# Patient Record
Sex: FEMALE | Race: WHITE | NOT HISPANIC OR LATINO | Employment: OTHER | URBAN - METROPOLITAN AREA
[De-identification: names, ages, dates, MRNs, and addresses within clinical notes are randomized per-mention and may not be internally consistent; named-entity substitution may affect disease eponyms.]

---

## 2017-05-12 DIAGNOSIS — E78.5 HYPERLIPIDEMIA: ICD-10-CM

## 2017-05-12 DIAGNOSIS — I10 ESSENTIAL (PRIMARY) HYPERTENSION: ICD-10-CM

## 2017-05-12 DIAGNOSIS — E11.9 TYPE 2 DIABETES MELLITUS WITHOUT COMPLICATIONS (HCC): ICD-10-CM

## 2017-05-12 DIAGNOSIS — K76.0 FATTY (CHANGE OF) LIVER, NOT ELSEWHERE CLASSIFIED: ICD-10-CM

## 2017-06-05 ENCOUNTER — APPOINTMENT (OUTPATIENT)
Dept: LAB | Facility: CLINIC | Age: 72
End: 2017-06-05
Payer: MEDICARE

## 2017-06-05 DIAGNOSIS — E11.9 TYPE 2 DIABETES MELLITUS WITHOUT COMPLICATIONS (HCC): ICD-10-CM

## 2017-06-05 DIAGNOSIS — E78.5 HYPERLIPIDEMIA: ICD-10-CM

## 2017-06-05 DIAGNOSIS — I10 ESSENTIAL (PRIMARY) HYPERTENSION: ICD-10-CM

## 2017-06-05 DIAGNOSIS — K76.0 FATTY (CHANGE OF) LIVER, NOT ELSEWHERE CLASSIFIED: ICD-10-CM

## 2017-06-05 LAB
ALBUMIN SERPL BCP-MCNC: 4.2 G/DL (ref 3.5–5)
ALP SERPL-CCNC: 64 U/L (ref 46–116)
ALT SERPL W P-5'-P-CCNC: 19 U/L (ref 12–78)
ANION GAP SERPL CALCULATED.3IONS-SCNC: 7 MMOL/L (ref 4–13)
AST SERPL W P-5'-P-CCNC: 17 U/L (ref 5–45)
BASOPHILS # BLD AUTO: 0.03 THOUSANDS/ΜL (ref 0–0.1)
BASOPHILS NFR BLD AUTO: 0 % (ref 0–1)
BILIRUB SERPL-MCNC: 0.5 MG/DL (ref 0.2–1)
BUN SERPL-MCNC: 11 MG/DL (ref 5–25)
CALCIUM SERPL-MCNC: 9.5 MG/DL (ref 8.3–10.1)
CHLORIDE SERPL-SCNC: 105 MMOL/L (ref 100–108)
CHOLEST SERPL-MCNC: 260 MG/DL (ref 50–200)
CO2 SERPL-SCNC: 29 MMOL/L (ref 21–32)
CREAT SERPL-MCNC: 0.59 MG/DL (ref 0.6–1.3)
EOSINOPHIL # BLD AUTO: 0.39 THOUSAND/ΜL (ref 0–0.61)
EOSINOPHIL NFR BLD AUTO: 6 % (ref 0–6)
ERYTHROCYTE [DISTWIDTH] IN BLOOD BY AUTOMATED COUNT: 13.1 % (ref 11.6–15.1)
EST. AVERAGE GLUCOSE BLD GHB EST-MCNC: 128 MG/DL
GFR SERPL CREATININE-BSD FRML MDRD: >60 ML/MIN/1.73SQ M
GLUCOSE P FAST SERPL-MCNC: 107 MG/DL (ref 65–99)
HBA1C MFR BLD: 6.1 % (ref 4.2–6.3)
HCT VFR BLD AUTO: 42.2 % (ref 34.8–46.1)
HDLC SERPL-MCNC: 75 MG/DL (ref 40–60)
HGB BLD-MCNC: 14.4 G/DL (ref 11.5–15.4)
LDLC SERPL CALC-MCNC: 168 MG/DL (ref 0–100)
LYMPHOCYTES # BLD AUTO: 2.63 THOUSANDS/ΜL (ref 0.6–4.47)
LYMPHOCYTES NFR BLD AUTO: 38 % (ref 14–44)
MCH RBC QN AUTO: 30.5 PG (ref 26.8–34.3)
MCHC RBC AUTO-ENTMCNC: 34.1 G/DL (ref 31.4–37.4)
MCV RBC AUTO: 89 FL (ref 82–98)
MONOCYTES # BLD AUTO: 0.56 THOUSAND/ΜL (ref 0.17–1.22)
MONOCYTES NFR BLD AUTO: 8 % (ref 4–12)
NEUTROPHILS # BLD AUTO: 3.25 THOUSANDS/ΜL (ref 1.85–7.62)
NEUTS SEG NFR BLD AUTO: 48 % (ref 43–75)
NRBC BLD AUTO-RTO: 0 /100 WBCS
PLATELET # BLD AUTO: 208 THOUSANDS/UL (ref 149–390)
PMV BLD AUTO: 10.7 FL (ref 8.9–12.7)
POTASSIUM SERPL-SCNC: 4.5 MMOL/L (ref 3.5–5.3)
PROT SERPL-MCNC: 7.8 G/DL (ref 6.4–8.2)
RBC # BLD AUTO: 4.72 MILLION/UL (ref 3.81–5.12)
SODIUM SERPL-SCNC: 141 MMOL/L (ref 136–145)
TRIGL SERPL-MCNC: 87 MG/DL
TSH SERPL DL<=0.05 MIU/L-ACNC: 4.38 UIU/ML (ref 0.36–3.74)
WBC # BLD AUTO: 6.87 THOUSAND/UL (ref 4.31–10.16)

## 2017-06-05 PROCEDURE — 84443 ASSAY THYROID STIM HORMONE: CPT

## 2017-06-05 PROCEDURE — 80061 LIPID PANEL: CPT

## 2017-06-05 PROCEDURE — 85025 COMPLETE CBC W/AUTO DIFF WBC: CPT

## 2017-06-05 PROCEDURE — 36415 COLL VENOUS BLD VENIPUNCTURE: CPT

## 2017-06-05 PROCEDURE — 80053 COMPREHEN METABOLIC PANEL: CPT

## 2017-06-05 PROCEDURE — 83036 HEMOGLOBIN GLYCOSYLATED A1C: CPT

## 2017-06-13 ENCOUNTER — ALLSCRIPTS OFFICE VISIT (OUTPATIENT)
Dept: OTHER | Facility: OTHER | Age: 72
End: 2017-06-13

## 2017-06-13 DIAGNOSIS — I10 ESSENTIAL (PRIMARY) HYPERTENSION: ICD-10-CM

## 2017-06-13 DIAGNOSIS — R79.89 OTHER SPECIFIED ABNORMAL FINDINGS OF BLOOD CHEMISTRY: ICD-10-CM

## 2017-06-13 DIAGNOSIS — E78.5 HYPERLIPIDEMIA: ICD-10-CM

## 2017-06-13 DIAGNOSIS — E11.9 TYPE 2 DIABETES MELLITUS WITHOUT COMPLICATIONS (HCC): ICD-10-CM

## 2017-06-13 DIAGNOSIS — R73.09 OTHER ABNORMAL GLUCOSE: ICD-10-CM

## 2017-08-30 DIAGNOSIS — E11.9 TYPE 2 DIABETES MELLITUS WITHOUT COMPLICATIONS (HCC): ICD-10-CM

## 2017-08-30 DIAGNOSIS — E55.9 VITAMIN D DEFICIENCY: ICD-10-CM

## 2017-08-30 DIAGNOSIS — K52.9 NONINFECTIVE GASTROENTERITIS AND COLITIS: ICD-10-CM

## 2017-08-30 DIAGNOSIS — I10 ESSENTIAL (PRIMARY) HYPERTENSION: ICD-10-CM

## 2017-08-30 DIAGNOSIS — R94.6 ABNORMAL RESULTS OF THYROID FUNCTION STUDIES: ICD-10-CM

## 2017-08-30 DIAGNOSIS — M81.0 AGE-RELATED OSTEOPOROSIS WITHOUT CURRENT PATHOLOGICAL FRACTURE: ICD-10-CM

## 2017-08-30 DIAGNOSIS — K76.0 FATTY (CHANGE OF) LIVER, NOT ELSEWHERE CLASSIFIED: ICD-10-CM

## 2017-08-30 DIAGNOSIS — R73.09 OTHER ABNORMAL GLUCOSE: ICD-10-CM

## 2017-09-06 ENCOUNTER — APPOINTMENT (OUTPATIENT)
Dept: LAB | Facility: CLINIC | Age: 72
End: 2017-09-06
Payer: MEDICARE

## 2017-09-06 DIAGNOSIS — I10 ESSENTIAL (PRIMARY) HYPERTENSION: ICD-10-CM

## 2017-09-06 DIAGNOSIS — E11.9 TYPE 2 DIABETES MELLITUS WITHOUT COMPLICATIONS (HCC): ICD-10-CM

## 2017-09-06 DIAGNOSIS — R73.09 OTHER ABNORMAL GLUCOSE: ICD-10-CM

## 2017-09-06 DIAGNOSIS — K76.0 FATTY (CHANGE OF) LIVER, NOT ELSEWHERE CLASSIFIED: ICD-10-CM

## 2017-09-06 DIAGNOSIS — R94.6 ABNORMAL RESULTS OF THYROID FUNCTION STUDIES: ICD-10-CM

## 2017-09-06 DIAGNOSIS — M81.0 AGE-RELATED OSTEOPOROSIS WITHOUT CURRENT PATHOLOGICAL FRACTURE: ICD-10-CM

## 2017-09-06 LAB
25(OH)D3 SERPL-MCNC: 8 NG/ML (ref 30–100)
ALBUMIN SERPL BCP-MCNC: 4 G/DL (ref 3.5–5)
ALP SERPL-CCNC: 68 U/L (ref 46–116)
ALT SERPL W P-5'-P-CCNC: 17 U/L (ref 12–78)
ANION GAP SERPL CALCULATED.3IONS-SCNC: 7 MMOL/L (ref 4–13)
AST SERPL W P-5'-P-CCNC: 19 U/L (ref 5–45)
BASOPHILS # BLD AUTO: 0.04 THOUSANDS/ΜL (ref 0–0.1)
BASOPHILS NFR BLD AUTO: 1 % (ref 0–1)
BILIRUB SERPL-MCNC: 0.63 MG/DL (ref 0.2–1)
BUN SERPL-MCNC: 12 MG/DL (ref 5–25)
CALCIUM SERPL-MCNC: 9.7 MG/DL (ref 8.3–10.1)
CHLORIDE SERPL-SCNC: 101 MMOL/L (ref 100–108)
CHOLEST SERPL-MCNC: 242 MG/DL (ref 50–200)
CO2 SERPL-SCNC: 30 MMOL/L (ref 21–32)
CREAT SERPL-MCNC: 0.67 MG/DL (ref 0.6–1.3)
CREAT UR-MCNC: 17.4 MG/DL
EOSINOPHIL # BLD AUTO: 0.3 THOUSAND/ΜL (ref 0–0.61)
EOSINOPHIL NFR BLD AUTO: 5 % (ref 0–6)
ERYTHROCYTE [DISTWIDTH] IN BLOOD BY AUTOMATED COUNT: 13.2 % (ref 11.6–15.1)
EST. AVERAGE GLUCOSE BLD GHB EST-MCNC: 128 MG/DL
GFR SERPL CREATININE-BSD FRML MDRD: 89 ML/MIN/1.73SQ M
GLUCOSE P FAST SERPL-MCNC: 116 MG/DL (ref 65–99)
HBA1C MFR BLD: 6.1 % (ref 4.2–6.3)
HCT VFR BLD AUTO: 41.3 % (ref 34.8–46.1)
HDLC SERPL-MCNC: 68 MG/DL (ref 40–60)
HGB BLD-MCNC: 14.1 G/DL (ref 11.5–15.4)
LDLC SERPL CALC-MCNC: 155 MG/DL (ref 0–100)
LYMPHOCYTES # BLD AUTO: 2.75 THOUSANDS/ΜL (ref 0.6–4.47)
LYMPHOCYTES NFR BLD AUTO: 42 % (ref 14–44)
MCH RBC QN AUTO: 30.2 PG (ref 26.8–34.3)
MCHC RBC AUTO-ENTMCNC: 34.1 G/DL (ref 31.4–37.4)
MCV RBC AUTO: 88 FL (ref 82–98)
MICROALBUMIN UR-MCNC: <5 MG/L (ref 0–20)
MICROALBUMIN/CREAT 24H UR: <29 MG/G CREATININE (ref 0–30)
MONOCYTES # BLD AUTO: 0.58 THOUSAND/ΜL (ref 0.17–1.22)
MONOCYTES NFR BLD AUTO: 9 % (ref 4–12)
NEUTROPHILS # BLD AUTO: 2.89 THOUSANDS/ΜL (ref 1.85–7.62)
NEUTS SEG NFR BLD AUTO: 43 % (ref 43–75)
NRBC BLD AUTO-RTO: 0 /100 WBCS
PLATELET # BLD AUTO: 222 THOUSANDS/UL (ref 149–390)
PMV BLD AUTO: 10.7 FL (ref 8.9–12.7)
POTASSIUM SERPL-SCNC: 4.5 MMOL/L (ref 3.5–5.3)
PROT SERPL-MCNC: 7.9 G/DL (ref 6.4–8.2)
RBC # BLD AUTO: 4.67 MILLION/UL (ref 3.81–5.12)
SODIUM SERPL-SCNC: 138 MMOL/L (ref 136–145)
TRIGL SERPL-MCNC: 95 MG/DL
TSH SERPL DL<=0.05 MIU/L-ACNC: 5.32 UIU/ML (ref 0.36–3.74)
WBC # BLD AUTO: 6.57 THOUSAND/UL (ref 4.31–10.16)

## 2017-09-06 PROCEDURE — 82306 VITAMIN D 25 HYDROXY: CPT

## 2017-09-06 PROCEDURE — 82570 ASSAY OF URINE CREATININE: CPT

## 2017-09-06 PROCEDURE — 85025 COMPLETE CBC W/AUTO DIFF WBC: CPT

## 2017-09-06 PROCEDURE — 80061 LIPID PANEL: CPT

## 2017-09-06 PROCEDURE — 80053 COMPREHEN METABOLIC PANEL: CPT

## 2017-09-06 PROCEDURE — 82043 UR ALBUMIN QUANTITATIVE: CPT

## 2017-09-06 PROCEDURE — 83036 HEMOGLOBIN GLYCOSYLATED A1C: CPT

## 2017-09-06 PROCEDURE — 84443 ASSAY THYROID STIM HORMONE: CPT

## 2017-09-06 PROCEDURE — 36415 COLL VENOUS BLD VENIPUNCTURE: CPT

## 2017-09-11 ENCOUNTER — ALLSCRIPTS OFFICE VISIT (OUTPATIENT)
Dept: OTHER | Facility: OTHER | Age: 72
End: 2017-09-11

## 2017-09-21 ENCOUNTER — APPOINTMENT (OUTPATIENT)
Dept: LAB | Facility: CLINIC | Age: 72
End: 2017-09-21
Payer: MEDICARE

## 2017-09-21 ENCOUNTER — GENERIC CONVERSION - ENCOUNTER (OUTPATIENT)
Dept: OTHER | Facility: OTHER | Age: 72
End: 2017-09-21

## 2017-09-21 DIAGNOSIS — E55.9 VITAMIN D DEFICIENCY: ICD-10-CM

## 2017-09-21 DIAGNOSIS — K52.9 NONINFECTIVE GASTROENTERITIS AND COLITIS: ICD-10-CM

## 2017-09-21 LAB
25(OH)D3 SERPL-MCNC: 15.9 NG/ML (ref 30–100)
MAGNESIUM SERPL-MCNC: 2.3 MG/DL (ref 1.6–2.6)

## 2017-09-21 PROCEDURE — 36415 COLL VENOUS BLD VENIPUNCTURE: CPT

## 2017-09-21 PROCEDURE — 82306 VITAMIN D 25 HYDROXY: CPT

## 2017-09-21 PROCEDURE — 83735 ASSAY OF MAGNESIUM: CPT

## 2017-09-27 DIAGNOSIS — R79.89 OTHER SPECIFIED ABNORMAL FINDINGS OF BLOOD CHEMISTRY: ICD-10-CM

## 2017-11-27 ENCOUNTER — GENERIC CONVERSION - ENCOUNTER (OUTPATIENT)
Dept: OTHER | Facility: OTHER | Age: 72
End: 2017-11-27

## 2017-11-27 LAB
LEFT EYE DIABETIC RETINOPATHY: NORMAL
RIGHT EYE DIABETIC RETINOPATHY: NORMAL

## 2017-12-11 DIAGNOSIS — E55.9 VITAMIN D DEFICIENCY: ICD-10-CM

## 2018-01-11 ENCOUNTER — APPOINTMENT (OUTPATIENT)
Dept: LAB | Facility: CLINIC | Age: 73
End: 2018-01-11
Payer: MEDICARE

## 2018-01-11 ENCOUNTER — GENERIC CONVERSION - ENCOUNTER (OUTPATIENT)
Dept: OTHER | Facility: OTHER | Age: 73
End: 2018-01-11

## 2018-01-11 DIAGNOSIS — E55.9 VITAMIN D DEFICIENCY: ICD-10-CM

## 2018-01-11 DIAGNOSIS — E78.5 HYPERLIPIDEMIA: ICD-10-CM

## 2018-01-11 DIAGNOSIS — I10 ESSENTIAL (PRIMARY) HYPERTENSION: ICD-10-CM

## 2018-01-11 DIAGNOSIS — E11.9 TYPE 2 DIABETES MELLITUS WITHOUT COMPLICATIONS (HCC): ICD-10-CM

## 2018-01-11 DIAGNOSIS — R94.6 ABNORMAL RESULTS OF THYROID FUNCTION STUDIES: ICD-10-CM

## 2018-01-11 LAB
25(OH)D3 SERPL-MCNC: 49.6 NG/ML (ref 30–100)
ALBUMIN SERPL BCP-MCNC: 4.3 G/DL (ref 3.5–5)
ALP SERPL-CCNC: 64 U/L (ref 46–116)
ALT SERPL W P-5'-P-CCNC: 24 U/L (ref 12–78)
ANION GAP SERPL CALCULATED.3IONS-SCNC: 7 MMOL/L (ref 4–13)
AST SERPL W P-5'-P-CCNC: 19 U/L (ref 5–45)
BILIRUB SERPL-MCNC: 0.54 MG/DL (ref 0.2–1)
BUN SERPL-MCNC: 15 MG/DL (ref 5–25)
CALCIUM SERPL-MCNC: 9.7 MG/DL (ref 8.3–10.1)
CHLORIDE SERPL-SCNC: 102 MMOL/L (ref 100–108)
CHOLEST SERPL-MCNC: 243 MG/DL (ref 50–200)
CO2 SERPL-SCNC: 30 MMOL/L (ref 21–32)
CREAT SERPL-MCNC: 0.59 MG/DL (ref 0.6–1.3)
EST. AVERAGE GLUCOSE BLD GHB EST-MCNC: 126 MG/DL
GFR SERPL CREATININE-BSD FRML MDRD: 92 ML/MIN/1.73SQ M
GLUCOSE P FAST SERPL-MCNC: 114 MG/DL (ref 65–99)
HBA1C MFR BLD: 6 % (ref 4.2–6.3)
HDLC SERPL-MCNC: 73 MG/DL (ref 40–60)
LDLC SERPL CALC-MCNC: 153 MG/DL (ref 0–100)
POTASSIUM SERPL-SCNC: 4 MMOL/L (ref 3.5–5.3)
PROT SERPL-MCNC: 7.9 G/DL (ref 6.4–8.2)
SODIUM SERPL-SCNC: 139 MMOL/L (ref 136–145)
T4 FREE SERPL-MCNC: 0.8 NG/DL (ref 0.76–1.46)
TRIGL SERPL-MCNC: 84 MG/DL
TSH SERPL DL<=0.05 MIU/L-ACNC: 4.24 UIU/ML (ref 0.36–3.74)

## 2018-01-11 PROCEDURE — 80061 LIPID PANEL: CPT

## 2018-01-11 PROCEDURE — 84439 ASSAY OF FREE THYROXINE: CPT

## 2018-01-11 PROCEDURE — 83036 HEMOGLOBIN GLYCOSYLATED A1C: CPT

## 2018-01-11 PROCEDURE — 36415 COLL VENOUS BLD VENIPUNCTURE: CPT

## 2018-01-11 PROCEDURE — 80053 COMPREHEN METABOLIC PANEL: CPT

## 2018-01-11 PROCEDURE — 82306 VITAMIN D 25 HYDROXY: CPT

## 2018-01-11 PROCEDURE — 84443 ASSAY THYROID STIM HORMONE: CPT

## 2018-01-12 NOTE — RESULT NOTES
Verified Results  (1) VITAMIN D 25-HYDROXY [id number] 09:56AM Xavi CONRAD Order Number: [id number]     Test Name Result Flag Reference   VIT D 25-HYDROX 15 9 ng/mL L 30 0-100 0   This assay is a certified procedure of the CDC Vitamin D Standardization Certification Program (VDSCP)     Deficiency <20ng/ml   Insufficiency 20-30ng/ml   Sufficient  ng/ml     *Patients undergoing fluorescein dye angiography may retain small amounts of fluorescein in the body for 48-72 hours post procedure  Samples containing fluorescein can produce falsely elevated Vitamin D values  If the patient had this procedure, a specimen should be resubmitted post fluorescein clearance       (1) MAGNESIUM [id number] 09:56AM Shira Mosley Order Number: [id number]     Test Name Result Flag Reference   MAGNESIUM 2 3 mg/dL  1 6-2 6

## 2018-01-13 VITALS
WEIGHT: 138.2 LBS | BODY MASS INDEX: 23.03 KG/M2 | TEMPERATURE: 96.6 F | DIASTOLIC BLOOD PRESSURE: 64 MMHG | SYSTOLIC BLOOD PRESSURE: 120 MMHG | HEART RATE: 82 BPM | RESPIRATION RATE: 16 BRPM | HEIGHT: 65 IN

## 2018-01-14 NOTE — RESULT NOTES
Verified Results  (1) URINE CULTURE [id number] 08:39AM Megan Napoles    Order Number: [id number]     Test Name Result Flag Reference   CLINICAL REPORT (Report)     Test:        Urine culture  Specimen Type:   Urine  Specimen Date:   10/18/2016 8:39 AM  Result Date:    10/19/2016 10:06 AM  Result Status:   Final result  Resulting Lab:   [place] [address]            Tel: [phone]      CULTURE                                       ------------------                                   <10,000 cfu/ml Gram Negative Robert

## 2018-01-16 NOTE — RESULT NOTES
Verified Results  (1) COMPREHENSIVE METABOLIC PANEL [id number] 87:75MG Arian Valenzuela   TW Order Number: [id number]     Test Name Result Flag Reference   GLUCOSE,RANDM 104 mg/dL     If the patient is fasting, the ADA then defines impaired fasting glucose as > 100 mg/dL and diabetes as > or equal to 123 mg/dL  SODIUM 135 mmol/L L 136-145   POTASSIUM 3 8 mmol/L  3 5-5 3   CHLORIDE 101 mmol/L  100-108   CARBON DIOXIDE 27 mmol/L  21-32   ANION GAP (CALC) 7 mmol/L  4-13   BLOOD UREA NITROGEN 9 mg/dL  5-25   CREATININE 0 64 mg/dL  0 60-1 30   Standardized to IDMS reference method   CALCIUM 9 1 mg/dL  8 3-10 1   BILI, TOTAL 0 79 mg/dL  0 20-1 00   ALK PHOSPHATAS 51 U/L     ALT (SGPT) 21 U/L  12-78   AST(SGOT) 16 U/L  5-45   ALBUMIN 4 1 g/dL  3 5-5 0   TOTAL PROTEIN 7 3 g/dL  6 4-8 2   eGFR Non-African American      >60 0 ml/min/1 73sq m   [facility] Heart Disease Education Program recommendations are as follows:  GFR calculation is accurate only with a steady state creatinine  Chronic Kidney disease less than 60 ml/min/1 73 sq  meters  Kidney failure less than 15 ml/min/1 73 sq  meters  (1) HEMOGLOBIN A1C 18Oct2016 08:39AM Mary Mendoza Order Number: [id number]     Test Name Result Flag Reference   HEMOGLOBIN A1C 5 6 %  4 2-6 3   EST  AVG  GLUCOSE 114 mg/dl         Plan  DMII (diabetes mellitus, type 2), Hyperlipidemia    · (1) HEMOGLOBIN A1C; Status:Active; Requested OWC:[id number];   Hyperlipidemia    · (1) COMPREHENSIVE METABOLIC PANEL; Status:Active; Requested [id number];    · (1) LIPID PANEL, FASTING; Status:Active; Requested for:18Jan2017;    · (1) MICROALBUMIN CREATININE RATIO, RANDOM URINE; Status:Active;  Requested  NEW:[id number];

## 2018-01-17 ENCOUNTER — ALLSCRIPTS OFFICE VISIT (OUTPATIENT)
Dept: OTHER | Facility: OTHER | Age: 73
End: 2018-01-17

## 2018-01-17 NOTE — PROGRESS NOTES
Assessment    1  Medicare annual wellness visit, subsequent (V70 0) (Z00 00)   2  DMII (diabetes mellitus, type 2) (250 00) (E11 9)   3  Abnormal TSH (790 6) (R94 6)   4  Hyperlipidemia (272 4) (E78 5)   5  Vitamin D deficiency (268 9) (E55 9)    Plan  Abnormal TSH, Vitamin D deficiency    · (1) TSH WITH FT4 REFLEX; Status:Active; Requested [id number];   DMII (diabetes mellitus, type 2)    · FreeStyle Lancets Miscellaneous; TEST ONCE DAILY  DMII (diabetes mellitus, type 2), Vitamin D deficiency    · (1) HEMOGLOBIN A1C; Status:Active; Requested PDF:[id number];   Hyperlipidemia, Hypertension, Vitamin D deficiency    · (1) LIPID PANEL, FASTING; Status:Active; Requested ZVY:[id number]; Uncontrolled diabetes mellitus with ophthalmic complications    · FreeStyle Lite Test In Vitro Strip; TEST ONCE DAILY  Vitamin D deficiency    · Vitamin D3 15472 UNIT Oral Capsule; 1 TAB WEEKLY FOR 12 WEEKS(CAN TAKE  1/2 TAB 2 X A WEEK )   · (1) COMPREHENSIVE METABOLIC PANEL; Status:Active; Requested BYL:[id number];    · (1) VITAMIN D 25-HYDROXY; Status:Active; Requested for:[id number];     Discussion/Summary    FOLLOW BLOOD PRESSURE  ASA - 1-2 X PER WEEK PER PT PREFERENCE  DM2- STABLE - ON NO MEDS    FULL LABS IN 4MONTHS- FOLLOW TSH -   VIT D- ADD 50,000 IU WEEKLY AND VIT D 5000 IU DAILY- REPEAT IN 4 MONTHS   DOES NOT WANT FIT OR COLONO  DOES NOT WANT FLU SHOT DUE TO "SMALLPOX REACTION"   TRIGGER FINGER RIGHT HAND- CONSIDER PT OR ORTHO EVAL  ALLERGIC RHINITIS - USING OTC MEDS;   MIGRAINE- STAQBLE  SCALP IRRITATION- USING T GEL AND PSORIASIS CREAM OTC- BLEEDING LESION ON HER SCALP; call if no better - will presscribe steroid SHAMPOO  Impression: Subsequent Annual Wellness Visit, with preventive exam as well as age and risk appropriate counseling completed       Cardiovascular screening and counseling: counseling was given on ways to improve cholesterol, counseling was given on ways to improve exercise tolerance and Dx - V81 2 Screen for CV Disorder  Diabetes screening and counseling: counseling was given on maintaining a healthy diet, counseling was given on maintaining a healthy weight, counseling was given on ways to improve physical activity and Dx - V77 1 Screen for DM  Colorectal cancer screening and counseling: the patient declines screening, PT DOES NOTW ATN COLONO OR FIT TEST and Dx - V76 51 Screen for CRC  Cervical cancer screening and counseling: screening not indicated  Abdominal aortic aneurysm screening and counseling: screening is current and Dx - V81 2 Screen for CV Disorder  Glaucoma screening and counseling: screening is current and Dx - V80 1 Screen for Glaucoma  HIV screening and counseling: the patient declines screening  Hepatitis C Screening:  The patient declines Hepatitis C screening  Advance Directive Planning: complete and up to date, she was encouraged to follow-up with me to discuss her questions and/or decisions  Patient Discussion: plan discussed with the patient, follow-up visit needed in one year  The patient was counseled regarding diagnostic results, instructions for management, risk factor reductions, prognosis, patient and family education, impressions, risks and benefits of treatment options, importance of compliance with treatment  Chief Complaint  PATIENT HERE FOR FOLLOW UP  SHE IS TAKING HER BS 2X A DAY AND SHE HAS BS -125; NO HYPOGLYCEMIA  SHE HAS A RASH ON HER HAND AND SOME IRRITATION OF JOINT IN HANDS   SHE HAS ALLERGY SYTMOMS - ADD CLARITIN AND ANTI HIS EYE DROPS   Patient is here today for follow up of chronic conditions described in HPI  History of Present Illness  HPI: SHE HAS TRIGGER FINGER RIGHT 4TH DIGIT; PAIN LEFT PIP JOINT INDEX FINGER;   SHE HAS IRRITATED AREAS ON HER SCALP   Welcome to Medicare and Wellness Visits: The patient is being seen for the subsequent annual wellness visit     Medicare Screening and Risk Factors   Hospitalizations: she has been hospitalized times  Medicare Screening Tests Risk Questions   Abdominal aortic aneurysm risk assessment: none indicated  Osteoporosis risk assessment:  and female gender  HIV risk assessment: none indicated  Drug and Alcohol Use: The patient has never smoked cigarettes  The patient reports never drinking alcohol  Diet and Physical Activity: Current diet includes well balanced meals  She exercises daily  Exercise: walking  Mood Disorder and Cognitive Impairment Screening:   Depression screening  negative for symptoms  She denies feeling down, depressed, or hopeless over the past two weeks  She denies feeling little interest or pleasure in doing things over the past two weeks  Cognitive impairment screening: denies difficulty learning/retaining new information, denies difficulty handling complex tasks, denies difficulty with reasoning, denies difficulty with spatial ability and orientation, denies difficulty with language and denies difficulty with behavior  Functional Ability/Level of Safety: Hearing is normal bilaterally  The patient is currently able to do activities of daily living without limitations, able to do instrumental activities of daily living without limitations, able to participate in social activities without limitations and able to drive without limitations  Activities of daily living details: does not need help using the phone, no transportation help needed, does not need help shopping, no meal preparation help needed, does not need help doing housework, does not need help doing laundry and does not need help managing medications  Fall risk factors: The patient fell 0 times in the past 12 months  Home safety risk factors:  no unfamiliar surroundings, no uneven floors and handrails on the stairs  Advance Directives: Advance directives: living will, durable power of  for health care directives and advance directives  I agree with the patient's decisions  Co-Managers and Medical Equipment/Suppliers: See Patient Care Team   Reviewed Updated [facility]:   Last Medicare Wellness Visit Information was reviewed, patient interviewed, no change since last AWV  Preventive Quality Program 65 and Older: Falls Risk: The patient fell 0 times in the past 12 months  The patient is currently asymptomatic Symptoms Include: The patient currently has no urinary incontinence symptoms  Date of last glaucoma screen was 2017   Vitamin D Deficiency Screening Pathway: The patient is being seen for follow-up of and VIT D LEVEL 8 vitamin D deficiency  Disease type: vitamin D deficiency  Hyperlipidemia (Follow-Up): The patient states her hyperlipidemia has been under good control since the last visit  Comorbid Illnesses: diabetes mellitus  She has no significant interval events  Symptoms: The patient is currently asymptomatic  Diabetes Type II (Follow-Up): The patient states she has been doing poorly with her Type II Diabetes control since the last visit  Comorbid Illnesses: hypertension, hyperlipidemia and obesity  Complications: peripheral neuropathy and nephropathy, but no peripheral vascular disease, no gastroparesis, no retinopathy, no coronary artery disease and no sexual dysfunction  She has no known diabetic complications  She has no significant interval events  Symptoms: The patient is currently asymptomatic  denies numbness of the feet, denies a foot ulcer, denies foot pain, denies vomiting and worsened visual impairment  Home monitoring: The patient is not checking blood sugars at home  Glycemic control has been poor  the patient reports no symptomatic hypoglycemic episodes  Joint Pain: Celsa Bae presents with complaints of joint pain     Associated symptoms include joint swelling and rash, but no migratory joint pain, no joint redness, no joint stiffness, no joint warmth, no joint deformity, no decreased range of motion, no fatigue, no myalgia, no fever, no chills, no cough, no shortness of breath and no paresthesias  Seasonal Allergy (Brief): The patient is being seen for worsening symptoms of seasonal allergy  Symptoms:  itching eyes, watery eyes, nasal congestion and postnasal drainage, but no itching nose, no runny nose, no itching throat, no cough, no sneezing and no wheezing  The patient is currently experiencing symptoms  Associated symptoms:  no hives, no head congestion, no dyspnea, no diarrhea, no fatigue, no irritability, no sleeplessness, no impaired school performance, no impaired work performance and no decreased quality of life  No associated symptoms are reported  Hypertension (Follow-Up): The patient presents for follow-up of essential hypertension  The patient states she has been doing well with her blood pressure control since the last visit  She has no comorbid illnesses  She has no significant interval events  Symptoms: The patient is currently asymptomatic  Review of Systems    Constitutional: negative, no fever and no chills  Head and Face: SEBORRHEA NOTED ON FACE AND SCALP, but negative and no facial pain  Eyes: negative, no watery discharge from the eyes and no purulent discharge from the eyes  ENT: negative, no earache, no hearing loss, no nasal congestion and no nasal discharge  Cardiovascular: negative, no chest pain, no palpitations, the heart is not racing and no lightheadedness  Respiratory: negative, no shortness of breath, no wheezing, no cough, no dry cough, no productive cough, no clear sputum and no colored sputum  Gastrointestinal: negative, no abdominal pain, no abdominal bloating, no nausea and no vomiting  Genitourinary: negative, no dysuria and no urinary frequency  Musculoskeletal: negative, no diffuse joint pain, no generalized muscle aches, no joint swelling and no joint stiffness     Integumentary and Breasts: a rash and itching, but negative, no skin lesions, no skin wound, no painful skin area with a rash or sore and no mouth sores  Neurological: negative, no headache and no confusion  Psychiatric: negative, no anxiety and no depression  Endocrine: negative, no hot flashes and no night sweats  Hematologic and Lymphatic: negative, no swollen glands and no swollen glands in the neck  Over the past 2 weeks, how often have you been bothered by the following problems? 1 ) Little interest or pleasure in doing things? Not at all    2 ) Feeling down, depressed or hopeless? Not at all    3 ) Trouble falling asleep or sleeping too much? Not at all    4 ) Feeling tired or having little energy? Not at all    5 ) Poor appetite or overeating? Not at all    6 ) Feeling bad about yourself, or that you are a failure, or have let yourself or your family down? Not at all    7 ) Trouble concentrating on things, such as reading a newspaper or watching television? Not at all    8 ) Moving or speaking so slowly that other people could have noticed, or the opposite, moving or speaking faster than usual? Not at all  How difficult have these problems made it for you to do your work, take care of things at home, or get along with people? Not at all  Score      Eyes: as noted in HPI, no eye pain, no eyesight problems and eyes not red  ENT: no complaints of earache, no loss of hearing, no nose bleeds, no nasal discharge, no sore throat, no hoarseness, no earache, no hearing loss and no nasal discharge  Cardiovascular: No complaints of slow heart rate, no fast heart rate, no chest pain, no palpitations, no leg claudication, no lower extremity edema, as noted in HPI, the heart rate was not slow, no chest pain, no intermittent leg claudication, the heart rate was not fast, no palpitations and no lower extremity edema     Respiratory: No complaints of shortness of breath, no wheezing, no cough, no SOB on exertion, no orthopnea, no PND, no shortness of breath, no cough, no wheezing and no shortness of breath during exertion  Gastrointestinal: No complaints of abdominal pain, no constipation, no nausea or vomiting, no diarrhea, no bloody stools, no abdominal pain, no nausea, no constipation and no diarrhea  Genitourinary: No complaints of dysuria, no incontinence, no pelvic pain, no dysmenorrhea, no vaginal discharge or bleeding and no dysuria  Musculoskeletal: arthralgias, myalgias, joint stiffness and RIGHT HAND PAIN, but no limb pain and no limb swelling  Integumentary: No complaints of skin rash or lesions, no itching, no skin wounds, no breast pain or lump, no itching and no skin wound  Neurological: No complaints of headache, no confusion, no convulsions, no numbness, no dizziness or fainting, no tingling, no limb weakness, no difficulty walking, no headache, no numbness, no tingling, no confusion, no dizziness, no limb weakness, no convulsions, no fainting and no difficulty walking  Psychiatric: Not suicidal, no sleep disturbance, no anxiety or depression, no change in personality, no emotional problems and no sleep disturbances  Endocrine: No complaints of proptosis, no hot flashes, no muscle weakness, no deepening of the voice, no feelings of weakness, no muscle weakness, no hot flashes and no deepening of the voice  Hematologic/Lymphatic: No complaints of swollen glands, no swollen glands in the neck, does not bleed easily, does not bruise easily, no swollen glands and no swollen glands in the neck  ROS reviewed  Active Problems    1  Abnormal glucose measurement (790 29) (R73 09)   2  Abnormal TSH (790 6) (R94 6)   3  Actinic keratosis (702 0) (L57 0)   4  Allergic rhinitis (477 9) (J30 9)   5  Alopecia areata (704 01) (L63 9)   6  Asthma (493 90) (J45 909)   7  Cholelithiasis (574 20) (K80 20)   8  Colitis (558 9) (K52 9)   9  Colon cancer screening (V76 51) (Z12 11)   10  Depression (311) (F32 9)   11  DMII (diabetes mellitus, type 2) (250 00) (E11 9)   12   Encounter for screening for osteoporosis (V82 81) (Z13 820)   13  Fatty infiltration of liver (571 8) (K76 0)   14  Hyperlipidemia (272 4) (E78 5)   15  Hypertension (401 9) (I10)   16  Lump of skin (782 2) (R22 9)   17  Medicare annual wellness visit, subsequent (V70 0) (Z00 00)   18  Migraine headache (346 90) (G43 909)   19  Need for prophylactic vaccination and inoculation against influenza (V04 81) (Z23)   20  Obsessive compulsive disorder (300 3) (F42 9)   21  Osteoporosis (733 00) (M81 0)   22  Osteoporosis screening (V82 81) (Z13 820)   23  Renal colic (173 9) (I34)   24  Seborrheic dermatitis of scalp (690 18) (L21 9)   25  Trigger finger of right thumb (727 03) (M65 311)   26  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) (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 24,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 68)    Past Medical History    The active problems and past medical history were reviewed and updated today  Surgical History    · History of Renal Lithotripsy   · History of Tonsillectomy (28 2)    The surgical history was reviewed and updated today  Family History  Mother    · Family history of amyotrophic lateral sclerosis (V17 2) (Z82 0)   · Family history of gastrointestinal bleeding (V18 59) (Z83 79)  Father    · Family history of gastrointestinal bleeding (V18 59) (Z83 79)    The family history was reviewed and updated today  The family history was reviewed and updated today  Social History    · Former smoker (V75 10) (Z93 474)  The social history was reviewed and updated today  The social history was reviewed and is unchanged  The social history was reviewed and updated today  The social history was reviewed and is unchanged  Current Meds   1  FreeStyle Lancets Miscellaneous; TEST ONCE DAILY; Therapy: 09HRE5053 to (Evaluate:10Jun2017)  Requested for: 82PKW7676; Last   Rx:15Jun2016 Ordered   2  FreeStyle Lite Test In Vitro Strip; TEST ONCE DAILY;    Therapy: 85QHR2621 to (Evaluate:12Oct2017)  Requested for: 22QMK2593; Last   Rx:17Oct2016 Ordered   3  Rogaine 2 % External Solution Recorded   4  Tylenol 325 MG Oral Tablet; Therapy: (Recorded:24Waz2552) to Recorded    The medication list was reviewed and updated today  Allergies    1  Albuterol Sulfate NEBU   2  Amoxicillin TABS   3  clindamycin   4  FLU   5  Lisinopril TABS   6  Sulfites    7  Bee sting    Immunizations   1 2    PCV  22-Jul-2016  (70y)     Td/DT  May 2003  (57y) 06-Jun-2013  (67y)     Vitals  Signs    Temperature: 96 4 F  Heart Rate: 80  Respiration: 16  Systolic: 982  Diastolic: 82  Weight: 327 lb   BMI Calculated: 23 46  BSA Calculated: 1 71    Physical Exam    Constitutional   General appearance: No acute distress, well appearing and well nourished  Eyes   Conjunctiva and lids: No swelling, erythema or discharge  Pupils and irises: Equal, round and reactive to light  Pupils: equal, round, and reactive to light bilaterally  Cornea, Lens, and Sclera: Bilateral eyes: normal    Ears, Nose, Mouth, and Throat   External inspection of ears and nose: Normal     Otoscopic examination: Tympanic membranes translucent with normal light reflex  Canals patent without erythema  Nasal mucosa, septum, and turbinates: Normal without edema or erythema  Oropharynx: Normal with no erythema, edema, exudate or lesions  Inspection of the oropharynx showed fully visible tonsils, uvula and soft palate (Mallampati class 1)  Oral mucosa was moist and SOME POST NASAL DRIP, but was normal  The palate examination showed no abnormalities  The tongue was normal  The tonsils were normal  [POST NASAL DRIP NOTED;    Pulmonary   Respiratory effort: No increased work of breathing or signs of respiratory distress  Auscultation of lungs: Clear to auscultation  Cardiovascular   Palpation of heart: Normal PMI, no thrills  Auscultation of heart: Normal rate and rhythm, normal S1 and S2, without murmurs  The heart rate was normal  The rhythm was regular   Heart sounds: an S4 was heard, but normal S1, normal S2 and no S3  no murmurs were heard  Examination of extremities for edema and/or varicosities: Normal     Carotid pulses: Normal   NO BRUITS  Abdomen   Abdomen: Non-tender, no masses  The abdomen was rounded  Bowel sounds were normal  The abdomen was soft and nontender  no masses palpated  Liver and spleen: No hepatomegaly or splenomegaly  Lymphatic   Palpation of lymph nodes in neck: No lymphadenopathy  Musculoskeletal   Gait and station: Normal     Digits and nails: Normal without clubbing or cyanosis  Inspection/palpation of joints, bones, and muscles: Abnormal     Skin   Skin and subcutaneous tissue: Normal without rashes or lesions  Neurologic   Cranial nerves: Cranial nerves 2-12 intact  Reflexes: 2+ and symmetric  Sensation: No sensory loss  Psychiatric   Orientation to person, place, and time: Normal     Mood and affect: Normal     Additional Exam:  ulcerated areas on scalp- apex of scalp 4 cm by 3 cm red and irritated; left temple 4 by 3 cm area; left tip of ear irritated- improving  Results/Data  [facility] Risk [id number] 11:52AM Matty Rubio     Test Name Result Flag Reference   [facility] - Comparative Ten Year Risk of CHD 16 9 %     [facility] - Ten Year Risk of CHD 11 88 %     Sex: Female  Ethnicity: White/  Age: 70  Total Cholesterol: 242 mg/dL  HDL Cholesterol: 68 mg/dL  Systolic Blood Pressure: 438 mmHg  Blood Pressure Medication: No  Diabetes: Yes  Smoking Status: No       Signatures   Electronically signed by :  Selene Mcdonald; Sep 11 2017 12:04PM EST                       (Author)

## 2018-01-18 NOTE — PROGRESS NOTES
Assessment   1  Hypertension (401 9) (I10)   2  DMII (diabetes mellitus, type 2) (250 00) (E11 9)   3  Seborrheic dermatitis of scalp (690 18) (L21 9)    Plan   Abnormal glucose measurement, Abnormal TSH    · (1) CBC/PLT/DIFF; Status:Active; Requested [id number]; Alopecia areata    · Ketoconazole 2 % External Shampoo; APPLY ONCE A WEEK AS DIRECTED-APPLY FOR 5 MIN    THEN RINSE  DMII (diabetes mellitus, type 2)    · FreeStyle Control Solution In Vitro Liquid; USE AS DIRECTED   · (1) HEMOGLOBIN A1C; Status:Active; Requested [id number];    · (1) LIPID PANEL, FASTING; Status:Active; Requested [id number];   DMII (diabetes mellitus, type 2), Hyperlipidemia, Hypertension    · (1) COMPREHENSIVE METABOLIC PANEL; Status:Active; Requested [id number];   DMII (diabetes mellitus, type 2), Hypertension    · (1) TSH; Status:Active; Requested [id number]; Discussion/Summary   Discussion Summary:    PNEUMOVAX TODAY; TO COMPLETE SERIES IN  CHECKING BS 2X A WEEK AND BP ONCE A WEEK OPHTHO MAMMO COONO OF KETOCONAZOLE FOR SCALP IRRITATION/SEBORRHEA  Chief Complaint   Chief Complaint Free Text Note Form: PATIENT HERE FOR FOLLOW UP;      History of Present Illness   HPI: PATIENT HERE FOR FOLLOW UP   LABS ARE EXCELLENT; HER VIT D IS NORMAL;  HAS SEEN OPHTHO  HAS TINNITUS LEFT EAR IS LEAVING FOR [facility] FOR FEB-MARCH    Hypertension (Follow-Up): The patient presents for follow-up of essential hypertension  The patient states she has been doing well with her blood pressure control since the last visit  She has no comorbid illnesses  She has no significant interval events  Symptoms: The patient is currently asymptomatic  Diabetes Type II (Follow-Up): The patient states she has been doing well with her Type II Diabetes control since the last visit  Comorbid Illnesses: hypertension,-- hyperlipidemia-- and-- obesity   Complications: peripheral neuropathy-- and-- nephropathy, but-- no peripheral vascular disease,-- no gastroparesis,-- no retinopathy,-- no coronary artery disease-- and-- no sexual dysfunction  She has no known diabetic complications  She has no significant interval events  Symptoms: The patient is currently asymptomatic  denies numbness of the feet,-- denies a foot ulcer,-- denies foot pain,-- denies vomiting-- and-- worsened visual impairment  Home monitoring: The patient is not checking blood sugars at home  -- Glycemic control has been poor  -- the patient reports no symptomatic hypoglycemic episodes  Review of Systems   Complete-Female:      Eyes: as noted in HPI,-- no eye pain,-- no eyesight problems-- and-- eyes not red  ENT: no complaints of earache, no loss of hearing, no nose bleeds, no nasal discharge, no sore throat, no hoarseness,-- no earache,-- no hearing loss-- and-- no nasal discharge  Cardiovascular: No complaints of slow heart rate, no fast heart rate, no chest pain, no palpitations, no leg claudication, no lower extremity edema,-- as noted in HPI,-- the heart rate was not slow,-- no chest pain,-- no intermittent leg claudication,-- the heart rate was not fast,-- no palpitations-- and-- no lower extremity edema  Respiratory: LEFT EAR DECREASED HEARING AND TINNITUS, but-- No complaints of shortness of breath, no wheezing, no cough, no SOB on exertion, no orthopnea, no PND,-- no shortness of breath,-- no cough,-- no wheezing-- and-- no shortness of breath during exertion  Gastrointestinal: No complaints of abdominal pain, no constipation, no nausea or vomiting, no diarrhea, no bloody stools,-- no abdominal pain,-- no nausea,-- no constipation-- and-- no diarrhea  Genitourinary: No complaints of dysuria, no incontinence, no pelvic pain, no dysmenorrhea, no vaginal discharge or bleeding-- and-- no dysuria        Musculoskeletal: No complaints of arthralgias, no myalgias, no joint swelling or stiffness, no limb pain or swelling,-- no arthralgias,-- no limb pain,-- no myalgias,-- no joint stiffness-- and-- no limb swelling  Integumentary: No complaints of skin rash or lesions, no itching, no skin wounds, no breast pain or lump,-- no rashes,-- no itching,-- no skin lesions-- and-- no skin wound  Neurological: No complaints of headache, no confusion, no convulsions, no numbness, no dizziness or fainting, no tingling, no limb weakness, no difficulty walking,-- no headache,-- no numbness,-- no tingling,-- no confusion,-- no dizziness,-- no limb weakness,-- no convulsions,-- no fainting-- and-- no difficulty walking  Psychiatric: Not suicidal, no sleep disturbance, no anxiety or depression, no change in personality, no emotional problems-- and-- no sleep disturbances  Endocrine: No complaints of proptosis, no hot flashes, no muscle weakness, no deepening of the voice, no feelings of weakness,-- no muscle weakness,-- no hot flashes-- and-- no deepening of the voice  Hematologic/Lymphatic: No complaints of swollen glands, no swollen glands in the neck, does not bleed easily, does not bruise easily,-- no swollen glands-- and-- no swollen glands in the neck  ROS Reviewed:    ROS reviewed  Active Problems   1  Abnormal glucose measurement (790 29) (R73 09)   2  Abnormal TSH (790 6) (R94 6)   3  Actinic keratosis (702 0) (L57 0)   4  Allergic rhinitis (477 9) (J30 9)   5  Alopecia areata (704 01) (L63 9)   6  Asthma (493 90) (J45 909)   7  Cholelithiasis (574 20) (K80 20)   8  Colitis (558 9) (K52 9)   9  Colon cancer screening (V76 51) (Z12 11)   10  Depression (311) (F32 9)   11  DMII (diabetes mellitus, type 2) (250 00) (E11 9)   12  Encounter for screening for osteoporosis (V82 81) (Z13 820)   13  Fatty infiltration of liver (571 8) (K76 0)   14  Hyperlipidemia (272 4) (E78 5)   15  Hypertension (401 9) (I10)   16  Lump of skin (782 2) (R22 9)   17  Medicare annual wellness visit, subsequent (V70 0) (Z00 00)   18   Migraine headache (346 90) (G43 909)   19  Obsessive compulsive disorder (300 3) (F42 9)   20  Osteoporosis (733 00) (M81 0)   21  Renal colic (181 9) (P40)   22  Seborrheic dermatitis of scalp (690 18) (L21 9)   23  Trigger finger of right thumb (727 03) (M65 311)   24  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,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)   25  Vitamin D deficiency (268 9) (E55 9)    Past Medical History   1  History of influenza vaccination (V49 89) (Z92 29)   2  History of Osteoporosis screening (V82 81) (Z13 820)  Active Problems And Past Medical History Reviewed: The active problems and past medical history were reviewed and updated today  Surgical History   1  History of Renal Lithotripsy   2  History of Tonsillectomy (28 2)  Surgical History Reviewed: The surgical history was reviewed and updated today  Family History   Mother    1  Family history of amyotrophic lateral sclerosis (V17 2) (Z82 0)   2  Family history of gastrointestinal bleeding (V18 59) (Z83 79)  Father    3  Family history of gastrointestinal bleeding (V18 59) (Z83 79)  Family History Reviewed: The family history was reviewed and updated today  Social History    · Former smoker (R52 20) (E73 641)  Social History Reviewed: The social history was reviewed and updated today  The social history was reviewed and is unchanged  Current Meds    1  FreeStyle Lancets Miscellaneous; TEST ONCE DAILY; Therapy: 96QEK9263 to (Evaluate:34Wdv3400)  Requested for: 01Pnr3298; Last Rx:49Dtz0867     Ordered   2  FreeStyle Lite Test In Vitro Strip; TEST ONCE DAILY; Therapy: 74NDM0053 to (Evaluate:71Gyy9745)  Requested for: 27Caq3561; Last Rx:16Twp3673     Ordered   3  Rogaine 2 % External Solution Recorded   4  Tylenol 325 MG Oral Tablet; Therapy: (Recorded:10Jun2016) to Recorded  Medication List Reviewed: The medication list was reviewed and updated today  Allergies   1   Albuterol Sulfate NEBU   2  Amoxicillin TABS   3  clindamycin   4  FLU   5  Lisinopril TABS   6  Sulfites  7  Bee sting    Vitals   Vital Signs    Recorded: 59ZGX8574 09:10AM   Temperature 95 5 F   Heart Rate 74   Respiration 16   Systolic 078   Diastolic 78   Height 5 ft 5 in   Weight 141 lb    BMI Calculated 23 46   BSA Calculated 1 71     Physical Exam        Constitutional      General appearance: No acute distress, well appearing and well nourished  Eyes      Conjunctiva and lids: No swelling, erythema or discharge  Pupils and irises: Equal, round and reactive to light  Pupils: equal, round, and reactive to light bilaterally  Cornea, Lens, and Sclera: Bilateral eyes: normal       Ears, Nose, Mouth, and Throat      External inspection of ears and nose: Normal        Otoscopic examination: Tympanic membranes translucent with normal light reflex  Canals patent without erythema  Nasal mucosa, septum, and turbinates: Normal without edema or erythema  Oropharynx: Normal with no erythema, edema, exudate or lesions  Inspection of the oropharynx showed fully visible tonsils, uvula and soft palate (Mallampati class 1)  Oral mucosa was moist-- and-- SOME POST NASAL DRIP, but-- was normal  The palate examination showed no abnormalities  The tongue was normal  The tonsils were normal -- [POST NASAL DRIP NOTED;       Pulmonary      Respiratory effort: No increased work of breathing or signs of respiratory distress  Auscultation of lungs: Clear to auscultation  Cardiovascular      Palpation of heart: Normal PMI, no thrills  Auscultation of heart: Normal rate and rhythm, normal S1 and S2, without murmurs  The heart rate was normal  The rhythm was regular  Heart sounds: an S4 was heard, but-- normal S1,-- normal S2-- and-- no S3  no murmurs were heard  Examination of extremities for edema and/or varicosities: Normal        Carotid pulses: Normal  -- NO BRUITS  Abdomen      Abdomen: Non-tender, no masses    The abdomen was rounded  Bowel sounds were normal  The abdomen was soft and nontender  no masses palpated  Liver and spleen: No hepatomegaly or splenomegaly  Lymphatic      Palpation of lymph nodes in neck: No lymphadenopathy  Musculoskeletal      Gait and station: Normal        Digits and nails: Normal without clubbing or cyanosis  Inspection/palpation of joints, bones, and muscles: Normal        Skin      Skin and subcutaneous tissue: Abnormal  -- SEBORRHEA ON SCALP  Neurologic      Cranial nerves: Cranial nerves 2-12 intact  Reflexes: 2+ and symmetric  Sensation: No sensory loss  Psychiatric      Orientation to person, place, and time: Normal        Mood and affect: Normal        Additional Exam:  SOME SCALING ON SCALP  Results/Data   *VB-Depression Screening 57Ehk4757 12:00AM       Test Name Result Flag Reference   Depression Scale Result      Depression Screen - Negative For Symptoms      Summary / No summary entered :        No summary entered  Documents attached :        Mikey Yeung  [facility]; Enc: [id number] - Appointment - Aida Cullen - (Internal Medicine) (Additional Information Document)    Signatures    Electronically signed by :  Lenore Francis DO; Jan 17 2018  9:50AM EST                       (Author)

## 2018-01-18 NOTE — RESULT NOTES
Verified Results  (1) COMPREHENSIVE METABOLIC PANEL 19FMZ2547 69:08FF Xavi Bruno    Order Number: [id number]  TW Order Number: [id number] Order Number: [id number] Patient Instructions: This is a fasting blood test  Water,black tea or black  coffee only after 9:00pm the night before test Drink 2 glasses of water the morning of test TW Order     Test Name Result Flag Reference   GLUCOSE,RANDM 101 mg/dL     If the patient is fasting, the ADA then defines impaired fasting glucose as > 100 mg/dL and diabetes as > or equal to 123 mg/dL  SODIUM 136 mmol/L  136-145   POTASSIUM 4 5 mmol/L  3 5-5 3   CHLORIDE 102 mmol/L  100-108   CARBON DIOXIDE 26 mmol/L  21-32   ANION GAP (CALC) 8 mmol/L  4-13   BLOOD UREA NITROGEN 10 mg/dL  5-25   CREATININE 0 71 mg/dL  0 60-1 30   Standardized to IDMS reference method   CALCIUM 9 4 mg/dL  8 3-10 1   BILI, TOTAL 1 14 mg/dL H 0 20-1 00   ALK PHOSPHATAS 56 U/L     ALT (SGPT) 33 U/L  12-78   AST(SGOT) 24 U/L  5-45   ALBUMIN 4 2 g/dL  3 5-5 0   TOTAL PROTEIN 7 7 g/dL  6 4-8 2   eGFR Non-African American      >60 0 ml/min/1 73sq m   Number: [id number] Patient Instructions: This bloodwork is non-fasting  Please drink two glasses of water morning of bloodwork  National Kidney Disease Education Program recommendations are as follows:  GFR calculation is accurate only with a steady state creatinine  Chronic Kidney disease less than 60 ml/min/1 73 sq  meters  Kidney failure less than 15 ml/min/1 73 sq  meters  (1) CBC/PLT/DIFF 09Xul6144 08:03AM Xavi Bruno    Order Number: [id number]  TW Order Number: [id number] Patient Instructions: This bloodwork is non-fasting  Please drink two glasses of water morning of bloodwork       Test Name Result Flag Reference   WBC COUNT 5 60 Thousand/uL  4 31-10 16   RBC COUNT 5 01 Million/uL  3 81-5 12   HEMOGLOBIN 15 5 g/dL H 11 5-15 4   HEMATOCRIT 44 9 % 34 8-46  1   MCV 90 fL  82-98   MCH 30 9 pg  26 8-34 3   MCHC 34 5 g/dL  31 4-37 4   RDW 13 4 %  11 6-15 1   MPV 11 0 fL  8 9-12 7   PLATELET COUNT 804 Thousands/uL  149-390   nRBC AUTOMATED 0 /100 WBCs     NEUTROPHILS RELATIVE PERCENT 47 %  43-75   LYMPHOCYTES RELATIVE PERCENT 37 %  14-44   MONOCYTES RELATIVE PERCENT 10 %  4-12   EOSINOPHILS RELATIVE PERCENT 5 %  0-6   BASOPHILS RELATIVE PERCENT 1 %  0-1   NEUTROPHILS ABSOLUTE COUNT 2 69 Thousands/?L  1 85-7 62   LYMPHOCYTES ABSOLUTE COUNT 2 06 Thousands/?L  0 60-4 47   MONOCYTES ABSOLUTE COUNT 0 56 Thousand/?L  0 17-1 22   EOSINOPHILS ABSOLUTE COUNT 0 26 Thousand/?L  0 00-0 61   BASOPHILS ABSOLUTE COUNT 0 03 Thousands/?L  0 00-0 10   - Patient Instructions: This bloodwork is non-fasting  Please drink two glasses of water morning of bloodwork  (1) LIPID PANEL, FASTING 44Qfh7223 08:03AM Genevie January TW Order Number: [id number]   Order Number: [id number] Order Number: [id number] Patient Instructions: This is a fasting blood test  Water,black tea or black  coffee only after 9:00pm the night before test Drink 2 glasses of water the morning of test TW Order     Test Name Result Flag Reference   CHOLESTEROL 203 mg/dL H    HDL,DIRECT 55 mg/dL  40-60   Specimen collection should occur prior to Metamizole administration due to the potential for falsely depressed results  LDL CHOLESTEROL CALCULATED 127 mg/dL H 0-100   - Patient Instructions: This is a fasting blood test  Water,black tea or black  coffee only after 9:00pm the night before test   Drink 2 glasses of water the morning of test      Number: [id number] Patient Instructions: This bloodwork is non-fasting  Please drink two glasses of water morning of bloodwork    Triglyceride:         Normal              <150 mg/dl       Borderline High    150-199 mg/dl       High               200-499 mg/dl       Very High          >499 mg/dl  Cholesterol: Desirable        <200 mg/dl      Borderline High  200-239 mg/dl      High             >239 mg/dl  HDL Cholesterol:        High    >59 mg/dL      Low     <41 mg/dL  LDL CALCULATED:    This screening LDL is a calculated result  It does not have the accuracy of the Direct Measured LDL in the monitoring of patients with hyperlipidemia and/or statin therapy  Direct Measure LDL (KQC483) must be ordered separately in these patients  TRIGLYCERIDES 105 mg/dL  <=150   Specimen collection should occur prior to N-Acetylcysteine or Metamizole administration due to the potential for falsely depressed results  (1) TSH 99XAZ1764 08:03AM [email or web address]   TW Order Number: [id number]  TW Order Number: [id number] Order Number: [id number] Patient Instructions: This is a fasting blood test  Water,black tea or black  coffee only after 9:00pm the night before test Drink 2 glasses of water the morning of test TW Order     Test Name Result Flag Reference   TSH 3 030 uIU/mL  0 358-3 740   - Patient Instructions: This bloodwork is non-fasting  Please drink two glasses of water morning of bloodwork  Number: [id number]- Patient Instructions: This bloodwork is non-fasting  Please drink two glasses of water morning of bloodwork  Patients undergoing fluorescein dye angiography may retain small amounts of fluorescein in the body for 48-72 hours post procedure  Samples containing fluorescein can produce falsely depressed TSH values  If the patient had this procedure,a specimen should be resubmitted post fluorescein clearance            The recommended reference ranges for TSH during pregnancy are as follows:  First trimester 0 1 to 2 5 uIU/mL  Second trimester  0 2 to 3 0 uIU/mL  Third trimester 0 3 to 3 0 uIU/m     (1) HEMOGLOBIN A1C 53Ppt8484 08:03AM [email or web address]   TW Order Number: [id number]  TW Order Number: [id number]     Test Name Result Flag Reference HEMOGLOBIN A1C 7 9 % H 4 2-6 3   EST  AVG  GLUCOSE 180 mg/dl         Discussion/Summary   Excellent blood sugars!!!  Good work!!   We can decrease the invokamet to 1 in am and 1/2 tab at supper

## 2018-01-22 VITALS
HEART RATE: 80 BPM | TEMPERATURE: 96.4 F | WEIGHT: 141 LBS | BODY MASS INDEX: 23.46 KG/M2 | RESPIRATION RATE: 16 BRPM | DIASTOLIC BLOOD PRESSURE: 82 MMHG | SYSTOLIC BLOOD PRESSURE: 136 MMHG

## 2018-01-23 VITALS
HEART RATE: 74 BPM | RESPIRATION RATE: 16 BRPM | TEMPERATURE: 95.5 F | BODY MASS INDEX: 23.49 KG/M2 | WEIGHT: 141 LBS | DIASTOLIC BLOOD PRESSURE: 78 MMHG | SYSTOLIC BLOOD PRESSURE: 128 MMHG | HEIGHT: 65 IN

## 2018-02-26 NOTE — RESULT NOTES
Discussion/Summary   LABS ARE ALL VERY GOOD     Verified Results  (1) TSH WITH FT4 REFLEX 38PKI4602 09:29AM Sourav Fang    Order Number: [id number]     Test Name Result Flag Reference   TSH 4 240 uIU/mL H 0 358-3 740   Patients undergoing fluorescein dye angiography may retain small amounts of fluorescein in the body for 48-72 hours post procedure  Samples containing fluorescein can produce falsely depressed TSH values  If the patient had this procedure,a specimen should be resubmitted post fluorescein clearance  The recommended reference ranges for TSH during pregnancy are as follows:  First trimester 0 1 to 2 5 uIU/mL  Second trimester  0 2 to 3 0 uIU/mL  Third trimester 0 3 to 3 0 uIU/m   T4,FREE 0 80 ng/dL  0 76-1 46   Specimen collection should occur prior to Sulfasalazine administration due to the potential for falsely elevated results  (1) COMPREHENSIVE METABOLIC PANEL 72GVH2239 82:94CQ Le Roy Roller Order Number: [id number]     Test Name Result Flag Reference   SODIUM 139 mmol/L  136-145   POTASSIUM 4 0 mmol/L  3 5-5 3   CHLORIDE 102 mmol/L  100-108   CARBON DIOXIDE 30 mmol/L  21-32   ANION GAP (CALC) 7 mmol/L  4-13   BLOOD UREA NITROGEN 15 mg/dL  5-25   CREATININE 0 59 mg/dL L 0 60-1 30   Standardized to IDMS reference method   CALCIUM 9 7 mg/dL  8 3-10 1   BILI, TOTAL 0 54 mg/dL  0 20-1 00   ALK PHOSPHATAS 64 U/L     ALT (SGPT) 24 U/L  12-78   Specimen collection should occur prior to Sulfasalazine and/or Sulfapyridine administration due to the potential for falsely depressed results  AST(SGOT) 19 U/L  5-45   Specimen collection should occur prior to Sulfasalazine administration due to the potential for falsely depressed results     ALBUMIN 4 3 g/dL  3 5-5 0   TOTAL PROTEIN 7 9 g/dL  6 4-8 2   eGFR 92 ml/min/1 73sq m     National Kidney Disease Education Program recommendations are as follows:  GFR calculation is accurate only with a steady state creatinine  Chronic Kidney disease less than 60 ml/min/1 73 sq  meters  Kidney failure less than 15 ml/min/1 73 sq  meters  GLUCOSE FASTING 114 mg/dL H 65-99   Specimen collection should occur prior to Sulfasalazine administration due to the potential for falsely depressed results  Specimen collection should occur prior to Sulfapyridine administration due to the potential for falsely elevated results  (1) HEMOGLOBIN A1C 36UDD4199 09:29AM Sirisha Berry Order Number: [id number]     Test Name Result Flag Reference   HEMOGLOBIN A1C 6 0 %  4 2-6 3   EST  AVG  GLUCOSE 126 mg/dl       (1) LIPID PANEL, FASTING 74UMR1226 09:29AM Joan Marina    Order Number: [id number]     Test Name Result Flag Reference   CHOLESTEROL 243 mg/dL H    Cholesterol:    Desirable <200 mg/dl    Borderline 200-239 mg/dl    High>239   HDL,DIRECT 73 mg/dL H 40-60   HDL Cholesterol:    High>59 mg/dL    Low <41 mg/dL   LDL CHOLESTEROL CALCULATED 153 mg/dL H 0-100   This screening LDL is a calculated result  It does not have the accuracy of the Direct Measured LDL in the monitoring of patients with hyperlipidemia and/or statin therapy  Direct Measure LDL (MLI444) must be ordered separately in these patients  Triglyceride:        Normal <150 mg/dl   Borderline High 150-199 mg/dl   High 200-499 mg/dl   Very High >499 mg/dl   TRIGLYCERIDES 84 mg/dL  <=150   Specimen collection should occur prior to N-Acetylcysteine or Metamizole administration due to the potential for falsely depressed results

## 2018-02-26 NOTE — RESULT NOTES
Discussion/Summary   Vitamin D is right where it needs to be!!! Verified Results  (1) VITAMIN D 25-HYDROXY 41VXL4906 09:29AM Charles CONRAD Order Number: [id number]     Test Name Result Flag Reference   VIT D 25-HYDROX 49 6 ng/mL  30 0-100 0   This assay is a certified procedure of the CDC Vitamin D Standardization Certification Program (VDSCP)     Deficiency <20ng/ml   Insufficiency 20-30ng/ml   Sufficient  ng/ml     *Patients undergoing fluorescein dye angiography may retain small amounts of fluorescein in the body for 48-72 hours post procedure  Samples containing fluorescein can produce falsely elevated Vitamin D values  If the patient had this procedure, a specimen should be resubmitted post fluorescein clearance

## 2018-06-17 DIAGNOSIS — I10 ESSENTIAL (PRIMARY) HYPERTENSION: ICD-10-CM

## 2018-06-17 DIAGNOSIS — R94.6 ABNORMAL RESULTS OF THYROID FUNCTION STUDIES: ICD-10-CM

## 2018-06-17 DIAGNOSIS — E11.9 TYPE 2 DIABETES MELLITUS WITHOUT COMPLICATIONS (HCC): ICD-10-CM

## 2018-06-17 DIAGNOSIS — R73.09 OTHER ABNORMAL GLUCOSE: ICD-10-CM

## 2018-06-17 DIAGNOSIS — E78.5 HYPERLIPIDEMIA: ICD-10-CM

## 2018-06-29 ENCOUNTER — APPOINTMENT (OUTPATIENT)
Dept: LAB | Facility: CLINIC | Age: 73
End: 2018-06-29
Payer: MEDICARE

## 2018-06-29 DIAGNOSIS — R73.09 OTHER ABNORMAL GLUCOSE: ICD-10-CM

## 2018-06-29 DIAGNOSIS — E11.9 TYPE 2 DIABETES MELLITUS WITHOUT COMPLICATIONS (HCC): ICD-10-CM

## 2018-06-29 DIAGNOSIS — I10 ESSENTIAL (PRIMARY) HYPERTENSION: ICD-10-CM

## 2018-06-29 DIAGNOSIS — R94.6 ABNORMAL RESULTS OF THYROID FUNCTION STUDIES: ICD-10-CM

## 2018-06-29 DIAGNOSIS — E78.5 HYPERLIPIDEMIA: ICD-10-CM

## 2018-06-29 LAB
ALBUMIN SERPL BCP-MCNC: 4.2 G/DL (ref 3.5–5)
ALP SERPL-CCNC: 63 U/L (ref 46–116)
ALT SERPL W P-5'-P-CCNC: 25 U/L (ref 12–78)
ANION GAP SERPL CALCULATED.3IONS-SCNC: 7 MMOL/L (ref 4–13)
AST SERPL W P-5'-P-CCNC: 21 U/L (ref 5–45)
BASOPHILS # BLD AUTO: 0.05 THOUSANDS/ΜL (ref 0–0.1)
BASOPHILS NFR BLD AUTO: 1 % (ref 0–1)
BILIRUB SERPL-MCNC: 0.47 MG/DL (ref 0.2–1)
BUN SERPL-MCNC: 16 MG/DL (ref 5–25)
CALCIUM SERPL-MCNC: 9.7 MG/DL (ref 8.3–10.1)
CHLORIDE SERPL-SCNC: 104 MMOL/L (ref 100–108)
CHOLEST SERPL-MCNC: 244 MG/DL (ref 50–200)
CO2 SERPL-SCNC: 28 MMOL/L (ref 21–32)
CREAT SERPL-MCNC: 0.61 MG/DL (ref 0.6–1.3)
EOSINOPHIL # BLD AUTO: 0.32 THOUSAND/ΜL (ref 0–0.61)
EOSINOPHIL NFR BLD AUTO: 5 % (ref 0–6)
ERYTHROCYTE [DISTWIDTH] IN BLOOD BY AUTOMATED COUNT: 12.9 % (ref 11.6–15.1)
EST. AVERAGE GLUCOSE BLD GHB EST-MCNC: 128 MG/DL
GFR SERPL CREATININE-BSD FRML MDRD: 91 ML/MIN/1.73SQ M
GLUCOSE P FAST SERPL-MCNC: 104 MG/DL (ref 65–99)
HBA1C MFR BLD: 6.1 % (ref 4.2–6.3)
HCT VFR BLD AUTO: 42.7 % (ref 34.8–46.1)
HDLC SERPL-MCNC: 61 MG/DL (ref 40–60)
HGB BLD-MCNC: 13.9 G/DL (ref 11.5–15.4)
IMM GRANULOCYTES # BLD AUTO: 0.01 THOUSAND/UL (ref 0–0.2)
IMM GRANULOCYTES NFR BLD AUTO: 0 % (ref 0–2)
LDLC SERPL CALC-MCNC: 161 MG/DL (ref 0–100)
LYMPHOCYTES # BLD AUTO: 2.54 THOUSANDS/ΜL (ref 0.6–4.47)
LYMPHOCYTES NFR BLD AUTO: 39 % (ref 14–44)
MCH RBC QN AUTO: 29.7 PG (ref 26.8–34.3)
MCHC RBC AUTO-ENTMCNC: 32.6 G/DL (ref 31.4–37.4)
MCV RBC AUTO: 91 FL (ref 82–98)
MONOCYTES # BLD AUTO: 0.61 THOUSAND/ΜL (ref 0.17–1.22)
MONOCYTES NFR BLD AUTO: 9 % (ref 4–12)
NEUTROPHILS # BLD AUTO: 2.96 THOUSANDS/ΜL (ref 1.85–7.62)
NEUTS SEG NFR BLD AUTO: 46 % (ref 43–75)
NONHDLC SERPL-MCNC: 183 MG/DL
NRBC BLD AUTO-RTO: 0 /100 WBCS
PLATELET # BLD AUTO: 216 THOUSANDS/UL (ref 149–390)
PMV BLD AUTO: 10.3 FL (ref 8.9–12.7)
POTASSIUM SERPL-SCNC: 4.1 MMOL/L (ref 3.5–5.3)
PROT SERPL-MCNC: 8 G/DL (ref 6.4–8.2)
RBC # BLD AUTO: 4.68 MILLION/UL (ref 3.81–5.12)
SODIUM SERPL-SCNC: 139 MMOL/L (ref 136–145)
TRIGL SERPL-MCNC: 111 MG/DL
TSH SERPL DL<=0.05 MIU/L-ACNC: 6.01 UIU/ML (ref 0.36–3.74)
WBC # BLD AUTO: 6.49 THOUSAND/UL (ref 4.31–10.16)

## 2018-06-29 PROCEDURE — 80061 LIPID PANEL: CPT

## 2018-06-29 PROCEDURE — 84443 ASSAY THYROID STIM HORMONE: CPT

## 2018-06-29 PROCEDURE — 83036 HEMOGLOBIN GLYCOSYLATED A1C: CPT

## 2018-06-29 PROCEDURE — 85025 COMPLETE CBC W/AUTO DIFF WBC: CPT

## 2018-06-29 PROCEDURE — 80053 COMPREHEN METABOLIC PANEL: CPT

## 2018-06-29 PROCEDURE — 36415 COLL VENOUS BLD VENIPUNCTURE: CPT

## 2018-07-09 ENCOUNTER — TELEPHONE (OUTPATIENT)
Dept: FAMILY MEDICINE CLINIC | Facility: CLINIC | Age: 73
End: 2018-07-09

## 2018-07-10 ENCOUNTER — OFFICE VISIT (OUTPATIENT)
Dept: FAMILY MEDICINE CLINIC | Facility: CLINIC | Age: 73
End: 2018-07-10
Payer: MEDICARE

## 2018-07-10 VITALS
HEIGHT: 66 IN | TEMPERATURE: 96.6 F | HEART RATE: 76 BPM | RESPIRATION RATE: 16 BRPM | DIASTOLIC BLOOD PRESSURE: 76 MMHG | BODY MASS INDEX: 22.98 KG/M2 | SYSTOLIC BLOOD PRESSURE: 122 MMHG | WEIGHT: 143 LBS

## 2018-07-10 DIAGNOSIS — E06.3 HYPOTHYROIDISM DUE TO HASHIMOTO'S THYROIDITIS: ICD-10-CM

## 2018-07-10 DIAGNOSIS — E11.9 TYPE 2 DIABETES MELLITUS WITHOUT COMPLICATION, WITHOUT LONG-TERM CURRENT USE OF INSULIN (HCC): ICD-10-CM

## 2018-07-10 DIAGNOSIS — L98.9 LESION OF SKIN OF SCALP: ICD-10-CM

## 2018-07-10 DIAGNOSIS — I10 ESSENTIAL HYPERTENSION: ICD-10-CM

## 2018-07-10 DIAGNOSIS — E03.8 HYPOTHYROIDISM DUE TO HASHIMOTO'S THYROIDITIS: ICD-10-CM

## 2018-07-10 DIAGNOSIS — L21.9 SEBORRHEIC DERMATITIS OF SCALP: Primary | ICD-10-CM

## 2018-07-10 DIAGNOSIS — E55.9 VITAMIN D DEFICIENCY: ICD-10-CM

## 2018-07-10 DIAGNOSIS — E78.01 FAMILIAL HYPERCHOLESTEROLEMIA: ICD-10-CM

## 2018-07-10 PROBLEM — R79.89 ABNORMAL TSH: Status: ACTIVE | Noted: 2017-06-13

## 2018-07-10 PROCEDURE — 99214 OFFICE O/P EST MOD 30 MIN: CPT | Performed by: INTERNAL MEDICINE

## 2018-07-10 RX ORDER — LANCETS 28 GAUGE
1 EACH MISCELLANEOUS WEEKLY
COMMUNITY
Start: 2016-06-15 | End: 2019-01-22

## 2018-07-10 RX ORDER — ACETAMINOPHEN 325 MG/1
1 TABLET ORAL AS NEEDED
COMMUNITY
End: 2019-01-22

## 2018-07-10 RX ORDER — LEVOTHYROXINE SODIUM 0.03 MG/1
25 TABLET ORAL DAILY
Qty: 30 TABLET | Refills: 3 | Status: SHIPPED | OUTPATIENT
Start: 2018-07-10 | End: 2018-11-02 | Stop reason: SDUPTHER

## 2018-07-10 RX ORDER — BLOOD-GLUCOSE METER
KIT MISCELLANEOUS
Qty: 1 EACH | Refills: 0 | Status: SHIPPED | OUTPATIENT
Start: 2018-07-10 | End: 2019-01-22

## 2018-07-10 NOTE — ASSESSMENT & PLAN NOTE
Stable lipids  Follow up lipids in 6 months  Consider statin- pt is reluctant to start new med; repeat with improved thyroid level

## 2018-07-10 NOTE — PATIENT INSTRUCTIONS
Stay on womens multivitamin with vitamin d 1000 mcg daily  Labs in 8 weeks (8-12 weeks)  Start levothyroxine 25 mcg daily and get labs and follow up  Trial of cetirizine 10 mg (half a tab) at night for allergies

## 2018-07-10 NOTE — PROGRESS NOTES
ASSESSMENT and PLAN:  Edward De Leon is a 67 y o  female with:   Problem List Items Addressed This Visit     DMII (diabetes mellitus, type 2) ([facility] [address] )    Relevant Medications    glucose monitoring kit (FREESTYLE) monitoring kit    Hyperlipidemia     Stable lipids  Follow up lipids in 6 months  Consider statin- pt is reluctant to start new med; repeat with improved thyroid level  Hypertension    Seborrheic dermatitis of scalp - Primary     continue with ketoconazole  May improve with thyroid medication  Check vitamin d leve  Relevant Medications    minoxidil (ROGAINE) 2 % external solution    Other Relevant Orders    Ambulatory referral to Dermatology    Hypothyroidism due to Hashimoto's thyroiditis     Start levothyroxine 25 mcg daily and check tsh in 8 weeks            Relevant Medications    levothyroxine 25 mcg tablet    Other Relevant Orders    TSH, 3rd generation    Vitamin D deficiency     Pt currently takin mvi daily with vitamin d 1000 iu daily  Check vit d level with next blood draw          Relevant Orders    Vitamin D 25 hydroxy      Other Visit Diagnoses     Lesion of skin of scalp        Relevant Orders    Ambulatory referral to Dermatology          SUBJECTIVE:  Edward De Leon is a 67 y o  female who presents today with a chief complaint of Follow-up (labs done 6/29/18)    Patient here for follow up; she feels poorly- her allergies are bad this year; her seborrhea is worse- she got the ketoconoazole but has not used it   ; now she has crusting of left ear  Benadryl has helped  She has scaly lesions on her legs  She has "tremendous muscle pain"; She has pain in her low back and her buttock; Her right trigger finger is better  up9on awakening she has back pain, head pain  Stiffness gets better in an hour upon awakening  She has tinnitus in left ear- slightly better  claritin has not helped  She is suspicious of "chronic strep throat" since she had it at age 13     She had chicken pox in her 20's and it took "6 weeks for the lesions to form)  Depression is much better since starting vitamin d   Review of Systems   Constitutional: Negative  HENT: Negative  Eyes: Negative  Respiratory: Negative  Cardiovascular: Negative  Gastrointestinal: Negative  Endocrine: Negative  Genitourinary: Negative  Musculoskeletal: Positive for arthralgias and back pain  Pain in tailbone   Skin: Positive for rash  Scalp irritation, scaly of left ear   Allergic/Immunologic: Negative  Neurological: Negative  Hematological: Negative  Psychiatric/Behavioral: Negative  I have reviewed the patient's PMH, Social History, Medication List and Allergies  OBJECTIVE:  /76   Pulse 76   Temp (!) 96 6 °F (35 9 °C)   Resp 16   Ht 5' 6" (1 676 m)   Wt 64 9 kg (143 lb)   BMI 23 08 kg/m²   Physical Exam   Constitutional: She is oriented to person, place, and time  She appears well-developed and well-nourished  HENT:   Head: Normocephalic and atraumatic  Right Ear: External ear normal    Left Ear: External ear normal    Nose: Nose normal    Mouth/Throat: Oropharynx is clear and moist    Eyes: Conjunctivae and EOM are normal  Pupils are equal, round, and reactive to light  Right eye exhibits no discharge  Left eye exhibits no discharge  No scleral icterus  Neck: Normal range of motion  Neck supple  No JVD present  No thyromegaly present  Cardiovascular: Normal rate, regular rhythm, normal heart sounds and intact distal pulses  Pulmonary/Chest: Effort normal and breath sounds normal  No respiratory distress  She has no wheezes  She has no rales  Abdominal: Soft  Bowel sounds are normal  She exhibits no distension  There is no tenderness  There is no rebound  Musculoskeletal: Normal range of motion  She exhibits tenderness (low back pain, pain in legs  )  She exhibits no edema     Neurological: She is alert and oriented to person, place, and time  She has normal reflexes  Coordination normal    Skin: Skin is warm and dry  No rash noted  No erythema  Psychiatric: She has a normal mood and affect  Her behavior is normal  Judgment and thought content normal    Nursing note and vitals reviewed

## 2018-07-11 ENCOUNTER — TELEPHONE (OUTPATIENT)
Dept: FAMILY MEDICINE CLINIC | Facility: CLINIC | Age: 73
End: 2018-07-11

## 2018-07-11 NOTE — TELEPHONE ENCOUNTER
Lm for pt to return call    ----- Message from [facility], DO sent at 7/9/2018 12:30 PM EDT -----  pls call pt - I reviewed bp and bs- all very good -  Keep up the good work-  Labs in December

## 2018-08-08 ENCOUNTER — TELEPHONE (OUTPATIENT)
Dept: FAMILY MEDICINE CLINIC | Facility: CLINIC | Age: 73
End: 2018-08-08

## 2018-10-29 ENCOUNTER — APPOINTMENT (OUTPATIENT)
Dept: LAB | Facility: CLINIC | Age: 73
End: 2018-10-29
Payer: MEDICARE

## 2018-10-29 DIAGNOSIS — E06.3 HYPOTHYROIDISM DUE TO HASHIMOTO'S THYROIDITIS: ICD-10-CM

## 2018-10-29 DIAGNOSIS — E55.9 VITAMIN D DEFICIENCY: ICD-10-CM

## 2018-10-29 DIAGNOSIS — E03.8 HYPOTHYROIDISM DUE TO HASHIMOTO'S THYROIDITIS: ICD-10-CM

## 2018-10-29 LAB
25(OH)D3 SERPL-MCNC: 34.8 NG/ML (ref 30–100)
TSH SERPL DL<=0.05 MIU/L-ACNC: 2.08 UIU/ML (ref 0.36–3.74)

## 2018-10-29 PROCEDURE — 82306 VITAMIN D 25 HYDROXY: CPT

## 2018-10-29 PROCEDURE — 84443 ASSAY THYROID STIM HORMONE: CPT

## 2018-10-29 PROCEDURE — 36415 COLL VENOUS BLD VENIPUNCTURE: CPT

## 2018-11-02 DIAGNOSIS — E03.8 HYPOTHYROIDISM DUE TO HASHIMOTO'S THYROIDITIS: ICD-10-CM

## 2018-11-02 DIAGNOSIS — E06.3 HYPOTHYROIDISM DUE TO HASHIMOTO'S THYROIDITIS: ICD-10-CM

## 2018-11-02 RX ORDER — LEVOTHYROXINE SODIUM 0.03 MG/1
25 TABLET ORAL DAILY
Qty: 90 TABLET | Refills: 0 | Status: SHIPPED | OUTPATIENT
Start: 2018-11-02 | End: 2019-01-22 | Stop reason: SDUPTHER

## 2019-01-14 ENCOUNTER — TELEPHONE (OUTPATIENT)
Dept: FAMILY MEDICINE CLINIC | Facility: CLINIC | Age: 74
End: 2019-01-14

## 2019-01-18 ENCOUNTER — OFFICE VISIT (OUTPATIENT)
Dept: BEHAVIORAL/MENTAL HEALTH CLINIC | Facility: CLINIC | Age: 74
End: 2019-01-18
Payer: COMMERCIAL

## 2019-01-18 DIAGNOSIS — F31.5 BIPOLAR DISORD, CRNT EPSD DEPRESS, SEVERE, W PSYCH FEATURES (HCC): ICD-10-CM

## 2019-01-18 DIAGNOSIS — F42.8 OTHER OBSESSIVE-COMPULSIVE DISORDERS: Primary | ICD-10-CM

## 2019-01-18 PROBLEM — E06.3 HYPOTHYROIDISM DUE TO HASHIMOTO'S THYROIDITIS: Status: RESOLVED | Noted: 2018-07-10 | Resolved: 2019-01-18

## 2019-01-18 PROBLEM — E03.8 HYPOTHYROIDISM DUE TO HASHIMOTO'S THYROIDITIS: Status: RESOLVED | Noted: 2018-07-10 | Resolved: 2019-01-18

## 2019-01-18 PROCEDURE — 90834 PSYTX W PT 45 MINUTES: CPT | Performed by: SOCIAL WORKER

## 2019-01-18 RX ORDER — LORAZEPAM 0.5 MG/1
TABLET ORAL
Qty: 6 TABLET | Refills: 0 | Status: SHIPPED | OUTPATIENT
Start: 2019-01-18 | End: 2019-01-22

## 2019-01-18 NOTE — PSYCH
Assessment/Plan:      Diagnoses and all orders for this visit:    Other obsessive-compulsive disorders    Bipolar disord, crnt epsd depress, severe, w psych features [facility])          Subjective:     Patient ID: Natalie Goins is a 68 y o  female  Seen with Simone Dee, son - father developed terminal infection end of October - living at home on [address] bed in living room, not sleeping, not functionining, not even making phone calls - sleep a few hours - feels house had bugs, not washing properly - feels lost and slow - son says not functioning the way she always has despite delusions/paranoia/depression for entire life  Pt has 3 boys, two in [facility], tacho in [place]  Hx of depression, OCD, Bipolar - at 3yo - reaction to smallpox vaccine - very ill after 2nd dose  Was driving but not as much now, doesn't trust helper in house - thinks she's moving things and doing "bad things" but can't articulate - pt worked for yrs and did well   Jamal Dung,  of 40+yrs , retired [employer] then [employer] - retired about 4yrs ago  Pt recently has precancerous issues on scalp that need removal - son helping mother with medical needs  Obessive behaviors/thoughts about issues - worried about gloves in house, people out to get her, seeing bugs son reports functioning very well for many yrs - pt's  helped  New skin cancer diagnosis - needs procedure to remove  Lithium in past - helpful, tried Prozac but not sure - no meds since 90s  Son requested help for mom to relax and get sleep to improve functioning - discussed with Dr Triny Landaverde who ordered PRN anxiety meds for the weekend and will see pt on Tuesday - educated pt on importance of improving functioning, self care, sleep in order to spend remaining time with      Lab slips given for blood work to be completed prior to Tuesday's appt - appt for next week with me as well    HPI    Review of Systems      Objective:     Physical Exam  Pt was paranoid, rambling at times, anxious, sad, distrusting, denies suicidal/homicidal ideations, visual hallucinations of bugs in home, poor insight/judgemnet, delusional

## 2019-01-21 ENCOUNTER — TELEPHONE (OUTPATIENT)
Dept: FAMILY MEDICINE CLINIC | Facility: CLINIC | Age: 74
End: 2019-01-21

## 2019-01-21 NOTE — TELEPHONE ENCOUNTER
Patient's daughter in law Carolyn Palacios  called stating patient is not having a good day at home  She stated she had to be on the phone with patient for 45 min to calm her down and that patient is very paranoid and stressed  She stated patient has an apt with you tomorrow but she wanted to see if there is another alternative for patient  instead of the Ativan because its not working or can the dosage increase  Please advise    [phone]

## 2019-01-21 NOTE — TELEPHONE ENCOUNTER
Unfortunately, there isn't anything that I can send in in addition to the Ativan without evaluating her myself  If her anxiety/paranoia is acutely out of control, they can take her to the ER and they may be able to give her a sedative in the moment, but she will need evaluation including blood work and urine tests  I'll have to do the same, but they can do it faster in the moment

## 2019-01-21 NOTE — PATIENT INSTRUCTIONS
Take prescribed meds over weekend, get blood work completed, see Dr Natali Reyna on Tuesday, son will support in home for now, Follow up in one week - call earlier if needed

## 2019-01-22 ENCOUNTER — HOSPITAL ENCOUNTER (INPATIENT)
Facility: HOSPITAL | Age: 74
LOS: 2 days | Discharge: HOME | DRG: 885 | End: 2019-01-25
Attending: PSYCHIATRY & NEUROLOGY | Admitting: PSYCHIATRY & NEUROLOGY
Payer: COMMERCIAL

## 2019-01-22 ENCOUNTER — HOSPITAL ENCOUNTER (EMERGENCY)
Facility: HOSPITAL | Age: 74
End: 2019-01-22
Attending: EMERGENCY MEDICINE
Payer: COMMERCIAL

## 2019-01-22 ENCOUNTER — LAB (OUTPATIENT)
Dept: LAB | Facility: CLINIC | Age: 74
End: 2019-01-22
Payer: COMMERCIAL

## 2019-01-22 ENCOUNTER — OFFICE VISIT (OUTPATIENT)
Dept: FAMILY MEDICINE CLINIC | Facility: CLINIC | Age: 74
End: 2019-01-22
Payer: COMMERCIAL

## 2019-01-22 VITALS
DIASTOLIC BLOOD PRESSURE: 64 MMHG | WEIGHT: 122 LBS | HEART RATE: 74 BPM | RESPIRATION RATE: 15 BRPM | TEMPERATURE: 97.3 F | SYSTOLIC BLOOD PRESSURE: 104 MMHG | BODY MASS INDEX: 19.61 KG/M2 | HEIGHT: 66 IN

## 2019-01-22 VITALS
BODY MASS INDEX: 19.61 KG/M2 | WEIGHT: 122 LBS | HEART RATE: 75 BPM | DIASTOLIC BLOOD PRESSURE: 75 MMHG | RESPIRATION RATE: 16 BRPM | TEMPERATURE: 97.3 F | OXYGEN SATURATION: 97 % | HEIGHT: 66 IN | SYSTOLIC BLOOD PRESSURE: 160 MMHG

## 2019-01-22 DIAGNOSIS — E55.9 VITAMIN D DEFICIENCY: ICD-10-CM

## 2019-01-22 DIAGNOSIS — E78.00 PURE HYPERCHOLESTEROLEMIA: ICD-10-CM

## 2019-01-22 DIAGNOSIS — F33.3 SEVERE EPISODE OF RECURRENT MAJOR DEPRESSIVE DISORDER, WITH PSYCHOTIC FEATURES (HCC): Primary | ICD-10-CM

## 2019-01-22 DIAGNOSIS — E11.9 TYPE 2 DIABETES MELLITUS WITHOUT COMPLICATION, WITHOUT LONG-TERM CURRENT USE OF INSULIN (HCC): ICD-10-CM

## 2019-01-22 DIAGNOSIS — E46 PROTEIN-CALORIE MALNUTRITION, UNSPECIFIED SEVERITY (HCC): ICD-10-CM

## 2019-01-22 DIAGNOSIS — Z00.8 MEDICAL CLEARANCE FOR PSYCHIATRIC ADMISSION: ICD-10-CM

## 2019-01-22 DIAGNOSIS — F32.3 MAJOR DEPRESSION WITH PSYCHOTIC FEATURES (HCC): Primary | ICD-10-CM

## 2019-01-22 DIAGNOSIS — E03.9 ACQUIRED HYPOTHYROIDISM: ICD-10-CM

## 2019-01-22 DIAGNOSIS — R79.89 ABNORMAL TSH: ICD-10-CM

## 2019-01-22 DIAGNOSIS — I10 ESSENTIAL HYPERTENSION: ICD-10-CM

## 2019-01-22 DIAGNOSIS — F22 PARANOIA (PSYCHOSIS) (HCC): Primary | ICD-10-CM

## 2019-01-22 DIAGNOSIS — F42.8 OTHER OBSESSIVE-COMPULSIVE DISORDERS: ICD-10-CM

## 2019-01-22 DIAGNOSIS — Z00.8 MEDICAL CLEARANCE FOR PSYCHIATRIC ADMISSION: Primary | ICD-10-CM

## 2019-01-22 DIAGNOSIS — K76.0 FATTY INFILTRATION OF LIVER: ICD-10-CM

## 2019-01-22 DIAGNOSIS — C43.4 MALIGNANT MELANOMA OF SCALP (HCC): ICD-10-CM

## 2019-01-22 PROBLEM — L21.9 SEBORRHEIC DERMATITIS OF SCALP: Status: RESOLVED | Noted: 2017-06-13 | Resolved: 2019-01-22

## 2019-01-22 LAB
ALBUMIN SERPL BCP-MCNC: 3.8 G/DL (ref 3.5–5)
ALP SERPL-CCNC: 61 U/L (ref 46–116)
ALT SERPL W P-5'-P-CCNC: 63 U/L (ref 12–78)
ANION GAP SERPL CALCULATED.3IONS-SCNC: 5 MMOL/L (ref 4–13)
AST SERPL W P-5'-P-CCNC: 31 U/L (ref 5–45)
BACTERIA UR QL AUTO: NORMAL /HPF
BASOPHILS # BLD AUTO: 0.02 THOUSANDS/ΜL (ref 0–0.1)
BASOPHILS NFR BLD AUTO: 0 % (ref 0–1)
BILIRUB SERPL-MCNC: 0.7 MG/DL (ref 0.2–1)
BILIRUB UR QL STRIP: NEGATIVE
BUN SERPL-MCNC: 12 MG/DL (ref 5–25)
CALCIUM SERPL-MCNC: 9.2 MG/DL (ref 8.3–10.1)
CHLORIDE SERPL-SCNC: 102 MMOL/L (ref 100–108)
CHOLEST SERPL-MCNC: 205 MG/DL (ref 50–200)
CLARITY UR: CLEAR
CO2 SERPL-SCNC: 32 MMOL/L (ref 21–32)
COLOR UR: YELLOW
CREAT SERPL-MCNC: 0.66 MG/DL (ref 0.6–1.3)
CREAT UR-MCNC: <13 MG/DL
EOSINOPHIL # BLD AUTO: 0.05 THOUSAND/ΜL (ref 0–0.61)
EOSINOPHIL NFR BLD AUTO: 1 % (ref 0–6)
ERYTHROCYTE [DISTWIDTH] IN BLOOD BY AUTOMATED COUNT: 13.2 % (ref 11.6–15.1)
EST. AVERAGE GLUCOSE BLD GHB EST-MCNC: 148 MG/DL
GFR SERPL CREATININE-BSD FRML MDRD: 88 ML/MIN/1.73SQ M
GLUCOSE P FAST SERPL-MCNC: 116 MG/DL (ref 65–99)
GLUCOSE UR STRIP-MCNC: NEGATIVE MG/DL
HBA1C MFR BLD: 6.8 % (ref 4.2–6.3)
HCT VFR BLD AUTO: 42.3 % (ref 34.8–46.1)
HDLC SERPL-MCNC: 77 MG/DL (ref 40–60)
HGB BLD-MCNC: 13.9 G/DL (ref 11.5–15.4)
HGB UR QL STRIP.AUTO: NEGATIVE
IMM GRANULOCYTES # BLD AUTO: 0.02 THOUSAND/UL (ref 0–0.2)
IMM GRANULOCYTES NFR BLD AUTO: 0 % (ref 0–2)
KETONES UR STRIP-MCNC: NEGATIVE MG/DL
LDLC SERPL CALC-MCNC: 114 MG/DL (ref 0–100)
LEUKOCYTE ESTERASE UR QL STRIP: ABNORMAL
LYMPHOCYTES # BLD AUTO: 1.12 THOUSANDS/ΜL (ref 0.6–4.47)
LYMPHOCYTES NFR BLD AUTO: 18 % (ref 14–44)
MCH RBC QN AUTO: 30.3 PG (ref 26.8–34.3)
MCHC RBC AUTO-ENTMCNC: 32.9 G/DL (ref 31.4–37.4)
MCV RBC AUTO: 92 FL (ref 82–98)
MICROALBUMIN UR-MCNC: <5 MG/L (ref 0–20)
MONOCYTES # BLD AUTO: 0.59 THOUSAND/ΜL (ref 0.17–1.22)
MONOCYTES NFR BLD AUTO: 9 % (ref 4–12)
NEUTROPHILS # BLD AUTO: 4.56 THOUSANDS/ΜL (ref 1.85–7.62)
NEUTS SEG NFR BLD AUTO: 72 % (ref 43–75)
NITRITE UR QL STRIP: NEGATIVE
NON-SQ EPI CELLS URNS QL MICRO: NORMAL /HPF
NRBC BLD AUTO-RTO: 0 /100 WBCS
PH UR STRIP.AUTO: 7 [PH] (ref 4.5–8)
PLATELET # BLD AUTO: 236 THOUSANDS/UL (ref 149–390)
PMV BLD AUTO: 10.1 FL (ref 8.9–12.7)
POTASSIUM SERPL-SCNC: 4.1 MMOL/L (ref 3.5–5.3)
PROT SERPL-MCNC: 6.8 G/DL (ref 6.4–8.2)
PROT UR STRIP-MCNC: NEGATIVE MG/DL
RBC # BLD AUTO: 4.58 MILLION/UL (ref 3.81–5.12)
RBC #/AREA URNS AUTO: NORMAL /HPF
SODIUM SERPL-SCNC: 139 MMOL/L (ref 136–145)
SP GR UR STRIP.AUTO: 1.02 (ref 1–1.03)
TRIGL SERPL-MCNC: 69 MG/DL
TSH SERPL DL<=0.05 MIU/L-ACNC: 1.81 UIU/ML (ref 0.36–3.74)
UROBILINOGEN UR QL STRIP.AUTO: 0.2 E.U./DL
WBC # BLD AUTO: 6.36 THOUSAND/UL (ref 4.31–10.16)
WBC #/AREA URNS AUTO: NORMAL /HPF

## 2019-01-22 PROCEDURE — 82043 UR ALBUMIN QUANTITATIVE: CPT

## 2019-01-22 PROCEDURE — 3074F SYST BP LT 130 MM HG: CPT | Performed by: FAMILY MEDICINE

## 2019-01-22 PROCEDURE — 3078F DIAST BP <80 MM HG: CPT | Performed by: FAMILY MEDICINE

## 2019-01-22 PROCEDURE — 81001 URINALYSIS AUTO W/SCOPE: CPT

## 2019-01-22 PROCEDURE — 80053 COMPREHEN METABOLIC PANEL: CPT | Performed by: FAMILY MEDICINE

## 2019-01-22 PROCEDURE — 99215 OFFICE O/P EST HI 40 MIN: CPT | Performed by: FAMILY MEDICINE

## 2019-01-22 PROCEDURE — 36415 COLL VENOUS BLD VENIPUNCTURE: CPT

## 2019-01-22 PROCEDURE — 85025 COMPLETE CBC W/AUTO DIFF WBC: CPT | Performed by: FAMILY MEDICINE

## 2019-01-22 PROCEDURE — 80061 LIPID PANEL: CPT

## 2019-01-22 PROCEDURE — 82746 ASSAY OF FOLIC ACID SERUM: CPT | Performed by: PSYCHIATRY & NEUROLOGY

## 2019-01-22 PROCEDURE — 83036 HEMOGLOBIN GLYCOSYLATED A1C: CPT

## 2019-01-22 PROCEDURE — 1101F PT FALLS ASSESS-DOCD LE1/YR: CPT | Performed by: FAMILY MEDICINE

## 2019-01-22 PROCEDURE — 93005 ELECTROCARDIOGRAM TRACING: CPT

## 2019-01-22 PROCEDURE — 84443 ASSAY THYROID STIM HORMONE: CPT | Performed by: FAMILY MEDICINE

## 2019-01-22 PROCEDURE — 82570 ASSAY OF URINE CREATININE: CPT

## 2019-01-22 PROCEDURE — 99285 EMERGENCY DEPT VISIT HI MDM: CPT

## 2019-01-22 RX ORDER — OLANZAPINE 10 MG/1
5 INJECTION, POWDER, LYOPHILIZED, FOR SOLUTION INTRAMUSCULAR EVERY 6 HOURS PRN
Status: CANCELLED | OUTPATIENT
Start: 2019-01-22

## 2019-01-22 RX ORDER — TRAZODONE HYDROCHLORIDE 50 MG/1
50 TABLET ORAL
Status: CANCELLED | OUTPATIENT
Start: 2019-01-22

## 2019-01-22 RX ORDER — LEVOTHYROXINE SODIUM 0.03 MG/1
25 TABLET ORAL DAILY
Qty: 90 TABLET | Refills: 1 | Status: SHIPPED | OUTPATIENT
Start: 2019-01-22 | End: 2019-03-04

## 2019-01-22 RX ORDER — ACETAMINOPHEN 325 MG/1
650 TABLET ORAL EVERY 4 HOURS PRN
Status: CANCELLED | OUTPATIENT
Start: 2019-01-22

## 2019-01-22 RX ORDER — ACETAMINOPHEN 325 MG/1
650 TABLET ORAL EVERY 6 HOURS PRN
Status: CANCELLED | OUTPATIENT
Start: 2019-01-22

## 2019-01-22 RX ORDER — QUETIAPINE FUMARATE 25 MG/1
25 TABLET, FILM COATED ORAL
Qty: 30 TABLET | Refills: 1 | Status: CANCELLED | OUTPATIENT
Start: 2019-01-22

## 2019-01-22 RX ORDER — ACETAMINOPHEN 325 MG/1
975 TABLET ORAL EVERY 6 HOURS PRN
Status: CANCELLED | OUTPATIENT
Start: 2019-01-22

## 2019-01-22 RX ORDER — HALOPERIDOL 5 MG/ML
5 INJECTION INTRAMUSCULAR EVERY 4 HOURS PRN
Status: CANCELLED | OUTPATIENT
Start: 2019-01-22

## 2019-01-22 RX ORDER — LORAZEPAM 2 MG/ML
1 INJECTION INTRAMUSCULAR EVERY 4 HOURS PRN
Status: CANCELLED | OUTPATIENT
Start: 2019-01-22

## 2019-01-22 RX ORDER — HYDROXYZINE HYDROCHLORIDE 25 MG/1
25 TABLET, FILM COATED ORAL EVERY 4 HOURS PRN
Status: CANCELLED | OUTPATIENT
Start: 2019-01-22

## 2019-01-22 RX ORDER — ALPRAZOLAM 0.25 MG/1
0.5 TABLET ORAL ONCE
Status: COMPLETED | OUTPATIENT
Start: 2019-01-22 | End: 2019-01-22

## 2019-01-22 RX ORDER — MAGNESIUM HYDROXIDE/ALUMINUM HYDROXICE/SIMETHICONE 120; 1200; 1200 MG/30ML; MG/30ML; MG/30ML
30 SUSPENSION ORAL EVERY 4 HOURS PRN
Status: CANCELLED | OUTPATIENT
Start: 2019-01-22

## 2019-01-22 RX ORDER — BENZTROPINE MESYLATE 1 MG/ML
1 INJECTION INTRAMUSCULAR; INTRAVENOUS
Status: CANCELLED | OUTPATIENT
Start: 2019-01-22

## 2019-01-22 RX ORDER — RISPERIDONE 1 MG/1
1 TABLET, ORALLY DISINTEGRATING ORAL
Status: CANCELLED | OUTPATIENT
Start: 2019-01-22

## 2019-01-22 RX ADMIN — ALPRAZOLAM 0.5 MG: 0.25 TABLET ORAL at 18:45

## 2019-01-22 NOTE — ASSESSMENT & PLAN NOTE
Last Lipid Panel:  Lab Results   Component Value Date    CHOLESTEROL 205 (H) 01/22/2019    HDL 77 (H) 01/22/2019    TRIG 69 01/22/2019    [place] [address] 06/29/2018     The 10-year ASCVD risk score (Luz Maria Dobson et al , 2013) is: 30%    Values used to calculate the score:      Age: 68 years      Sex: Female      Is Non- : No      Diabetic: Yes      Tobacco smoker: No      Systolic Blood Pressure: 375 mmHg      Is BP treated: No      HDL Cholesterol: 77 mg/dL      Total Cholesterol: 205 mg/dL    Given her age and comorbidities, will not start statin at this time

## 2019-01-22 NOTE — ASSESSMENT & PLAN NOTE
Labs:  Hemoglobin A1C   Date Value Ref Range Status   01/22/2019 6 8 (H) 4 2 - 6 3 % Final   06/29/2018 6 1 4 2 - 6 3 % Final   01/11/2018 6 0 4 2 - 6 3 % Final     Lab Results   Component Value Date    HDL 77 (H) 01/22/2019    LDLCALC 114 (H) 01/22/2019    TRIG 69 01/22/2019     Lab Results   Component Value Date    GLUF 116 (H) 01/22/2019    CREATININE 0 66 01/22/2019    MICROALBCRE <29 09/06/2017     Previously diet controlled, but in the diabetic range now  Given acuity of psychiatric issues, will defer starting meds until a later date, or continue to monitor  Normal diabetic foot exam today

## 2019-01-22 NOTE — ASSESSMENT & PLAN NOTE
BP Readings from Last 3 Encounters:   01/22/19 149/70   01/22/19 104/64   07/10/18 122/76     Not on any medications currently, usually well controlled, likely elevated today due to acute psychiatric issues

## 2019-01-22 NOTE — ASSESSMENT & PLAN NOTE
Lab Results   Component Value Date    [id number] 1 807 01/22/2019     Well controlled on Synthroid 25mcg  Refilled today

## 2019-01-22 NOTE — ASSESSMENT & PLAN NOTE
Pt has two spots on her scalp that she was told are melanomas and need removing  She is unsure of if she is seeing Dr Alycia Bailey or Dr Henry Mohan  She is scheduled for biopsy, but my need surgical excision, so is confused about the next step  Will reach out to their offices to clarify

## 2019-01-22 NOTE — ASSESSMENT & PLAN NOTE
Lab Results   Component Value Date    ALT 63 01/22/2019    AST 31 01/22/2019    ALKPHOS 61 01/22/2019     Normal LFTs

## 2019-01-22 NOTE — ED NOTES
Intake / Safety assessment completed with patient and patient's daughter in law who was here with patient  Patient presents to ED at the suggestion of her PCP due to increased depression as well as anxiety  Patient reports increased stressors related to caring for her  who is terminally ill  Patient reports feelings of despair as well as a sense of hopelessness  Patient denies suicidal ideation or homicidal ideation  She does however states she does not care  if she lives or dies  Patient very overwhelmed and has poor concentration  Patient reports poor sleep and loss of appetite  Reports not being able to function through out the day  Very tearful the whole time she spoke with crisis  Willing to sign 201 for inpatient care, but is very scared  After lengthy discussion patient signed [phone] which was faxed to Norah Randolph [address] [place] [address] for review

## 2019-01-22 NOTE — PROGRESS NOTES
FAMILY MEDICINE PROGRESS NOTE  Natalie Goins 68 y o  female   DATE: January 22, 2019     ASSESSMENT and PLAN:  Natalie Goins is a 68 y o  female with:     Severe episode of recurrent major depressive disorder, with psychotic features [facility])  Patient was initially here to establish care with me and management of her chronic issues, but she has been having concerning symptoms lst Friday, 1/18 when she met with our in-office therapist and the therapist recommended a medication to help acutely with anxiety/agitation  I empirically sent in Ativan PRN, but the pt did not tolerate that well  Today, when I am evaluating her she is endorsing suicidal ideation, "I wish I would die just like my  is," she is tearful and endorses auditory and visual hallucinations along with paranoid and delusional thoughts  She has a long history of psychiatric issues and in the past was on multiple psych meds, but the only one she can remember is Lithium  I would consider a low dose antipsychotic medication for her, but given the acuity, I recommended ER evaluation by [facility] for placement or a possible partial program  Eventually, she should be set up with social work as well since the acute worsening of her psychiatric condition is due to her 's illness and her inability top provide care any longer  She will need to follow up with Psych ASAP  - Ambulatory referral to Social Work; Future  - Ambulatory referral to Psychiatry;  Future  - Transfer to other facility      Fatty infiltration of liver  Lab Results   Component Value Date    ALT 63 01/22/2019    AST 31 01/22/2019    ALKPHOS 61 01/22/2019     Normal LFTs    Pure hypercholesterolemia  Last Lipid Panel:  Lab Results   Component Value Date    CHOLESTEROL 205 (H) 01/22/2019    HDL 77 (H) 01/22/2019    TRIG 69 01/22/2019    [place] [address] 06/29/2018     The 10-year ASCVD risk score (James Carreon et al , 2013) is: 30%    Values used to calculate the score:      Age: 68 years Sex: Female      Is Non- : No      Diabetic: Yes      Tobacco smoker: No      Systolic Blood Pressure: 592 mmHg      Is BP treated: No      HDL Cholesterol: 77 mg/dL      Total Cholesterol: 205 mg/dL    Given her age and comorbidities, will not start statin at this time    Acquired hypothyroidism  Lab Results   Component Value Date    [id number] 1 807 01/22/2019     Well controlled on Synthroid 25mcg  Refilled today      Vitamin D deficiency  Maybe contributing to psychiatric symptoms, in the past her level was 8, recheck Vit D level    Essential hypertension  BP Readings from Last 3 Encounters:   01/22/19 149/70   01/22/19 104/64   07/10/18 122/76     Not on any medications currently, usually well controlled, likely elevated today due to acute psychiatric issues    Type 2 diabetes mellitus without complication, without long-term current use of insulin ([facility] [address] )  Labs:  Hemoglobin A1C   Date Value Ref Range Status   01/22/2019 6 8 (H) 4 2 - 6 3 % Final   06/29/2018 6 1 4 2 - 6 3 % Final   01/11/2018 6 0 4 2 - 6 3 % Final     Lab Results   Component Value Date    HDL 77 (H) 01/22/2019    LDLCALC 114 (H) 01/22/2019    TRIG 69 01/22/2019     Lab Results   Component Value Date    GLUF 116 (H) 01/22/2019    CREATININE 0 66 01/22/2019    MICROALBCRE <29 09/06/2017     Previously diet controlled, but in the diabetic range now  Given acuity of psychiatric issues, will defer starting meds until a later date, or continue to monitor  Normal diabetic foot exam today      Malignant melanoma of scalp ([facility] [address] )  Pt has two spots on her scalp that she was told are melanomas and need removing  She is unsure of if she is seeing Dr Dung Mayes or Dr Lilo Hoffman  She is scheduled for biopsy, but my need surgical excision, so is confused about the next step  Will reach out to their offices to clarify       I have spent 60 minutes with Patient and patient's daughter-in-law today in which greater than 50% of this time was spent in counseling/coordination of care regarding Risks and benefits of tx options, Intructions for management, Patient and family education, Importance of treatment compliance and Impressions  RTC after psych evaluation for management of chronic medical conditions    SUBJECTIVE:  Landen Vo is a 68 y o  female who presents today with a chief complaint of Weight Loss (another 20 lb weight loss since last visit); Encopresis; Vaginal Bleeding (Urine dark); Melanoma (on top of head); and Memory Loss  Pt is here with her daughter-in-law, Melissa Miranda, to establish care with me  Pt states she is unable to take care of her dying , unable to drive, she needs help getting around, she cant take care of herself  She feels like she wants to "go when her  goes " Her  is on hospice for Creutzfeld-Bimal disease  She has visual hallucinations- "bugs in the bathroom " She has auditory hallucinations "radio or people's voices"  She thinks there are aides that are trying to poison her food  She does have guns in the home and are in a locked box, but is afraid some of the aides will steal it  She has a long history of psychiatric disorder with depression, OCD  In the past, had been on Lithium and many other meds  Over the past week, she has been having agitation and had a confrontation yesterday evening and took 2 Ativan tabs  She has not been sleeping well at all, at the most 4 hours/night  She states she has been non-compliant with meds, other than her Synthroid  She has a history of Vitamin D deficiency, not on any meds  She has had weight loss over the past few months  Review of Systems   Unable to perform ROS: Acuity of condition     I have reviewed the patient's PMH, Social History, Medication List and Allergies as appropriate        OBJECTIVE:  /64 (BP Location: Left arm, Patient Position: Sitting, Cuff Size: Standard)   Pulse 74   Temp (!) 97 3 °F (36 3 °C)   Resp 15   Ht 5' 6" (1 676 m)   Wt 55 3 kg (122 lb)   Breastfeeding? No   BMI 19 69 kg/m²    Physical Exam   Constitutional: She appears well-developed and well-nourished  No distress  Cardiovascular: Pulses are no weak pulses  Pulses:       Dorsalis pedis pulses are 2+ on the right side, and 2+ on the left side  Posterior tibial pulses are 2+ on the right side, and 2+ on the left side  Feet:   Right Foot:   Skin Integrity: Negative for ulcer, skin breakdown, erythema, warmth, callus or dry skin  Left Foot:   Skin Integrity: Negative for ulcer, skin breakdown, erythema, warmth, callus or dry skin  Skin: She is not diaphoretic  Psychiatric: Her speech is normal  Judgment normal  Her mood appears anxious  Her affect is blunt  She is slowed, withdrawn and actively hallucinating  Thought content is paranoid and delusional  Cognition and memory are impaired  She exhibits a depressed mood  She expresses suicidal ideation  She expresses no homicidal ideation  Patient's shoes and socks removed  Right Foot/Ankle   Right Foot Inspection  Skin Exam: skin normal and skin intact no dry skin, no warmth, no callus, no erythema, no maceration, no abnormal color, no pre-ulcer, no ulcer and no callus                          Toe Exam: ROM and strength within normal limits  Sensory     Proprioception: intact   Monofilament testing: intact  Vascular  Capillary refills: < 3 seconds  The right DP pulse is 2+  The right PT pulse is 2+  Left Foot/Ankle  Left Foot Inspection  Skin Exam: skin normal and skin intactno dry skin, no warmth, no erythema, no maceration, normal color, no pre-ulcer, no ulcer and no callus                         Toe Exam: ROM and strength within normal limits                   Sensory     Proprioception: intact  Monofilament: intact  Vascular  Capillary refills: < 3 seconds  The left DP pulse is 2+  The left PT pulse is 2+  Assign Risk Category:  No deformity present; No loss of protective sensation;  No weak pulses       Risk: 0      Full ROS and exam unable to be performed to due to acuity of situation and need for emergent care  Office Visit on 01/22/2019   Component Date Value Ref Range Status    Sodium 01/22/2019 139  136 - 145 mmol/L Final    Potassium 01/22/2019 4 1  3 5 - 5 3 mmol/L Final    Chloride 01/22/2019 102  100 - 108 mmol/L Final    CO2 01/22/2019 32  21 - 32 mmol/L Final    ANION GAP 01/22/2019 5  4 - 13 mmol/L Final    BUN 01/22/2019 12  5 - 25 mg/dL Final    Creatinine 01/22/2019 0 66  0 60 - 1 30 mg/dL Final    Standardized to IDMS reference method    Glucose, Fasting 01/22/2019 116* 65 - 99 mg/dL Final      Specimen collection should occur prior to Sulfasalazine administration due to the potential for falsely depressed results  Specimen collection should occur prior to Sulfapyridine administration due to the potential for falsely elevated results   Calcium 01/22/2019 9 2  8 3 - 10 1 mg/dL Final    AST 01/22/2019 31  5 - 45 U/L Final      Specimen collection should occur prior to Sulfasalazine administration due to the potential for falsely depressed results   ALT 01/22/2019 63  12 - 78 U/L Final      Specimen collection should occur prior to Sulfasalazine administration due to the potential for falsely depressed results       Alkaline Phosphatase 01/22/2019 61  46 - 116 U/L Final    Total Protein 01/22/2019 6 8  6 4 - 8 2 g/dL Final    Albumin 01/22/2019 3 8  3 5 - 5 0 g/dL Final    Total Bilirubin 01/22/2019 0 70  0 20 - 1 00 mg/dL Final    eGFR 01/22/2019 88  ml/min/1 73sq m Final    WBC 01/22/2019 6 36  4 31 - 10 16 Thousand/uL Final    RBC 01/22/2019 4 58  3 81 - 5 12 Million/uL Final    Hemoglobin 01/22/2019 13 9  11 5 - 15 4 g/dL Final    Hematocrit 01/22/2019 42 3  34 8 - 46 1 % Final    MCV 01/22/2019 92  82 - 98 fL Final    MCH 01/22/2019 30 3  26 8 - 34 3 pg Final    MCHC 01/22/2019 32 9  31 4 - 37 4 g/dL Final    RDW 01/22/2019 13 2  11 6 - 15 1 % Final  MPV 01/22/2019 10 1  8 9 - 12 7 fL Final    Platelets 10/21/6413 236  149 - 390 Thousands/uL Final    nRBC 01/22/2019 0  /100 WBCs Final    Neutrophils Relative 01/22/2019 72  43 - 75 % Final    Immat GRANS % 01/22/2019 0  0 - 2 % Final    Lymphocytes Relative 01/22/2019 18  14 - 44 % Final    Monocytes Relative 01/22/2019 9  4 - 12 % Final    Eosinophils Relative 01/22/2019 1  0 - 6 % Final    Basophils Relative 01/22/2019 0  0 - 1 % Final    Neutrophils Absolute 01/22/2019 4 56  1 85 - 7 62 Thousands/µL Final    Immature Grans Absolute 01/22/2019 0 02  0 00 - 0 20 Thousand/uL Final    Lymphocytes Absolute 01/22/2019 1 12  0 60 - 4 47 Thousands/µL Final    Monocytes Absolute 01/22/2019 0 59  0 17 - 1 22 Thousand/µL Final    Eosinophils Absolute 01/22/2019 0 05  0 00 - 0 61 Thousand/µL Final    Basophils Absolute 01/22/2019 0 02  0 00 - 0 10 Thousands/µL Final    TSH 3RD GENERATON 01/22/2019 1 807  0 358 - 3 740 uIU/mL Final    Using supplements with high doses of biotin 20 to more than 300 times greater than the adequate daily intake for adults of 30 mcg/day as established by the [place] [employer], can cause falsely depress results  Appointment on 01/22/2019   Component Date Value Ref Range Status    Hemoglobin A1C 01/22/2019 6 8* 4 2 - 6 3 % Final    EAG 01/22/2019 148  mg/dl Final    Cholesterol 01/22/2019 205* 50 - 200 mg/dL Final      Cholesterol:       Desirable         <200 mg/dl       Borderline         200-239 mg/dl       High              >239           Triglycerides 01/22/2019 69  <=150 mg/dL Final      Triglyceride:     Normal          <150 mg/dl     Borderline High 150-199 mg/dl     High            200-499 mg/dl        Very High       >499 mg/dl    Specimen collection should occur prior to N-Acetylcysteine or Metamizole administration due to the potential for falsely depressed results      HDL, Direct 01/22/2019 77* 40 - 60 mg/dL Final      HDL Cholesterol:       High    >60 mg/dL       Low     <41 mg/dL  Specimen collection should occur prior to Metamizole administration due to the potential for falsley depressed results   LDL Calculated 01/22/2019 114* 0 - 100 mg/dL Final      LDL Cholesterol:     Optimal           <100 mg/dl     Near Optimal      100-129 mg/dl     Above Optimal       Borderline High 130-159 mg/dl       High            160-189 mg/dl       Very High       >189 mg/dl         This screening LDL is a calculated result  It does not have the accuracy of the Direct Measured LDL in the monitoring of patients with hyperlipidemia and/or statin therapy  Direct Measure LDL (BUF831) must be ordered separately in these patients  Kate Newell MD    Note: Portions of the record may have been created with voice recognition software  Occasional wrong word or "sound a like" substitutions may have occurred due to the inherent limitations of voice recognition software  Read the chart carefully and recognize, using context, where substitutions have occurred

## 2019-01-22 NOTE — ED PROVIDER NOTES
History  Chief Complaint   Patient presents with    Anxiety     pt states is anxious is taking care of  who is on hospice and is not taking care of herself, is not sleeping well is feeling paranoid, depressed        History provided by:  Patient  Anxiety   Presenting symptoms: delusional and paranoid behavior    Patient accompanied by: Daughter-in-law  Degree of incapacity (severity): Moderate  Onset quality:  Gradual  Duration:  3 weeks  Timing:  Constant  Progression:  Worsening  Chronicity:  New  Context comment:  Patient's  is terminal, currently on home hospice, she is his primary caregiver, increasing depression, increasing paranoia, believes agencies are after her, not caring for herself, not showering barely eating or sleeping  Relieved by:  None tried  Worsened by:  Nothing  Ineffective treatments:  None tried  Associated symptoms: anxiety    Associated symptoms: no abdominal pain, no chest pain and no headaches    Associated symptoms comment:  Severe paranoia, anybody who comes t into the hous, she is checking multiple forms of ID, convince that caregivers from hospice or actually causing harmed herself and her       Prior to Admission Medications   Prescriptions Last Dose Informant Patient Reported? Taking?   levothyroxine 25 mcg tablet   No Yes   Sig: Take 1 tablet (25 mcg total) by mouth daily      Facility-Administered Medications: None       No past medical history on file  Past Surgical History:   Procedure Laterality Date    LITHOTRIPSY      Renal    TONSILLECTOMY         Family History   Problem Relation Age of Onset   Darlene Clunes ALS Mother     Other Mother         Gastrointestinal bleeding    Other Father         Gastrointestinal bleeding     I have reviewed and agree with the history as documented      Social History   Substance Use Topics    Smoking status: Former Smoker    Smokeless tobacco: Never Used    Alcohol use Yes      Comment: rare        Review of Systems Constitutional: Negative for activity change, chills, diaphoresis and fever  HENT: Negative for congestion, sinus pressure and sore throat  Eyes: Negative for pain and visual disturbance  Respiratory: Negative for cough, chest tightness, shortness of breath, wheezing and stridor  Cardiovascular: Negative for chest pain and palpitations  Gastrointestinal: Negative for abdominal distention, abdominal pain, constipation, diarrhea, nausea and vomiting  Genitourinary: Negative for dysuria and frequency  Musculoskeletal: Negative for neck pain and neck stiffness  Skin: Negative for rash  Neurological: Negative for dizziness, speech difficulty, light-headedness, numbness and headaches  Psychiatric/Behavioral: Positive for paranoia  The patient is nervous/anxious  Physical Exam  Physical Exam   Constitutional: She is oriented to person, place, and time  She appears well-developed  She appears distressed  Disheveled appearance   HENT:   Head: Normocephalic and atraumatic  Eyes: Pupils are equal, round, and reactive to light  Neck: Normal range of motion  Neck supple  No tracheal deviation present  Cardiovascular: Normal rate, regular rhythm, normal heart sounds and intact distal pulses  No murmur heard  Pulmonary/Chest: Effort normal and breath sounds normal  No stridor  No respiratory distress  Abdominal: Soft  She exhibits no distension  There is no tenderness  There is no rebound and no guarding  Musculoskeletal: Normal range of motion  Neurological: She is alert and oriented to person, place, and time  Skin: Skin is warm and dry  She is not diaphoretic  No erythema  No pallor  Psychiatric: She has a normal mood and affect  Her speech is rapid and/or pressured  Thought content is delusional    Vitals reviewed        Vital Signs  ED Triage Vitals   Temperature Pulse Respirations Blood Pressure SpO2   01/22/19 1603 01/22/19 1603 01/22/19 1603 01/22/19 1603 01/22/19 1603 Tesfaye Rides ) 97 3 °F (36 3 °C) 73 16 149/70 98 %      Temp src Heart Rate Source Patient Position - Orthostatic VS BP Location FiO2 (%)   -- 01/22/19 1851 01/22/19 1851 01/22/19 1851 --    Monitor Sitting Right arm       Pain Score       01/22/19 1603       No Pain           Vitals:    01/22/19 1603 01/22/19 1851   BP: 149/70 (!) 174/83   Pulse: 73 64   Patient Position - Orthostatic VS:  Sitting       Visual Acuity      ED Medications  Medications   ALPRAZolam (XANAX) tablet 0 5 mg (0 5 mg Oral Given 1/22/19 1845)       Diagnostic Studies  Results Reviewed     Procedure Component Value Units Date/Time    Urine Microscopic [[id number]]  (Normal) Collected:  01/22/19 1849    Lab Status:  Final result Specimen:  Urine from Urine, Clean Catch Updated:  01/22/19 1915     RBC, UA 0-5 /hpf      WBC, UA 0-5 /hpf      Epithelial Cells None Seen /hpf      Bacteria, UA Occasional /hpf     ED Urine Macroscopic [[id number]]  (Abnormal) Collected:  01/22/19 1849    Lab Status:  Final result Specimen:  Urine Updated:  01/22/19 1848     Color, UA Yellow     Clarity, UA Clear     pH, UA 7 0     Leukocytes, UA Trace (A)     Nitrite, UA Negative     Protein, UA Negative mg/dl      Glucose, UA Negative mg/dl      Ketones, UA Negative mg/dl      Urobilinogen, UA 0 2 E U /dl      Bilirubin, UA Negative     Blood, UA Negative     Specific Gravity, UA 1 020    Narrative:       CLINITEK RESULT                 No orders to display              Procedures  Procedures       Phone Contacts  ED Phone Contact    ED Course  ED Course as of Jan 22 2138 Tue Jan 22, 2019   1812 Patient signed a 61 51 81                                MDM  Number of Diagnoses or Management Options  Paranoia (psychosis) ([facility] [address] ): new and requires workup     Amount and/or Complexity of Data Reviewed  Clinical lab tests: ordered and reviewed  Decide to obtain previous medical records or to obtain history from someone other than the patient: yes  Obtain history from someone other than the patient: yes  Review and summarize past medical records: yes  Discuss the patient with other providers: yes      CritCare Time    Disposition  Final diagnoses:   Paranoia (psychosis) ([address] )     Time reflects when diagnosis was documented in both MDM as applicable and the Disposition within this note     Time User Action Codes Description Comment    1/22/2019  6:28 PM Debo Pascual Add [F22] Paranoia (psychosis) [facility])       ED Disposition     ED Disposition Condition Comment    Transfer to [place] [address] should be transferred out to [facility] has been medically cleared  MD Documentation      Most Recent Value   Accepting Physician  Dr Bhakti Pena Name, [facility] 6T    (Name & Tel number)  Steven Ramirez by [employer] and Unit #)  Humble Pelletier    Sending MD Dr Springer Comment Name, 8 [facility] 6T    (Name & Tel number)  Steven Ramirez by [employer] and Unit #)  SLETS       Follow-up Information    None         Patient's Medications   Discharge Prescriptions    No medications on file     No discharge procedures on file      ED Provider  Electronically Signed by           Milton Rizvi DO  01/22/19 6774

## 2019-01-23 ENCOUNTER — TELEPHONE (OUTPATIENT)
Dept: FAMILY MEDICINE CLINIC | Facility: CLINIC | Age: 74
End: 2019-01-23

## 2019-01-23 PROBLEM — E46 PROTEIN-CALORIE MALNUTRITION (HCC): Status: ACTIVE | Noted: 2019-01-23

## 2019-01-23 PROBLEM — F32.3 MAJOR DEPRESSION WITH PSYCHOTIC FEATURES (HCC): Status: ACTIVE | Noted: 2019-01-23

## 2019-01-23 PROBLEM — F32.2 SEVERE DEPRESSION (HCC): Status: ACTIVE | Noted: 2019-01-23

## 2019-01-23 LAB
ATRIAL RATE: 63 BPM
FOLATE SERPL-MCNC: 14.6 NG/ML (ref 3.1–17.5)
P AXIS: 97 DEGREES
PR INTERVAL: 112 MS
QRS AXIS: -74 DEGREES
QRSD INTERVAL: 68 MS
QT INTERVAL: 400 MS
QTC INTERVAL: 409 MS
T WAVE AXIS: 70 DEGREES
VENTRICULAR RATE: 63 BPM
VIT B12 SERPL-MCNC: 500 PG/ML (ref 100–900)

## 2019-01-23 PROCEDURE — 99253 IP/OBS CNSLTJ NEW/EST LOW 45: CPT | Performed by: PHYSICIAN ASSISTANT

## 2019-01-23 PROCEDURE — 93010 ELECTROCARDIOGRAM REPORT: CPT | Performed by: INTERNAL MEDICINE

## 2019-01-23 PROCEDURE — 82306 VITAMIN D 25 HYDROXY: CPT | Performed by: PSYCHIATRY & NEUROLOGY

## 2019-01-23 PROCEDURE — 82607 VITAMIN B-12: CPT | Performed by: PSYCHIATRY & NEUROLOGY

## 2019-01-23 PROCEDURE — 80307 DRUG TEST PRSMV CHEM ANLYZR: CPT | Performed by: PSYCHIATRY & NEUROLOGY

## 2019-01-23 PROCEDURE — 99222 1ST HOSP IP/OBS MODERATE 55: CPT | Performed by: PSYCHIATRY & NEUROLOGY

## 2019-01-23 RX ORDER — BENZTROPINE MESYLATE 1 MG/ML
1 INJECTION INTRAMUSCULAR; INTRAVENOUS
Status: DISCONTINUED | OUTPATIENT
Start: 2019-01-23 | End: 2019-01-25 | Stop reason: HOSPADM

## 2019-01-23 RX ORDER — ACETAMINOPHEN 325 MG/1
975 TABLET ORAL EVERY 6 HOURS PRN
Status: DISCONTINUED | OUTPATIENT
Start: 2019-01-23 | End: 2019-01-25 | Stop reason: HOSPADM

## 2019-01-23 RX ORDER — ACETAMINOPHEN 325 MG/1
650 TABLET ORAL EVERY 4 HOURS PRN
Status: DISCONTINUED | OUTPATIENT
Start: 2019-01-23 | End: 2019-01-25 | Stop reason: HOSPADM

## 2019-01-23 RX ORDER — MAGNESIUM HYDROXIDE/ALUMINUM HYDROXICE/SIMETHICONE 120; 1200; 1200 MG/30ML; MG/30ML; MG/30ML
30 SUSPENSION ORAL EVERY 4 HOURS PRN
Status: DISCONTINUED | OUTPATIENT
Start: 2019-01-23 | End: 2019-01-25 | Stop reason: HOSPADM

## 2019-01-23 RX ORDER — RISPERIDONE 0.5 MG/1
1 TABLET, ORALLY DISINTEGRATING ORAL
Status: DISCONTINUED | OUTPATIENT
Start: 2019-01-23 | End: 2019-01-25 | Stop reason: HOSPADM

## 2019-01-23 RX ORDER — HYDROXYZINE HYDROCHLORIDE 25 MG/1
25 TABLET, FILM COATED ORAL EVERY 4 HOURS PRN
Status: DISCONTINUED | OUTPATIENT
Start: 2019-01-23 | End: 2019-01-23

## 2019-01-23 RX ORDER — HALOPERIDOL 5 MG/ML
2.5 INJECTION INTRAMUSCULAR EVERY 4 HOURS PRN
Status: DISCONTINUED | OUTPATIENT
Start: 2019-01-23 | End: 2019-01-25 | Stop reason: HOSPADM

## 2019-01-23 RX ORDER — TRAZODONE HYDROCHLORIDE 50 MG/1
25 TABLET ORAL
Status: DISCONTINUED | OUTPATIENT
Start: 2019-01-23 | End: 2019-01-25 | Stop reason: HOSPADM

## 2019-01-23 RX ORDER — SERTRALINE HYDROCHLORIDE 25 MG/1
25 TABLET, FILM COATED ORAL DAILY
Status: DISCONTINUED | OUTPATIENT
Start: 2019-01-23 | End: 2019-01-25 | Stop reason: HOSPADM

## 2019-01-23 RX ORDER — OLANZAPINE 10 MG/1
5 INJECTION, POWDER, LYOPHILIZED, FOR SOLUTION INTRAMUSCULAR EVERY 6 HOURS PRN
Status: DISCONTINUED | OUTPATIENT
Start: 2019-01-23 | End: 2019-01-25 | Stop reason: HOSPADM

## 2019-01-23 RX ORDER — ACETAMINOPHEN 325 MG/1
650 TABLET ORAL EVERY 6 HOURS PRN
Status: DISCONTINUED | OUTPATIENT
Start: 2019-01-23 | End: 2019-01-25 | Stop reason: HOSPADM

## 2019-01-23 RX ORDER — LORAZEPAM 2 MG/ML
1 INJECTION INTRAMUSCULAR EVERY 4 HOURS PRN
Status: DISCONTINUED | OUTPATIENT
Start: 2019-01-23 | End: 2019-01-23

## 2019-01-23 RX ORDER — HALOPERIDOL 5 MG/ML
5 INJECTION INTRAMUSCULAR EVERY 4 HOURS PRN
Status: DISCONTINUED | OUTPATIENT
Start: 2019-01-23 | End: 2019-01-23

## 2019-01-23 RX ORDER — LEVOTHYROXINE SODIUM 0.03 MG/1
25 TABLET ORAL
Status: DISCONTINUED | OUTPATIENT
Start: 2019-01-23 | End: 2019-01-25 | Stop reason: HOSPADM

## 2019-01-23 RX ORDER — OLANZAPINE 2.5 MG/1
2.5 TABLET ORAL
Status: DISCONTINUED | OUTPATIENT
Start: 2019-01-24 | End: 2019-01-25 | Stop reason: HOSPADM

## 2019-01-23 RX ORDER — TRAZODONE HYDROCHLORIDE 50 MG/1
50 TABLET ORAL
Status: DISCONTINUED | OUTPATIENT
Start: 2019-01-23 | End: 2019-01-23

## 2019-01-23 RX ADMIN — SERTRALINE HYDROCHLORIDE 25 MG: 25 TABLET ORAL at 16:30

## 2019-01-23 RX ADMIN — LEVOTHYROXINE SODIUM 25 MCG: 25 TABLET ORAL at 05:59

## 2019-01-23 NOTE — ASSESSMENT & PLAN NOTE
· Patient initially had presented to her PCP regarding severe depression, admits to not caring for herself  Patient with suicidal ideations, wishing to pass when her  does - currently he is home on hospice care  She is his primary caregiver, her sons are also assisting but she feels extreme guilt being here  · Reportedly having visual and auditory hallucinations, feelings of paranoia  · She is upset as she thought that she would be admitted for maximum of 2-3 days; tearful and upset throughout encounter, concerned that her  will die while she is admitted  Requesting pastoral services, contact information for  placed in chart summary  · Vital signs stable on admission  · CBC, CMP WNL    TSH normal   Lipid panel and hemoglobin A1c elevated but PCP is monitoring for now, no intervention planned  · Will obtain EKG in AM  · Plan per Psychiatry

## 2019-01-23 NOTE — ED NOTES
Pt given food as requested  Pt stated " I feel like the medication you gave me is helping  I am feeling a little better  I was taking care of my  and forgot to take care of myself and it all caught up to me at once  I just loved him so much        Esme Mccoy RN  01/22/19 2009

## 2019-01-23 NOTE — MALNUTRITION/BMI
This medical record reflects one or more clinical indicators suggestive of malnutrition and/or morbid obesity  Malnutrition Findings:   Malnutrition type: Social/enviromental  Degree of Malnutrition: Malnutrition of moderate degree  Malnutrition Characteristics: Muscle loss, Weight loss, Inadequate energy    BMI Findings: Body mass index is 19 78 kg/m²  See Nutrition note dated 01/23/2018 for additional details  Completed nutrition assessment is viewable in the nutrition documentation

## 2019-01-23 NOTE — EMTALA/ACUTE CARE TRANSFER
[address] [address] [address]  Dept: [phone]      EMTALA TRANSFER CONSENT    NAME Jerry Haywood                                         1945                              MRN [id number]    I have been informed of my rights regarding examination, treatment, and transfer   by Dr Nando Blackburn DO    Benefits:      Risks:        Consent for Transfer:  I acknowledge that my medical condition has been evaluated and explained to me by the emergency department physician or other qualified medical person and/or my attending physician, who has recommended that I be transferred to the service of  Accepting Physician: Dr Emil Montero  at [facility] Name, [address] : [address]   The above potential benefits of such transfer, the potential risks associated with such transfer, and the probable risks of not being transferred have been explained to me, and I fully understand them  The doctor has explained that, in my case, the benefits of transfer outweigh the risks  I agree to be transferred  I authorize the performance of emergency medical procedures and treatments upon me in both transit and upon arrival at the receiving facility  Additionally, I authorize the release of any and all medical records to the receiving facility and request they be transported with me, if possible  I understand that the safest mode of transportation during a medical emergency is an ambulance and that the Hospital advocates the use of this mode of transport  Risks of traveling to the receiving facility by car, including absence of medical control, life sustaining equipment, such as oxygen, and medical personnel has been explained to me and I fully understand them  (CHANO CORRECT BOX BELOW)  [  ]  I consent to the stated transfer and to be transported by ambulance/helicopter    [  ]  I consent to the stated transfer, but refuse transportation by ambulance and accept full responsibility for my transportation by car  I understand the risks of non-ambulance transfers and I exonerate the Hospital and its staff from any deterioration in my condition that results from this refusal     X___________________________________________    DATE  19  TIME________  Signature of patient or legally responsible individual signing on patient behalf           RELATIONSHIP TO PATIENT_________________________          Provider Certification    NAME Katerin Rivera                                         1945                              MRN [id number]    A medical screening exam was performed on the above named patient  Based on the examination:    Condition Necessitating Transfer The encounter diagnosis was Paranoia (psychosis) ([facility] [address] )  Patient Condition:      Reason for Transfer:      Transfer Requirements: [address]   · Space available and qualified personnel available for treatment as acknowledged by Arlet Belle   · Agreed to accept transfer and to provide appropriate medical treatment as acknowledged by       Dr Satinder Hilton   · Appropriate medical records of the examination and treatment of the patient are provided at the time of transfer   [address], [address] _______  · Transfer will be performed by qualified personnel from [facility]   and appropriate transfer equipment as required, including the use of necessary and appropriate life support measures      Provider Certification: I have examined the patient and explained the following risks and benefits of being transferred/refusing transfer to the patient/family:         Based on these reasonable risks and benefits to the patient and/or the unborn child(riya), and based upon the information available at the time of the patients examination, I certify that the medical benefits reasonably to be expected from the provision of appropriate medical treatments at another medical facility outweigh the increasing risks, if any, to the individuals medical condition, and in the case of labor to the unborn child, from effecting the transfer      X____________________________________________ DATE 01/22/19        TIME_______      ORIGINAL - SEND TO MEDICAL RECORDS   COPY - SEND WITH PATIENT DURING TRANSFER

## 2019-01-23 NOTE — H&P
[address]  Inpatient Geriatric Psychiatry  Psychiatrists Admission Evaluation      Patient Name: Antione Giles  MRN: [id number]  DOS: 01/23/19     Chief Complaint:  suicidal thoughts    (Source of information: patient, chart review)    HPI: Antione Giles  is a 68 y o   y o  female  with remote h/o treatment for depression/anxiety who presented to the ED as recommended by her PCP after reporting suicidal thoughts in the form of wanting to die with her  who is in hospice care for [address] disease  It seems she has had significant and persistent anxiety for many years but poor functioning was mitigated by 's support and taking an active role in household matters  She has broad catastrophization about what may happen to her if she leaves the house  She does not drive or leave the house much due to this  Avoids socializing or crowded places  She has frequent anxious ruminations  She does have some obsessive hand washing and possibly cleaning behavior  She has significant depressive symptoms including poor sleep, anhedonia, low energy, poor appetite (unknown amount of weight loss but BMI ), poor self care, guilt, worthless  She blames herself for not doing enough to care for her  and that she "messes things up " She presents with paranoia consistent with that reported by family in recent encounter with PCP  Family reported this has also been long standing  She claims she sees bugs that are not there then feels the need to clean more, she doesn't trust hired help, thinking they're doing "bad things" and that things are being moved  On assessment, she states she is concerned staff will give her cups that have been used by other patients  She claims there is something wrong with her "intestines" because of some fragments of feces falling out of her underwear   She has active anxious ruminations and has difficulty staying on topic during today's assessment, being preoccupied with being able to go home, wanting to see her children and that her  is "going to die" while she is in the hospital      Denies access to or possession of firearms  PPHx:    1  Onset/Prior Diagnoses:   - OCD, depression in the past  2  Current and past outpatient psych treatment:                - remote h/o psychotherapy  3  Inpatient hospitalizations:                - none       4  Medication trials in the past (including Family Hx):                - lithium, prozac per chart; ativan (dizziness)   - tried some antidepressants but could not recall names, states she was "sensitive" to medications but unable to remember side effects  5  Suicide attempts:                - denies  6  Substance use:   - denies    PMHx/PSHx: melanoma, hypothyroidism    Medications:   Prior to Admission medications    Medication Sig Start Date End Date Taking? Authorizing Provider   levothyroxine 25 mcg tablet Take 1 tablet (25 mcg total) by mouth daily 1/22/19  Yes Carina Li MD                   Allergies: Allergies   Allergen Reactions    Albuterol     Amoxicillin     Bee Venom     Clindamycin     Flu Virus Vaccine     Lisinopril     Sulfites        Social History:  -Domicile status: stable  -Support system: Lives with  of 40+, is a caregiver for him (on hospice status) with home help through hospice service 3 times a week; 3 adult children all live out of state - one in [place] tries to be involved; has a good relationship with them and no prior marriages   -Education/Employment: college educated, clerical work for [address]: denies   -Legal: denies    Family history: cousin with anxiety    Examination:  Physical exam performed by hospitalist has been reviewed    Vitals:    01/23/19 0752   BP: 165/76   Pulse: 78   Resp: 16   Temp: (!) 97 1 °F (36 2 °C)   SpO2: 100%       Mental Status Exam [Per above +]  Appearance: very thin, disheveled; has thinning hair  Behavior: appears distressed, head in her hands frequently, arms wrapped around herself  Speech: soft; difficulty finishing her sentences  Mood: anxious  Affect: tearful, distressed, dysphoric, intense, labile  Thought process: illogical  Thought content: paranoid delusions elicited; reports passive SI  Perceptual disturbances: possible thought blocking but denies AH  Cognition: oriented to all domains  Unable to recall some remote details but able to provide recent history  Insight: poor  Judgement: poor    Lab/work up:  CBC - wnl  CMP - wnl  TSH - wnl  UA - trace LE  UDS - not done  BAL not done     ASSESSMENT:     Axis I:  - MDD with psychotic features  - GUY  - r/o OCD  Axis II:  - deferred  Axis III:  - melanoma, hypothyroidism  Axis IV:  - Critically ill , long h/o dependence on others, supportive family but at a distance  - Strengths include open to psychotropic treatment, stable home    Plan:   1  Disposition: Patient meets criteria for acute inpatient treatment due to being a danger to self given significant self neglect as well as paranoid delusions  Patient will be discharged when there is an absence of SI and psychosis [id number] with signs of improved self care  2  Legal status: voluntary  3  Psychopharmacologic interventions:  - Start zoloft 25mg po daily for depression, anxiety   - olanzapine 2 5mg po qhs tomorrow for psychosis (will stagger initiation of new medications in case of intolerance)   - trazodone 25mg po qhs prn for insomnia  4  Medical comorbidities: Consult hospitalist for baseline admission assessment and follow up as needed  5  Work-up: check b12, folate, vit D given apparent self neglect and weight loss  6  Other therapies: Individual/group/milieu therapy as appropriate   7  Social issues: Case management to connect with family and arrange follow up  8   Precautions: Routine q15min checks, vitals qshift    Fernando Irwin MD  01/23/19

## 2019-01-23 NOTE — CONSULTS
Consult- Fabiola Arellano 1945, 68 y o  female MRN: [id number]    Unit/Bed#: [facility]ig[facility] Sanchez 360-05 Encounter: [id number]    Primary Care Provider: Alec Damico MD   Date and time admitted to hospital: 1/22/2019 11:54 PM      Inpatient consult for Medical Clearance for [address] patient  Consult performed by: Tin Monreal ordered by: Roberth Overcast        Major depression with psychotic features [facility])   Assessment & Plan    · Patient initially had presented to her PCP regarding severe depression, admits to not caring for herself  Patient with suicidal ideations, wishing to pass when her  does - currently he is home on hospice care  She is his primary caregiver, her sons are also assisting but she feels extreme guilt being here  · Reportedly having visual and auditory hallucinations, feelings of paranoia  · She is upset as she thought that she would be admitted for maximum of 2-3 days; tearful and upset throughout encounter, concerned that her  will die while she is admitted  Requesting pastoral services, contact information for  placed in chart summary  · Vital signs stable on admission  · CBC, CMP WNL  TSH normal   Lipid panel and hemoglobin A1c elevated but PCP is monitoring for now, no intervention planned  · Will obtain EKG in AM  · Plan per Psychiatry     Protein-calorie malnutrition [facility])   Assessment & Plan    Malnutrition Findings:   ·   Patient reports poor PO intake due to lack of appetite; she is extremely depressed and stressed over her 's prognosis on hospice and as his primary caregiver  · Patient is cachectic with evidence of muscle wasting  Unsure of exact weight loss  · Will consult Nutrition  BMI Findings:     ·  BMI noted to be 19 69 kg/m2       Malignant melanoma of scalp ([facility] [address] )   Assessment & Plan    · Patient has 2 large lesions on top of her head; another one is on her left ear    Patient has poor insight in to this diagnosis, unfortunately do not have records of her visit regarding these lesions  · As per PCP note, patient had been told that these lesions are melanoma; she is reportedly scheduled for biopsy, potentially surgical incision, however does seem confused about plan  · PCP will reach out to patient's dermatologist regarding next steps       Acquired hypothyroidism   Assessment & Plan    · TSH normal  · Continue Synthroid 25 mcg     Obsessive compulsive disorder   Assessment & Plan    · Patient is very preoccupied that certain items are in their exact placement  · Very concerned as to where she can put her washcloths etc  and how the bathroom is organized  · Supportive care, plan per Psychiatry     Pure hypercholesterolemia   Assessment & Plan    · Currently being monitored by PCP, does not wish to start statin at this time as per last office note yesterday     Type 2 diabetes mellitus without complication, without long-term current use of insulin ([facility] )   Assessment & Plan    Lab Results   Component Value Date    HGBA1C 6 8 (H) 01/22/2019       No results for input(s): POCGLU in the last 72 hours  Blood Sugar Average: Last 72 hrs:  · Patient was seen by her PCP yesterday; originally had been on lifestyle modifications only  · Per PCP note, will monitor for now given acute psychiatric events  · Diabetic diet         VTE Prophylaxis: Enoxaparin (Lovenox)  / reason for no mechanical VTE prophylaxis Psychiatric unit, ambulate     Recommendations for Discharge:  · Follow-up with PCP and Dermatology on discharge    Counseling / Coordination of Care Time: 45 minutes  Greater than 50% of total time spent on patient counseling and coordination of care  Collaboration of Care: Were Recommendations Directly Discussed with Primary Treatment Team? - No     History of Present Illness:    Flynn Hawkins is a 68 y o  female who is originally admitted to the psychiatry service due to severe depression, psychotic symptoms   We are consulted for medical clearance  Past medical history significant for type 2 diabetes on lifestyle modifications, hypothyroidism, cancers of the skin  Patient had initially presented to her PCP with concerning psychiatric symptoms; suicidal ideation of wishing to die when her  does as he is on hospice  Also reporting some visual and auditory hallucinations, paranoia  Throughout encounter patient is extremely upset, tearful  Reports that she thought she would only be admitted for approximately 2-3 days; is concerned that her  will die while she is admitted  Verbalizes extreme guilt for agreeing to be admitted  She does state that she is unable to take care of herself, and wishes that she was able to take care of her  better as well - does have help from her sons  Is preoccupied that certain items have certain placement in her room and bathroom  Does wish to at least speak to her , or at the very least [facility] pastoral services  Currently denies any physical complaints including chest pain/palpitations, shortness of breath, nausea/vomiting, or abdominal pain  Review of Systems:    Review of Systems   Constitutional: Positive for appetite change  Negative for chills, fatigue, fever and unexpected weight change  HENT: Negative for congestion, sore throat and trouble swallowing  Eyes: Negative for photophobia, pain and visual disturbance  Respiratory: Negative for cough, shortness of breath and wheezing  Cardiovascular: Negative for chest pain, palpitations and leg swelling  Gastrointestinal: Negative for abdominal pain, constipation, diarrhea, nausea and vomiting  Endocrine: Negative for polyuria  Genitourinary: Negative for difficulty urinating, dysuria, flank pain, hematuria and urgency  Musculoskeletal: Negative for back pain, myalgias, neck pain and neck stiffness  Skin: Negative for pallor and rash     Neurological: Negative for dizziness, tremors, syncope, weakness, light-headedness, numbness and headaches  Hematological: Does not bruise/bleed easily  Psychiatric/Behavioral: Positive for dysphoric mood, hallucinations and suicidal ideas  Negative for agitation and confusion  Past Medical and Surgical History:     No past medical history on file  Past Surgical History:   Procedure Laterality Date    LITHOTRIPSY      Renal    TONSILLECTOMY         Meds/Allergies:    all medications and allergies reviewed    Allergies: Allergies   Allergen Reactions    Albuterol     Amoxicillin     Bee Venom     Clindamycin     Flu Virus Vaccine     Lisinopril     Sulfites        Social History:     Marital Status: /Civil Union    Substance Use History:   History   Alcohol Use    Yes     Comment: rare     History   Smoking Status    Former Smoker   Smokeless Tobacco    Never Used     History   Drug Use No       Family History:    Family History   Problem Relation Age of Onset   [place]elle [place] ALS Mother     Other Mother         Gastrointestinal bleeding    Other Father         Gastrointestinal bleeding       Physical Exam:     Vitals:        Physical Exam   Constitutional: She is oriented to person, place, and time  Vital signs are normal  She appears cachectic  Patient appears disheveled and frail  HENT:   Head: Normocephalic  Eyes: Pupils are equal, round, and reactive to light  EOM are normal  No scleral icterus  Neck: Normal range of motion  Cardiovascular: Normal rate, regular rhythm and normal heart sounds  No murmur heard  Pulmonary/Chest: Effort normal and breath sounds normal  No respiratory distress  Abdominal: Soft  Bowel sounds are normal  There is no tenderness  Musculoskeletal: She exhibits no edema  Neurological: She is alert and oriented to person, place, and time  Skin: Skin is warm and dry  Two large lesions on the top of patient's scalp  They are black and encrusted in nature; no visible drainage and appeared nontender  Again discussed for outpatient follow-up regarding biopsy  Psychiatric: Her speech is tangential  Cognition and memory are impaired  She exhibits a depressed mood  Patient tearful throughout encounter  Offered support  Nursing note and vitals reviewed  Additional Data:     Lab Results: I have personally reviewed pertinent reports  Results from last 7 days  Lab Units 01/22/19  1036   WBC Thousand/uL 6 36   HEMOGLOBIN g/dL 13 9   HEMATOCRIT % 42 3   PLATELETS Thousands/uL 236   NEUTROS PCT % 72   LYMPHS PCT % 18   MONOS PCT % 9   EOS PCT % 1       Results from last 7 days  Lab Units 01/22/19  1036   SODIUM mmol/L 139   POTASSIUM mmol/L 4 1   CHLORIDE mmol/L 102   CO2 mmol/L 32   BUN mg/dL 12   CREATININE mg/dL 0 66   ANION GAP mmol/L 5   CALCIUM mg/dL 9 2   ALBUMIN g/dL 3 8   TOTAL BILIRUBIN mg/dL 0 70   ALK PHOS U/L 61   ALT U/L 63   AST U/L 31             Lab Results   Component Value Date/Time    HGBA1C 6 8 (H) 01/22/2019 10:36 AM    HGBA1C 6 1 06/29/2018 08:14 AM    HGBA1C 6 0 01/11/2018 09:29 AM               Imaging: I have personally reviewed pertinent reports  No orders to display       EKG, Pathology, and Other Studies Reviewed on Admission:   · EKG:  Obtain EKG in a m     ** Please Note: This note has been constructed using a voice recognition system   **

## 2019-01-23 NOTE — ED NOTES
Phone call to [facility] at [phone] to complete pre cert  Offices are now closed    Need to be contacted during normal business hours Monday through Friday from 8 am to 6 pm

## 2019-01-23 NOTE — MEDICAL STUDENT
Psychiatric Evaluation - [address] [address]  Mery Lawrence 68 y o  female MRN: [id number]  Unit/Bed#: Fatimah Current 097-33 Encounter: [id number]    Assessment:   Differential includes: Major Depressive Episode with Generalized Anxiety Disorder, r/o OCD, Bipolar disorder, MDD with psychotic features    On interview the patient is very focused on all of the negative things going on and believes that the worst case scenario is going to happen  She talks a lot about how she fears her  will die at home and she wont see him and that she just wants to go home  She does not see why she needs to be here and has no insight into her illness  Her depression is impairing her ability to function and take care of herself  Plan:  SSRI- Zoloft for her depression  Olanzapine- to treat psychotic symptoms of her depression, help with sleep, help with weight gain  CBT to help patient deal with stressors and improve her self-image    Chief Complaint: "Severe depression, not taking care of myself, and wanting to go with my "    History of Present Illness     Patient is a 68 y o  female presents with thoughts of death and inability to care for herself due to her depression  Patient was admitted to psychiatric unit on a voluntarily 201 commitment basis  Primary complaints include: anxiety, anxiety attacks, avoidance of crowds, difficulty sleeping, fearfulness, feeling depressed, poor concentration and tearfulness  Onset of symptoms was gradual starting 2 months ago with gradually worsening course since that time  Psychosocial Stressors: family  Kelly Booth has been feeling very depressed and has not been taking care of herself enough in the last two months since her  was diagnosed with Creutzfeldt-Bimal disease  She feels overwhelmed with his medical care at home even though she has visiting nursing 3 times per week and her son who lives nearby occasionally coming by to help her   She has not been sleeping, eats very otis, has notices weight loss, and has self-reported poor hygiene  She feels nervous to drive or leave the house because she worries that something bad will happen to her when she leaves or something will happen at home when she is not there  She does not like going to the store or crowded places by herself because she feels nervous in these situations and has become suspicious of neighbors and other people and worries what they are doing and what their intentions are  She doesn't feel safe because she thinks someone could try to break in at any time and gets nervous when she sees that things are out of the order she thought she left them in or if she thinks a pill is missing  She feels like her house is very dirty and does not have time to clean it enough, so she sees insects on the floor but states that other people don't always see them so they may or may not actually be there  She also thinks that there is "something wrong with her intestines" and worries that when she walks around she is leaving little pieces of feces that fall out of her underwear although this incontinence has never been noted by others around her  At night she often wakes up panicking and gasping for breath because she worries about all of the things she is not doing  She feels guilty and hopeless that she is not caring enough for her  and feels like she messes everything up  She states that she is worried her sons are angry with her because she was not a good enough caretaker for her   She has been very reliant on her  during the 50+ years that they were   He did most of the driving, handled finances, and went places with her that she felt nervous to help her  Now that he is ill and close to dying she worries constantly about all of these things and doesn't know how to get them done        Psychiatric Review Of Systems:  sleep: yes  appetite changes: yes  weight changes: yes  energy/anergy: yes  interest/pleasure/anhedonia: yes  somatic symptoms: no  anxiety/panic: yes  shalini: no  guilty/hopeless: yes  self injurious behavior/risky behavior: no    Historical Information     Past Psychiatric History:   Patient has never been treated on an inpatient setting  She saw a therapist many years ago for depression  Currently in treatment with none  Past Suicide attempts: none  Past Violent behavior: none  Past Psychiatric medication trial: Many antidepressants in the 1990s, Lithium    Substance Abuse History:  Smoked in college  No alcohol use  No other drug use    Family Psychiatric History:   Patient was adopted and is unsure of most family history, believes cousins had anxiety    Social History:  Education: college graduate  Learning Disabilities: none  Marital history:   Living arrangement, social support: lives at home with  who is on hospice  Occupational History: retired  Functioning Relationships: good support system  Other Pertinent History: None      Traumatic History:   Abuse: denies  Other Traumatic Events: none    No past medical history on file  Medical Review Of Systems:  ROS was negative except for bowel incontinence, depression, insomnia, and anxiety      Meds/Allergies   current meds:   Current Facility-Administered Medications   Medication Dose Route Frequency    acetaminophen (TYLENOL) tablet 650 mg  650 mg Oral Q6H PRN    acetaminophen (TYLENOL) tablet 650 mg  650 mg Oral Q4H PRN    acetaminophen (TYLENOL) tablet 975 mg  975 mg Oral Q6H PRN    aluminum-magnesium hydroxide-simethicone (MYLANTA) 200-200-20 mg/5 mL oral suspension 30 mL  30 mL Oral Q4H PRN    benztropine (COGENTIN) injection 1 mg  1 mg Intramuscular Q1H PRN    enoxaparin (LOVENOX) subcutaneous injection 40 mg  40 mg Subcutaneous Q24H [facility]    haloperidol lactate (HALDOL) injection 5 mg  5 mg Intramuscular Q4H PRN    hydrOXYzine HCL (ATARAX) tablet 25 mg  25 mg Oral Q4H PRN    levothyroxine tablet 25 mcg  25 mcg Oral Early Morning    magnesium hydroxide (MILK OF MAGNESIA) 400 mg/5 mL oral suspension 30 mL  30 mL Oral Daily PRN    OLANZapine (ZyPREXA) IM injection 5 mg  5 mg Intramuscular Q6H PRN    risperiDONE (RisperDAL M-TABS) dispersible tablet 1 mg  1 mg Oral Q3H PRN    traZODone (DESYREL) tablet 50 mg  50 mg Oral HS PRN     Allergies   Allergen Reactions    Albuterol     Amoxicillin     Bee Venom     Clindamycin     Flu Virus Vaccine     Lisinopril     Sulfites        Objective   Vital signs in last 24 hours:  Temp:  [96 8 °F (36 °C)-97 3 °F (36 3 °C)] 97 1 °F (36 2 °C)  HR:  [64-78] 78  Resp:  [15-20] 16  BP: (104-180)/(64-84) 165/76    No intake or output data in the 24 hours ending 01/23/19 1221    Mental Status Evaluation:  Appearance:  age appropriate and disheveled   Behavior:  normal   Speech:  soft   Mood:  anxious and sad   Affect:  constricted   Language: naming objects and repeating phrases   Thought Process:  normal   Thought Content:  normal   Perceptual Disturbances: Possible visual hallucinations and paranoia   Risk Potential: None   Sensorium:  person, place, time/date and situation   Cognition:  grossly intact   Consciousness:  alert and awake    Attention: Unable to focus on conversation, constantly going back to her worry about going home   Intellect: within normal limits   Fund of Knowledge: awareness of current events: fair, past history: fair and vocabulary: fair   Insight:  limited, patient does not understand why she needs to stay here   Judgment: limited   Muscle Strength and Tone: not assessed    Gait/Station: normal gait/station and slow   Motor Activity: no abnormal movements     Lab Results:   Lab Results   Component Value Date    WBC 6 36 01/22/2019    HGB 13 9 01/22/2019    HCT 42 3 01/22/2019    MCV 92 01/22/2019     01/22/2019     Lab Results   Component Value Date    K 4 1 01/22/2019     01/22/2019    CO2 32 01/22/2019    BUN 12 01/22/2019 CREATININE 0 66 01/22/2019    GLUF 116 (H) 01/22/2019    CALCIUM 9 2 01/22/2019    AST 31 01/22/2019    ALT 63 01/22/2019    ALKPHOS 61 01/22/2019    EGFR 88 01/22/2019     Lab Results   Component Value Date    FLF0GSILTVTJ 1 807 01/22/2019     Carson Carlson- Medical Student Note

## 2019-01-23 NOTE — PROGRESS NOTES
Patient had difficulty sleeping, lifted head off pillow during most of the rounds but did not want medication

## 2019-01-23 NOTE — ASSESSMENT & PLAN NOTE
· Patient is very preoccupied that certain items are in their exact placement  · Very concerned as to where she can put her washcloths etc  and how the bathroom is organized  · Supportive care, plan per Psychiatry

## 2019-01-23 NOTE — ED NOTES
Patient's primary insurance is Medicare A & B  No pre cert needed  Secondary insurance is [employer]  Contacted [employer]  [phone]  Prompt stated patient's member ID not on file; however I spoke Jb Trotter and she stated plan is current as of January 1, 2019 with no end date and this is patient's only insurance and pre cert is needed for inpatient Psychiatric Care  She transferred me and I was placed on hold for sometime then disconnected  I attempted to call back and was advised bed needs to be found before can be completed

## 2019-01-23 NOTE — ASSESSMENT & PLAN NOTE
· Patient has 2 large lesions on top of her head; another one is on her left ear  Patient has poor insight in to this diagnosis, unfortunately do not have records of her visit regarding these lesions    · As per PCP note, patient had been told that these lesions are melanoma; she is reportedly scheduled for biopsy, potentially surgical incision, however does seem confused about plan  · PCP will reach out to patient's dermatologist regarding next steps

## 2019-01-23 NOTE — ED NOTES
Pt in room on phone with family  Pt helped with using cell phone        Faisal Marin RN  01/22/19 4056

## 2019-01-23 NOTE — PLAN OF CARE
Problem: Ineffective Coping  Goal: Participates in unit activities  Interventions:  - Provide therapeutic environment   - Provide required programming   - Redirect inappropriate behaviors    Outcome: Progressing  Pt  Initially reluctant to stay in the hospital She demonstrated restlessness and anxiety when in groups and discussed anger at her life that is changing so much at home

## 2019-01-23 NOTE — TELEPHONE ENCOUNTER
Spoke with Dr Yuri Chavez office  They are seeing her again to do biopsies    They did not give her a definitive diagnosis but she does have a follow up appointment with them 02/19/2019 @ 1pm

## 2019-01-23 NOTE — TELEPHONE ENCOUNTER
I saw the patient yesterday and there is some confusion in regards to lesions on her scalp  In the chart she has a diagnosis of "melanoma", but I looked through old notes and I don't see it mentioned anywhere, nor any biopsies  She says she was to have an appt with Derm, Dr Dung Mayes, office for biopsy, but was told she would have to see Dr Lilo Hoffman because she would need it surgically removed under anesthesia? The patient is confused and currently under a psych admit, but can you please call their offices to clarify who she should be seeing and if either of them have a record of melanoma or if it was a presumed diagnosis so once she is discharged, she can f/u appropriately, thanks!

## 2019-01-23 NOTE — ASSESSMENT & PLAN NOTE
Lab Results   Component Value Date    HGBA1C 6 8 (H) 01/22/2019       No results for input(s): POCGLU in the last 72 hours      Blood Sugar Average: Last 72 hrs:  · Patient was seen by her PCP yesterday; originally had been on lifestyle modifications only  · Per PCP note, will monitor for now given acute psychiatric events  · Diabetic diet

## 2019-01-23 NOTE — ASSESSMENT & PLAN NOTE
· Currently being monitored by PCP, does not wish to start statin at this time as per last office note yesterday

## 2019-01-23 NOTE — CASE MANAGEMENT
Met with pt to review Tx plan and initiate discharge planning  Pt was very anxious and somewhat tearful as we reviewed the Tx plan  She frequently stated she has to get home to her   She states he has Lennox Arbour disease which was Dx in November 2018  Hospice comes to the home to help care for him and she is fearful he will die before she gets home  She went on to state that she has 3 sons and her son Yovanny Woods is POA and is currently staying with her  to care for him while she is here at the hospital      Pt signed an ARCHIE for her 3 sons, Sadia Velásquez, and Alexis Blum  I have placed a call to Yovanny Woods and left a message requesting a call back  Pt signed her IMM Rights and Tx plan very hesitantly  She reports there are firearms in the home but it is uncertain if she would have access, stating they are locked up, but she did not respond when I questioned if she would have access  I have notified [facility] of this admit and they report pt has an appointment on 1/25 with a therapist, Andrew Tran,  in their office   Pt gets her prescriptions filled at [facility]

## 2019-01-23 NOTE — ED NOTES
Pt helped to the bathroom with ED tech  Pt repeating saying "I dont want to use that bathroom  How do I clean that bathroom  Together we verbally went over steps and equipment to clean and how to use the bathroom  The HANY tech went in the bathroom cleaned the toilet for her and then she used the bathroom         Manuel Fuentes RN  01/22/19 [address] [facility] Rony Rodriguez  01/22/19 0577

## 2019-01-23 NOTE — PROGRESS NOTES
Pt anxious and guarded but pleasant with no agitation noted  Minimal peer interactions noted  Poor appetite at breakfast   VSS  Pt refused lovenox, D  [facility] notified  Gait steady  Monitored for safety and support

## 2019-01-23 NOTE — ED NOTES
Patient is accepted at [address]  Patient is accepted by Hansa Branham per [address] is arranged with [employer] is scheduled for 11:30 pm    Patient may go to the floor at upon arrival          Nurse report is to be called to [phone] prior to patient transfer

## 2019-01-23 NOTE — ASSESSMENT & PLAN NOTE
Malnutrition Findings:   ·   Patient reports poor PO intake due to lack of appetite; she is extremely depressed and stressed over her 's prognosis on hospice and as his primary caregiver  · Patient is cachectic with evidence of muscle wasting  Unsure of exact weight loss  · Will consult Nutrition         BMI Findings:     ·  BMI noted to be 19 69 kg/m2

## 2019-01-24 LAB
AMPHETAMINES UR QL SCN: NEGATIVE NG/ML
BARBITURATES UR QL SCN: NEGATIVE NG/ML
BENZODIAZ UR QL SCN: NEGATIVE NG/ML
BZE UR QL: NEGATIVE NG/ML
CANNABINOIDS UR QL SCN: NEGATIVE NG/ML
METHADONE UR QL SCN: NEGATIVE NG/ML
OPIATES UR QL: NEGATIVE NG/ML
PCP UR QL: NEGATIVE NG/ML
PROPOXYPH UR QL: NEGATIVE NG/ML

## 2019-01-24 PROCEDURE — 99232 SBSQ HOSP IP/OBS MODERATE 35: CPT | Performed by: PSYCHIATRY & NEUROLOGY

## 2019-01-24 RX ADMIN — SERTRALINE HYDROCHLORIDE 25 MG: 25 TABLET ORAL at 08:35

## 2019-01-24 RX ADMIN — OLANZAPINE 2.5 MG: 2.5 TABLET, FILM COATED ORAL at 21:18

## 2019-01-24 NOTE — PROGRESS NOTES
[address]  Psychiatrists  Progress Note    Patient Name: Jerry Haywood  MRN: [id number]  DOS: 01/24/19     Chief Complaint:  suicidal thoughts, delusions     Interval History: Per staff, patient has been making bizarre statements, that she thinks she is going to get a feeding tube  Patient has delusions of self deprecation  She reports that she said something wrong and was overheard by staff, now she feels everyone hates me  Significant sense of worthlessness noted in her thought content  She reports having slept poorly, believes she received something at bedtime for sleep though MAR report does not indicate she received anything at bedtime yesterday  Patient is agreeable to trial and the pain tonight both for suspiciousness and difficulty sleeping  Overall her affect appears brighter today but delusions or more overt  Medication compliant with new psychiatric medications but refused Synthroid for reasons she did not discuss  Adverse effects of medications:  Reports mild headache behind her left eye but is tolerable  Mental Status Exam [Per above +]  Appearance:  Grooming is improved, hygiene is fair, no abnormal involuntary movements noted  Behavior:  Better engaged, appears to be in less distress, attentive and not guarded  Speech:  Much improved, no latency, able to finish sentences, normal volume  Mood:  Anxious  Affect:  Brighter, reactive, stable  Thought process:  Illogical but linear, relevant  Thought content:  Delusions of self deprecation and guilt, denies suicidal thoughts today  Perceptual disturbances:  Denies auditory and visual hallucinations  Cognition: oriented to all domains   No gross cognitive deficits   Insight:  Poor  Judgement:  Poor      Current Facility-Administered Medications:     acetaminophen (TYLENOL) tablet 650 mg, 650 mg, Oral, Q6H PRN, Mercedes Ivan PA-C    acetaminophen (TYLENOL) tablet 650 mg, 650 mg, Oral, Q4H PRN, Mariama Juarez PA-C    acetaminophen (TYLENOL) tablet 975 mg, 975 mg, Oral, Q6H PRN, Maddie Leal PA-C    aluminum-magnesium hydroxide-simethicone (MYLANTA) 200-200-20 mg/5 mL oral suspension 30 mL, 30 mL, Oral, Q4H PRN, Mariama Juarez PA-C    benztropine (COGENTIN) injection 1 mg, 1 mg, Intramuscular, Q1H PRN, Mariama Juarez PA-C    haloperidol lactate (HALDOL) injection 2 5 mg, 2 5 mg, Intramuscular, Q4H PRN, Laura Khan MD    levothyroxine tablet 25 mcg, 25 mcg, Oral, Early Morning, Mariama Juarez PA-C, 25 mcg at 01/23/19 0559    magnesium hydroxide (MILK OF MAGNESIA) 400 mg/5 mL oral suspension 30 mL, 30 mL, Oral, Daily PRN, Maddie Leal PA-C    OLANZapine (ZyPREXA) IM injection 5 mg, 5 mg, Intramuscular, Q6H PRN, Maddie Leal PA-C    OLANZapine (ZyPREXA) tablet 2 5 mg, 2 5 mg, Oral, HS, Laura Khan MD    risperiDONE (RisperDAL M-TABS) dispersible tablet 1 mg, 1 mg, Oral, Q3H PRN, Mariama Juarez PA-C    sertraline (ZOLOFT) tablet 25 mg, 25 mg, Oral, Daily, Laura Khan MD, 25 mg at 01/24/19 0206    traZODone (DESYREL) tablet 25 mg, 25 mg, Oral, HS PRN, Laura Khan MD    Vitals:    01/24/19 1519   BP: 155/93   Pulse: 75   Resp: 18   Temp: 97 5 °F (36 4 °C)   SpO2: 98%       Lab/work up:  B12 500; folate 14 6, Vit D 34 8    Assessment:     Axis I:  · MDD with psychotic features  - GUY  - r/o OCD  Axis II:  · deferred  Axis III:  - melanoma, hypothyroidism  Axis IV:  · Critically ill , long h/o dependence on others, supportive family but at a distance  · Strengths include open to psychotropic treatment, stable home    Progress:  Mood and affect improved, delusions worse    Plan:   1  Disposition: Patient meets criteria for ongoing acute inpatient treatment due to being a danger to self given significant self neglect as well as paranoid delusions   Patient will be discharged when there is an absence of SI and psychosis [id number] with signs of improved self care  2  Legal status: voluntary  3  Psychopharmacologic interventions:  · Continue zoloft 25mg po daily for depression, anxiety              - start olanzapine 2 5mg po qhs this evening for psychosis              - trazodone 25mg po qhs prn for insomnia  4  Medical comorbidities: Consult hospitalist for baseline admission assessment and follow up as needed  5  Work-up: none  6  Other therapies: Individual/group/milieu therapy as appropriate - encouraged unit activity participation she has been isolating to her room  7  Social issues: Case management to connect with family and arrange follow up  1   Precautions: Routine q15min checks, vitals qshift    Renetta Polo MD  01/24/19

## 2019-01-24 NOTE — PROGRESS NOTES
Patient unable to sleep  Paranoid about someone doing a "cut down" and did not want a tube because she is eating and drinking  Crying and saying she has to get home to   In and out of bathroom several times and spent most of night sitting on side of bed  Slept 3 hours

## 2019-01-24 NOTE — PROGRESS NOTES
Pt was cooperative with care  Pt was medication compliant with Zoloft, as she took this at home, but refused Synthroid  Pt was paranoid over her medications, and was observed pacing her room, looking over her shoulder  Pt was very guarded with information when approached this morning  Pt reported anxiety and depression but denied all other symptoms  Will continue to monitor

## 2019-01-24 NOTE — MEDICAL STUDENT
Progress Note - [address] [address] 68 y o  female MRN: [id number]  Unit/Bed#: [facility] 596-17 Encounter: [id number]    Assessment:  Major Depressive Disorder with Psychotic Features  Patient is doing better but is still paranoid  She has more insight into her situation  She appears to be tolerating the Zoloft well  Plan  Continue Zoloft  Start Zyprexa at night for sleep, appetite, paranoia      Behavior over the last 24 hours:  improved  Sleep: says she had trouble sleeping last night and feels tired  Appetite: improved, but still not eating much  Medication side effects: No  ROS: Complaining only of insomnia and concern that the staff and other patients don't like her    Mental Status Evaluation:  Appearance:  disheveled and older than stated age   Behavior:  normal   Speech:  soft   Mood:  anxious and depressed   Affect:  constricted   Thought Process:  tangential   Thought Content:  delusions  paranoid that people don't like her and she has offended everyone   Perceptual Disturbances: None   Risk Potential: None   Sensorium:  person, place and time/date   Cognition:  grossly intact   Consciousness:  alert and awake    Attention: attention span and concentration were age appropriate   Insight:  fair   Judgment: fair   Gait/Station: normal gait/station   Motor Activity: no abnormal movements     Progress Toward Goals: Patient says she feels better today and wants to become less anxious and able to enjoy time with her family more    Recommended Treatment: Continue with group therapy, milieu therapy and occupational therapy        Labs:   Lab Results   Component Value Date    WBC 6 36 01/22/2019    HGB 13 9 01/22/2019    HCT 42 3 01/22/2019    MCV 92 01/22/2019     01/22/2019     Lab Results   Component Value Date    CALCIUM 9 2 01/22/2019    K 4 1 01/22/2019    CO2 32 01/22/2019     01/22/2019    BUN 12 01/22/2019    CREATININE 0 66 01/22/2019       [address] [facility] Note

## 2019-01-24 NOTE — PROGRESS NOTES
Pt cooperative with care and compliant with medications  Visible in mileu  Has periods of increased anxiety which were relieved minimally with 1:1 interaction/diversional activities  Redirection difficult during these times  Admits to depression and anxiety  Denies SI  Did not attend evening group  Monitoring

## 2019-01-24 NOTE — PLAN OF CARE
Anxiety     Anxiety is at manageable level Progressing        Nutrition/Hydration-ADULT     Nutrient/Hydration intake appropriate for improving, restoring or maintaining nutritional needs Progressing

## 2019-01-24 NOTE — PLAN OF CARE
Problem: Ineffective Coping  Goal: Participates in unit activities  Interventions:  - Provide therapeutic environment   - Provide required programming   - Redirect inappropriate behaviors    Outcome: Progressing  Slightly more participation in groups but does not remain in them  Pt reluctant to speak up in group discussions

## 2019-01-25 ENCOUNTER — TRANSITIONAL CARE MANAGEMENT (OUTPATIENT)
Dept: FAMILY MEDICINE CLINIC | Facility: CLINIC | Age: 74
End: 2019-01-25

## 2019-01-25 ENCOUNTER — TELEPHONE (OUTPATIENT)
Dept: FAMILY MEDICINE CLINIC | Facility: CLINIC | Age: 74
End: 2019-01-25

## 2019-01-25 VITALS
OXYGEN SATURATION: 99 % | WEIGHT: 122.58 LBS | HEIGHT: 66 IN | SYSTOLIC BLOOD PRESSURE: 142 MMHG | RESPIRATION RATE: 16 BRPM | DIASTOLIC BLOOD PRESSURE: 63 MMHG | HEART RATE: 63 BPM | TEMPERATURE: 97.9 F | BODY MASS INDEX: 19.7 KG/M2

## 2019-01-25 PROBLEM — E44.0 MODERATE MALNUTRITION (HCC): Status: ACTIVE | Noted: 2019-01-23

## 2019-01-25 PROCEDURE — 99239 HOSP IP/OBS DSCHRG MGMT >30: CPT | Performed by: PSYCHIATRY & NEUROLOGY

## 2019-01-25 RX ORDER — SERTRALINE HYDROCHLORIDE 25 MG/1
25 TABLET, FILM COATED ORAL DAILY
Qty: 30 TABLET | Refills: 0 | Status: SHIPPED | OUTPATIENT
Start: 2019-01-26 | End: 2019-02-18 | Stop reason: SDUPTHER

## 2019-01-25 RX ORDER — OLANZAPINE 5 MG/1
5 TABLET ORAL
Qty: 30 TABLET | Refills: 0 | Status: SHIPPED | OUTPATIENT
Start: 2019-01-25 | End: 2019-02-13 | Stop reason: SDUPTHER

## 2019-01-25 RX ADMIN — SERTRALINE HYDROCHLORIDE 25 MG: 25 TABLET ORAL at 08:26

## 2019-01-25 RX ADMIN — LEVOTHYROXINE SODIUM 25 MCG: 25 TABLET ORAL at 05:45

## 2019-01-25 NOTE — DISCHARGE INSTRUCTIONS
Sertraline (By mouth)   Sertraline (SER-tra-kade)  Treats depression, obsessive-compulsive disorder (OCD), posttraumatic stress disorder (PTSD), premenstrual dysphoric disorder (PMDD), social anxiety disorder, and panic disorder  This medicine is an SSRI  Brand Name(s): Zoloft   There may be other brand names for this medicine  When This Medicine Should Not Be Used: This medicine is not right for everyone  Do not use it if you had an allergic reaction to sertraline  How to Use This Medicine:   Liquid, Tablet  · Take your medicine as directed  Your dose may need to be changed several times to find what works best for you  You may need to take it for a few weeks or months before you feel better  · Oral liquid: Use the dropper provided to remove the medicine and mix it with 1/2 cup (4 ounces) of water, ginger ale, lemon-lime soda, lemonade, or orange juice  Drink the mixture right away  It is normal for it to look a bit hazy  · This medicine should come with a Medication Guide  Ask your pharmacist for a copy if you do not have one  · Missed dose: Take a dose as soon as you remember  If it is almost time for your next dose, wait until then and take a regular dose  Do not take extra medicine to make up for a missed dose  · Store the medicine in a closed container at room temperature, away from heat, moisture, and direct light  Drugs and Foods to Avoid:   Ask your doctor or pharmacist before using any other medicine, including over-the-counter medicines, vitamins, and herbal products  · Do not use this medicine together with pimozide  Do not use this medicine and an MAO inhibitor (MAOI) within 14 days of each other  Do not use the oral liquid form of sertraline if you are also using disulfiram   · Some medicines can affect how sertraline works   Tell your doctor if you are using the following:   ¨ Buspirone, cimetidine, cisapride, diazepam, digitoxin, fentanyl, flecainide, lithium, phenytoin, propafenone, Meme's wort, tramadol, tryptophan supplements, or valproate  ¨ A blood thinner (such as warfarin), a diuretic (water pill), an NSAID pain or arthritis medicine (such as aspirin, diclofenac, ibuprofen), a tricyclic antidepressant, a triptan medicine for migraine headaches  · Do not drink alcohol while you are using this medicine  Warnings While Using This Medicine:   · Tell your doctor if you are pregnant or breastfeeding, or if you have liver disease, bleeding problems, glaucoma, heart disease, or a seizure disorder  · For some children, teenagers, and young adults, this medicine may increase mental or emotional problems  This may lead to thoughts of suicide and violence  Talk with your doctor right away if you have any thoughts or behavior changes that concern you  Tell your doctor if you or anyone in your family has a history of bipolar disorder or suicide attempts  · This medicine may cause the following problems:   ¨ Serotonin syndrome (when taken with certain medicines)  ¨ Low sodium levels (more common in elderly patients and those who take diuretics or become dehydrated)  · Tell your doctor if you are sensitive to latex, because the oral liquid comes with a latex rubber dropper  · This medicine may make you dizzy or drowsy  Do not drive or do anything that could be dangerous until you know how this medicine affects you  · Do not stop using this medicine suddenly  Your doctor will need to slowly decrease your dose before you stop it completely  · Your doctor will check your progress and the effects of this medicine at regular visits  Keep all appointments  · Keep all medicine out of the reach of children  Never share your medicine with anyone    Possible Side Effects While Using This Medicine:   Call your doctor right away if you notice any of these side effects:  · Allergic reaction: Itching or hives, swelling in your face or hands, swelling or tingling in your mouth or throat, chest tightness, trouble breathing  · Anxiety, restlessness, fast heartbeat, fever, sweating, muscle spasms, twitching, nausea, vomiting, diarrhea, seeing or hearing things that are not there  · Blistering, peeling, or red skin rash  · Confusion, weakness, and muscle twitching  · Eye pain, vision changes, seeing halos around lights  · Feeling more excited or energetic than usual  · Thoughts of hurting yourself or others, unusual behavior  · Unusual bleeding or bruising  If you notice these less serious side effects, talk with your doctor:   · Dry mouth  · Loss of appetite, weight loss  · Mild diarrhea, constipation, nausea, vomiting  · Sexual problems  · Sleepiness, or trouble sleeping  If you notice other side effects that you think are caused by this medicine, tell your doctor  Call your doctor for medical advice about side effects  You may report side effects to FDA at 2-553-FDA-7331  © 2017 [address] [employer] Information is for End User's use only and may not be sold, redistributed or otherwise used for commercial purposes  The above information is an  only  It is not intended as medical advice for individual conditions or treatments  Talk to your doctor, nurse or pharmacist before following any medical regimen to see if it is safe and effective for you  Olanzapine (By mouth)   Olanzapine (wh-TAR-mi-peen)  Treats psychotic mental disorders, such as schizophrenia or bipolar disorder (manic-depressive illness)  Brand Name(s): ZyPREXA, ZyPREXA Zydis   There may be other brand names for this medicine  When This Medicine Should Not Be Used: This medicine is not right for everyone  Do not use it if you had an allergic reaction to olanzapine  How to Use This Medicine:   Tablet, Dissolving Tablet  · Take your medicine as directed  Your dose may need to be changed several times to find what works best for you  · Make sure your hands are dry before you handle the disintegrating tablet   Peel back the foil from the blister pack, then remove the tablet  Do not push the tablet through the foil  Place the tablet in your mouth  After it has melted, swallow or take a drink of water  · This medicine should come with a Medication Guide  Ask your pharmacist for a copy if you do not have one  · Missed dose: Take a dose as soon as you remember  If it is almost time for your next dose, wait until then and take a regular dose  Do not take extra medicine to make up for a missed dose  · Store the medicine in a closed container at room temperature, away from heat, moisture, and direct light  Keep the disintegrating tablet in the original package until you are ready to use it  Drugs and Foods to Avoid:   Ask your doctor or pharmacist before using any other medicine, including over-the-counter medicines, vitamins, and herbal products  · You must be careful if you are also using other medicine that might cause similar side effects as olanzapine  Make sure your doctor knows about all other medicines you are using  · Some medicines can affect how olanzapine works  Tell your doctor if you are using any of the following:  ¨ Carbamazepine, diazepam, fluoxetine, fluvoxamine, levodopa, omeprazole, or rifampin  ¨ Blood pressure medicine  c  · Tell your doctor if you use anything else that makes you sleepy  Some examples are allergy medicine, narcotic pain medicine, and alcohol  · Do not drink alcohol while you are using this medicine  · Tell your doctor if you smoke tobacco  You might need a different amount of this medicine if you smoke  Warnings While Using This Medicine:   · Tell your doctor if you are pregnant, or if you have kidney disease, liver disease, diabetes, glaucoma, prostate problems, or a history of breast cancer, neuroleptic malignant syndrome (NMS), seizures, or severe constipation   Tell your doctor if you have any kind of heart or circulation problems, including low blood pressure, heart failure, heart rhythm problems, or a history of a heart attack or stroke  Tell your doctor if you have a condition called phenylketonuria  · Do not breastfeed while you are using this medicine  · For some children, teenagers, and young adults, this medicine may increase mental or emotional problems  This may lead to thoughts of suicide and violence  Talk with your doctor right away if you have any thoughts or behavior changes that concern you  Tell your doctor if you or anyone in your family has a history of bipolar disorder or suicide attempts  · This medicine may cause the following problems:  ¨ Neuroleptic malignant syndrome (a nerve and muscle problem)  ¨ Drug reaction with eosinophilia and systemic symptoms (DRESS)  ¨ High blood sugar, cholesterol, or triglyceride levels  ¨ Tardive dyskinesia (a muscle problem that may become permanent)  · This medicine may make you dizzy or drowsy, or may cause trouble with thinking or controlling body movements, which may lead to falls, fractures or other injuries  Do not drive or do anything that could be dangerous until you know how this medicine affects you  You may also feel lightheaded when getting up suddenly from a lying or sitting position, so stand up slowly  · This medicine may make you bleed, bruise, or get infections more easily  Take precautions to prevent illness and injury  Wash your hands often  · This medicine may make it more difficult for your body to cool down  Be careful to not become overheated during exercise or hot weather, because you could have heat stroke  · Your doctor will do lab tests at regular visits to check on the effects of this medicine  Keep all appointments  · Keep all medicine out of the reach of children  Never share your medicine with anyone    Possible Side Effects While Using This Medicine:   Call your doctor right away if you notice any of these side effects:  · Allergic reaction: Itching or hives, swelling in your face or hands, swelling or tingling in your mouth or throat, chest tightness, trouble breathing  · Eye pain, trouble seeing  · Feeling very thirsty or hungry, change in how much or how often you urinate  · Fever, chills, cough, sore throat, body aches  · Jerky muscle movement you cannot control (often in your face, tongue, or jaw)  · Lightheadedness, dizziness, fainting  · Seizures or tremors  · Sweating, confusion, uneven heartbeat, muscle stiffness  · Swollen breasts, or liquid discharge from your nipples (men or women)  · Swollen, painful, or tender lymph glands in neck, armpit, or groin  · Trouble breathing or swallowing  · Unusual behavior, thoughts of hurting yourself or others  · Unusual bleeding, bruising, or weakness  If you notice these less serious side effects, talk with your doctor:   · Constipation, upset stomach  · Dry mouth  · Headache, tiredness  · Sleepiness or unusual drowsiness  · Weight gain  If you notice other side effects that you think are caused by this medicine, tell your doctor  Call your doctor for medical advice about side effects  You may report side effects to FDA at 4-325-FDA-0295  © 2017 [address]  Information is for End User's use only and may not be sold, redistributed or otherwise used for commercial purposes  The above information is an  only  It is not intended as medical advice for individual conditions or treatments  Talk to your doctor, nurse or pharmacist before following any medical regimen to see if it is safe and effective for you  Levothyroxine (By mouth)   Levothyroxine (rra-zab-pyjz-ROXANNE-een)  Treats hypothyroidism  Also treats an enlarged thyroid gland and thyroid cancer  Brand Name(s): Levoxyl, Synthroid, Tirosint, Unithroid   There may be other brand names for this medicine  When This Medicine Should Not Be Used: This medicine is not right for everyone   Do not use it if you had an allergic reaction to glycerol, or you recently had a heart attack  How to Use This Medicine:   Capsule, Liquid, Tablet  · Take your medicine as directed  Your dose may need to be changed several times to find what works best for you  You may have to take this medicine for 6 to 8 weeks before your symptoms start to get better  · Read and follow the patient instructions that come with this medicine  Talk to your doctor or pharmacist if you have any questions  · Take this medicine in the morning on an empty stomach  Wait at least 30 to 60 minutes before you eat any food  · Capsule: Swallow whole  Do not cut or crush it  · Oral liquid:   ¨ This medicine may be mixed with water or be given directly into the mouth  ¨ If mixed with water: Squeeze the contents of 1 single unit-dose ampule into a glass or cup containing water  Stir and drink it immediately  Add some water to the glass or cup and drink the water  This will help get all of the medicine out of the glass or cup  Do not mix this medicine with any other liquid except water  Do not store the mixture for later use  ¨ If taken without water: Squeeze the medicine directly into the mouth or into a spoon and swallow it immediately  · Tablet: If this medicine is being given to a baby or a small child, you can crush the tablet and mix it in 1 to 2 teaspoons (5 to 10 milliliters) of water  This mixture can be given by spoon or dropper  Do not mix the tablet with any other liquid except water  Do not store the mixture  If you do not give the medicine right after it is mixed, throw it away  · Missed dose: Take a dose as soon as you remember  If it is almost time for your next dose, wait until then and take a regular dose  Do not take extra medicine to make up for a missed dose  · Store the medicine in a closed container at room temperature, away from heat, moisture, and direct light  Use the oral liquid within 15 days after opening the pouch  Keep the ampules in the pouch until you are ready to use them    Drugs and Foods to Avoid:   Ask your doctor or pharmacist before using any other medicine, including over-the-counter medicines, vitamins, and herbal products  · Some foods and medicines can affect how levothyroxine works  Tell your doctor if you are using any of the following:  ¨ Amiodarone, dexamethasone, digoxin, imatinib, ketamine, phenobarbital, rifampin  ¨ Beta-blocker medicine  ¨ Blood thinner (including heparin, warfarin)  ¨ Insulin or diabetes medicine  ¨ Medicine to treat depression  · If you use antacids, medicine to treat high cholesterol (including cholestyramine, colesevelam, colestipol), orlistat, sevelamer, sucralfate, stomach medicine (including lansoprazole, omeprazole, pantoprazole), or any medicine that contains calcium or iron, take it at least 4 hours before or 4 hours after you take levothyroxine  · Cottonseed meal, dietary fiber, soybean flour (infant formula), or walnuts may decrease the absorption of this medicine  Talk with your doctor if you have questions  · Do not eat grapefruit or drink grapefruit juice while you are using this medicine  Warnings While Using This Medicine:   · Tell your doctor if you are pregnant or breastfeeding, or if you have anemia, blood clotting problems, diabetes, heart disease, osteoporosis, pituitary gland problems, or adrenal gland problems  Tell your doctor if you have recently received radiation therapy with iodine  Do not use this medicine to treat obesity or to lose weight  · Tell any doctor or dentist who treats you that you take this medicine  · Do not stop using this medicine suddenly  Your doctor will need to slowly decrease your dose before you stop it completely  · Your doctor will do lab tests at regular visits to check on the effects of this medicine  Keep all appointments  · Keep all medicine out of the reach of children  Never share your medicine with anyone    Possible Side Effects While Using This Medicine:   Call your doctor right away if you notice any of these side effects:  · Allergic reaction: Itching or hives, swelling in your face or hands, swelling or tingling in your mouth or throat, chest tightness, trouble breathing  · Chest pain that may spread, trouble breathing, unusual sweating, fainting  · Fast, pounding, or uneven heartbeat  · Seizures or tremors  · Severe headache, blurred or double vision, nausea, vomiting (in children)  · Walking with a limp, knee or hip pain (in children)  If you notice these less serious side effects, talk with your doctor:   · Appetite or weight changes  · Changes in your menstrual periods  · Hair loss  · Nervousness, sensitivity to heat, sweating  If you notice other side effects that you think are caused by this medicine, tell your doctor  Call your doctor for medical advice about side effects  You may report side effects to FDA at 7-903-FDA-1983  © 2017 [address] [employer] Information is for End User's use only and may not be sold, redistributed or otherwise used for commercial purposes  The above information is an  only  It is not intended as medical advice for individual conditions or treatments  Talk to your doctor, nurse or pharmacist before following any medical regimen to see if it is safe and effective for you

## 2019-01-25 NOTE — PROGRESS NOTES
[facility] Inpatient Geriatric Psychiatry  Psychiatrists  Progress Note    Patient Name: Constantino Villareal  MRN: [id number]  DOS: 01/25/19     Chief Complaint:  suicidal thoughts    Interval History: Per staff, patient reported having suicidal thoughts overnight  She has been med compliant but suspicious of staff, guarded, stays in her room  Slept well  Patient remains ambivalent about being alive and says she would like God to take her with her  who is currently in a hospice facility and now declining rapidly per family  Remains anxious and worries excessively  Medication compliant  Adverse effects of medications: reports mild headache this morning      Mental Status Exam [Per above +]  Appearance: unkempt hair; fair hygiene; dry oral mucosa  Behavior: calm, cooperative  Speech: somewhat pressured but not difficult to interrupt; no latency  Mood: depressed  Affect: improved, stable  Thought process: linear  Thought content: no overt paranoid at this moment but was reported by staff overnight; SI without a plan  Perceptual disturbances: no appearance of internal preoccupation  Cognition: oriented to all domains     Insight: partially intact  Judgement: limited      Current Facility-Administered Medications:     acetaminophen (TYLENOL) tablet 650 mg, 650 mg, Oral, Q6H PRN, Jalyn Chapa PA-C    acetaminophen (TYLENOL) tablet 650 mg, 650 mg, Oral, Q4H PRN, Jalyn Chapa PA-C    acetaminophen (TYLENOL) tablet 975 mg, 975 mg, Oral, Q6H PRN, Baron Justyn Leal PA-C    aluminum-magnesium hydroxide-simethicone (MYLANTA) 200-200-20 mg/5 mL oral suspension 30 mL, 30 mL, Oral, Q4H PRN, Jalyn Chapa PA-C    benztropine (COGENTIN) injection 1 mg, 1 mg, Intramuscular, Q1H PRN, Jalyn Chapa PA-C    haloperidol lactate (HALDOL) injection 2 5 mg, 2 5 mg, Intramuscular, Q4H PRN, Kuldip Miller MD    levothyroxine tablet 25 mcg, 25 mcg, Oral, Early Morning, oJse Leal PA-C, 25 mcg at 01/25/19 0545    magnesium hydroxide (MILK OF MAGNESIA) 400 mg/5 mL oral suspension 30 mL, 30 mL, Oral, Daily PRN, Jose Leal PA-C    OLANZapine (ZyPREXA) IM injection 5 mg, 5 mg, Intramuscular, Q6H PRN, Julian Angeles PA-C    OLANZapine (ZyPREXA) tablet 2 5 mg, 2 5 mg, Oral, HS, Jyotsna Jim MD, 2 5 mg at 01/24/19 2118    risperiDONE (RisperDAL M-TABS) dispersible tablet 1 mg, 1 mg, Oral, Q3H PRN, Julian Angeles PA-C    sertraline (ZOLOFT) tablet 25 mg, 25 mg, Oral, Daily, Jyotsna Jim MD, 25 mg at 01/25/19 1063    traZODone (DESYREL) tablet 25 mg, 25 mg, Oral, HS PRN, Jyotsna Jim MD    Vitals:    01/25/19 0729   BP: 142/63   Pulse: 63   Resp: 16   Temp: 97 9 °F (36 6 °C)   SpO2: 99%       Lab/work up: none    Assessment:     Axis I:  · MDD with psychotic features  - GUY  - r/o OCD  Axis II:  · deferred  Axis III:  - melanoma, hypothyroidism  Axis IV:  · Critically ill , long h/o dependence on others, supportive family but at a distance  · Strengths include open to psychotropic treatment, stable home    Progress: slight improvement in affect, delusions less overt    Plan:   1  Disposition: Patient meets criteria for ongoing acute inpatient treatment due to being unable to care for herself and having SI - she is at increased risk due to impending death of her , her age and social isolation  Patient will be discharged when there is an absence of SI and psychosis [id number]  1  Legal status: voluntary  2  Psychopharmacologic interventions:  · Continue zoloft 25mg po daily for depression, anxiety              - increase olanzapine to 5mg po qhs this evening for psychosis              - trazodone 25mg po qhs prn for insomnia  4  Medical comorbidities: Consult hospitalist for baseline admission assessment and follow up as needed  5  Work-up: none  6   Other therapies: Individual/group/milieu therapy as appropriate - encouraged unit activity participation she has been isolating to her room  7  Social issues: Case management to connect with family and arrange follow up  2   Precautions: Routine q15min checks, vitals maryan Irwin MD  01/25/19

## 2019-01-25 NOTE — PROGRESS NOTES
Patient states she is depressed and "scared"  Patient denies SI but states, "I wouldn't care much about living if I didn't have him"  Patient is tearful at one point in conversation, focused on grief and stress  Patient is calm and cooperative  She is well oriented and cooperative

## 2019-01-25 NOTE — TELEPHONE ENCOUNTER
Patient care coordinator from [facility] called to schedule TCM for patient  D/C today  Admitted 1/22/19 for depression & D/C 1/25/19  Scheduled with Dr Roberta Santana 1/30/19 10:30      Contact patient's son Cayla Thomson with any questions [phone] or  [phone]

## 2019-01-25 NOTE — NURSING NOTE
Pt ambulate from the unit, accompanied by 3 sons  AVS and other instructions reviewed with pt and sons  Pt and family verbalized understanding  Pt's belongings returned, prescriptions sent to pharmacy

## 2019-01-25 NOTE — PROGRESS NOTES
Family meeting held with patient's 3 sons due to complexity of her social situation and discharge  Staff present were  Maricruz Woods, attending psychiatrist, and geriatric medicine fellow Dr Nelida Acevedo  Plan for patient initially was to stay an additional 2-3 days further due to residual paranoia and ambivalence about living, to further titrate medications, and to ensure gains made in affect, behavior, mood, and paranoia were sustained  Unfortunately, family informed us that her  had declined rapidly and patient is risking losing her chance to say goodbye to him should she continue to stay for inpatient treatment  Patient strongly advocated for her discharge so she can see her  in his last days  We explored various alternatives  Family asked if she can go on leave and return after seeing him for a few hours but hospital administration could not confirm this would be acceptable  We also explored whether seeing her  in a newly deteriorated state may cause her mental condition to worsen but it was decided that depriving her of the opportunity to see him would be cruel  In the unlikely case that she does deteriorate, her children are available to get her to the emergency room  We considered the likelihood of risk to self or others  In regards to her risk to self harm, family believes "she can be very dramatic" when we discussed her reporting wanting god to take her  In regards to her risk to others, family states she does not like to leave the house, that she is probably going to stay home and clean, read, etc and that she has lived with this paranoid state for many years and has no h/o aggression or poor judgement that put others in harms way   They also reported that she has demonstrated independence and capability that far exceeded the level of functioning she herself described being able to perform on several occasions in the past      Patient, family and treatment team decided discharge would be in the best interest of the patient at this time  Family was informed to call the unit with any questions  DL/Bx

## 2019-01-25 NOTE — PROGRESS NOTES
Clinical Pharmacy Note: Antipsychotic Monitoring Requirements     Louis Lindsay is a 68 y o  female who presents with suicidal thoughts and is currently taking  olanzapine 2 5 mg once daily  Performing metabolic monitoring on patients receiving any antipsychotics is a Centers for [employer] (CMS) requirement  Antipsychotic treatment increases the risk of developing type 2 diabetes mellitus, hypertension, and hyperlipidemia  According to the [facility] [address] (NICE) guidelines, baseline weight, waist circumference, pulse, blood pressure, fasting blood glucose, hemoglobin A1c, lipid profile, and prolactin level (if initiating risperidone or paliperidone) should be collected  These guidelines coincide with Centers and Medicare & Medicaid Services (CMS) requirements including baseline Body Mass Index, blood pressure, fasting glucose, hemoglobin A1c, and fasting lipid panel  CMS states laboratory values collected 12 months from discharge date are acceptable for evaluation  CMS Checklist:     BMI: yes  BP: yes  Fasting glucose: yes  A1c within last 12 months: yes  Lipid panel within last 12 months: yes  Nicotine Replacement Therapy: no, patient does not smoke    The following values have been collected:  Value Status Result    BMI Body mass index is 19 78 kg/m²     Blood pressure /63 (BP Location: Right arm)   Pulse 63   Temp 97 9 °F (36 6 °C) (Tympanic)   Resp 16   Ht 5' 6" (1 676 m)   Wt 55 6 kg (122 lb 9 2 oz)   SpO2 99%   BMI 19 78 kg/m²    Fasting glucose   0  Lab Value Date/Time   GLUC 104 10/18/2016 0839      Hemoglobin A1c   0  Lab Value Date/Time   HGBA1C 6 8 (H) 01/22/2019 1036      Lipid panel   0  Lab Value Date/Time   CHOLESTEROL 205 (H) 01/22/2019 1036       0  Lab Value Date/Time   HDL 77 (H) 01/22/2019 1036       0  Lab Value Date/Time   LDLCALC 114 (H) 01/22/2019 1036        0  Lab Value Date/Time   TRIG 69 01/22/2019 1036        ASCVD risk score: 27 8%  History of ACS, MI, stable angina, stroke, TIA, PAD, coronary/aterial revascularization:  no  LDL cholesterol =>190 mg/dL: no  Age: 68 y o  Diabetes: yes  Sex: female  Race: other  HDL Cholesterol: 77  Systolic Blood Pressure: 474  Treatment for Hypertension: no  Smoker: no  (score reflects the risk of cardiovascular events such as stroke or myocardial infarction in the next 10 years)    Recommendations    Based on the results of hemoglobin A1c, lipid panel, and blood pressure monitoring, Chip Banuelos is an potential candidate for antihypertensive medications, anti-diabetic medications and statin therapy based on  blood pressure above goal, HbA1c =>6 5% and ASCVD risk score =>7 5%  Based on the above information, pharmacy recommends the following: Consider close medical follow-up, patient may be appropriate for statin, anti-hypertensive, and anti-diabetic medications  May be addressed in outpatient setting  Continue yearly metabolic monitoring        Pharmacy will continue to follow patient with team   Electronically signed by: Fanny Hobbs, Pharmacist

## 2019-01-25 NOTE — PLAN OF CARE
Problem: Ineffective Coping  Goal: Participates in unit activities  Interventions:  - Provide therapeutic environment   - Provide required programming   - Redirect inappropriate behaviors    Outcome: Progressing  Pt  More receptive to attending one of two groups this morning yet needed staff prompting to get out of bed  Pt continues to display some anxiety even during task participation in structured groups

## 2019-01-25 NOTE — CASE MANAGEMENT
Left message x2 for pt's son, Tere Whaley asking for a call back  I have been informed that he will be coming to visit at 1:00 today per Dr Ham Nicely  I will make every effort to meet with this family at that time

## 2019-01-25 NOTE — PROGRESS NOTES
Patient spent most of evening sitting in day room  Visited with sons and visit went well  Patient visible less anxious, talking calmly with sons, makes good eye contact, conversation appropriate and smiling at staff  Interacted with select peers  No crying or tearfulness noted

## 2019-01-25 NOTE — PLAN OF CARE
Anxiety     Anxiety is at manageable level Progressing        DEPRESSION     Will be euthymic at discharge Progressing        Nutrition/Hydration-ADULT     Nutrient/Hydration intake appropriate for improving, restoring or maintaining nutritional needs Progressing        SELF HARM/SUICIDALITY     Will have no self-injury during hospital stay Progressing        Patient is depressed, anxious and reports passive SI  But has demonstrated and reported improvements in symptoms

## 2019-01-25 NOTE — PLAN OF CARE
Anxiety     Anxiety is at manageable level Adequate for Discharge        DEPRESSION     Will be euthymic at discharge Adequate for Discharge        Nutrition/Hydration-ADULT     Nutrient/Hydration intake appropriate for improving, restoring or maintaining nutritional needs Adequate for Discharge        SELF HARM/SUICIDALITY     Will have no self-injury during hospital stay Adequate for Discharge

## 2019-01-25 NOTE — DISCHARGE SUMMARY
[facility]  Inpatient Geriatric Psychiatry  Psychiatrists Discharge Summary      Patient Name: April Garrett  MRN: [id number]  DOS: 01/25/19     Date of Admission: 1/22/2019  Date of Discharge: 1/25/2019    Principal Discharge Diagnosis: Major Depressive d/o with psychotic features    Other diagnoses:     Patient Active Problem List   Diagnosis    Type 2 diabetes mellitus without complication, without long-term current use of insulin [facility]    Pure hypercholesterolemia    Fatty infiltration of liver    Essential hypertension    Obsessive compulsive disorder    Vitamin D deficiency    Osteoporosis    Acquired hypothyroidism    Malignant melanoma of scalp [facility]    Major depression with psychotic features ([facility] )    Moderate malnutrition ([facility] )       Home Medications Reconciled on Admission:  Prior to Admission medications    Medication Sig Start Date End Date Taking? Authorizing Provider   levothyroxine 25 mcg tablet Take 1 tablet (25 mcg total) by mouth daily 1/22/19  Yes Mk Doty MD       Discharge Medications:   Olanzapine 5mg po qhs #30 no refills  Sertraline 25mg po qAM #30 no refills  Continue levothyroxine as is    REASON FOR ADMISSION    Per admission h&p:    April Garrett  is a 68 y o   y o  female  with remote h/o treatment for depression/anxiety who presented to the ED as recommended by her PCP after reporting suicidal thoughts in the form of wanting to die with her  who is in hospice care for [address] disease  It seems she has had significant and persistent anxiety for many years but poor functioning was mitigated by 's support and taking an active role in household matters  She has broad catastrophization about what may happen to her if she leaves the house  She does not drive or leave the house much due to this  Avoids socializing or crowded places  She has frequent anxious ruminations   She does have some obsessive hand washing and possibly cleaning behavior  She has significant depressive symptoms including poor sleep, anhedonia, low energy, poor appetite (unknown amount of weight loss but BMI ), poor self care, guilt, worthless  She blames herself for not doing enough to care for her  and that she "messes things up " She presents with paranoia consistent with that reported by family in recent encounter with PCP  Family reported this has also been long standing  She claims she sees bugs that are not there then feels the need to clean more, she doesn't trust hired help, thinking they're doing "bad things" and that things are being moved  On assessment, she states she is concerned staff will give her cups that have been used by other patients  She claims there is something wrong with her "intestines" because of some fragments of feces falling out of her underwear  She has active anxious ruminations and has difficulty staying on topic during today's assessment, being preoccupied with being able to go home, wanting to see her children and that her  is "going to die" while she is in the hospital       Denies access to or possession of firearms       PPHx:    1  Onset/Prior Diagnoses:              - OCD, depression in the past  2  Current and past outpatient psych treatment:                - remote h/o psychotherapy  3  Inpatient hospitalizations:                - none       4  Medication trials in the past (including Family Hx):                - lithium, prozac per chart; ativan (dizziness)              - tried some antidepressants but could not recall names, states she was "sensitive" to medications but unable to remember side effects  5  Suicide attempts:                - denies  6  Substance use:              - denies      HOSPITAL COURSE    1  Treatment and response: Patient was started on sertraline 25mg po daily for depression, anxiety  Olanzapine was added due to patient presented as paranoid  Tolerated medications well for the most part  She stated she had some pain behind her eye but that this has occurred as a part of migraines in the past  Patient's affect and mood improved rapidly - possibly due to being able to sleep and to be away from her caretaker role at home  Family reported that patient had little to no sleep for the past 2-3 months  Psychosis was less overt - also improved quickly  Due to her grief about her  and her long time dependence on him, her passive suicidal thoughts about wanting god to take her with her  were still present on discharge but she denied having any plan to harm herself  Is patient being discharged on more than 1 antipsychotic? no    2  Behavior/diagnostic clarification: compliant with all care though guarded and stayed in her room    3  Medical comorbidities: no acute issues noted  She was eating adequately while admitted    4  Case management: Referral was provided to home visiting nurse service on an emergent basis  She is scheduled to follow up with her therapist at her PCP office - she reportedly has a good rapport with the therapist  Therapist stated she would arrange psychiatric follow up as patient was weary of going to see a new provider in psychiatry  Family meeting was held with team and patient's 3 sons to discuss treatment, diagnosis, prognosis as well as risks and benefits of discharge plan  Patient is being discharged as she has had some improvement and due to her  deteriorating rapidly and she was wanting to see him before he passes  He was admitted to a hospice facility soon after her admission to the unit and deteriorated quickly  On discussion with family and treatment team, it was decided that it would be in the patient's best interest to discharge her and allow her to say her goodbyes  Family will be with her around the clock for the next 5 days at least and after that will be able to check in on her on a daily basis  They were informed to have her return as soon as possible to the nearest emergency room should she start to deteriorate  MSE on Discharge:  Appearance: hair unkempt but has good hygiene  Behavior: calm, cooperative  Speech: wnl  Mood: appropriately sad about her   Affect: somewhat tearful, appropriate, stable  Thought process: linear  Thought content: she states she would not be sad if she God were to take her away but that she has no desire to harm herself  Perceptual disturbances: no internal preoccupation noted; denies AH/VH  Cognition:  oriented to all domains  Insight: partially intact  Judgement: adequate      Discharge plan/instructions: Patient is being discharged to home under care of her sons     Follow up with: therapist, visiting nurse service and PCP    See after visit summary for additional details of outpatient follow up arrangements  MOST RECENT LABS AND OTHER WORKUP PERFORMED DURING THIS ADMISSION:    Bloodwork    Lab Results   Component Value Date    WBC 6 36 01/22/2019    HGB 13 9 01/22/2019    HCT 42 3 01/22/2019    MCV 92 01/22/2019     01/22/2019       Lab Results   Component Value Date    K 4 1 01/22/2019     01/22/2019    CO2 32 01/22/2019    BUN 12 01/22/2019    CREATININE 0 66 01/22/2019    GLUF 116 (H) 01/22/2019    CALCIUM 9 2 01/22/2019    AST 31 01/22/2019    ALT 63 01/22/2019    ALKPHOS 61 01/22/2019    EGFR 88 01/22/2019       Pain Management Panel     Pain Management Panel Latest Ref Rng & Units 1/23/2019 1/22/2019    CREATININE UR mg/dL - <13 0    AMPHETAMINE SCRN UR Cczxhr=4333 ng/mL Negative -    BARBITURATE SCRN UR Phjsmz=822 ng/mL Negative -    BENZODIAZEPINE SCREEN, URINE Wyrwdw=151 ng/mL Negative -    METHADONE SCREEN, URINE Ajggtz=795 ng/mL Negative -          Lab Results   Component Value Date    [id number] 1 807 01/22/2019       50 minutes spent on discharge       Marien Dancer, MD  01/25/19

## 2019-01-26 LAB
25(OH)D2 SERPL-MCNC: <1 NG/ML
25(OH)D3 SERPL-MCNC: 29 NG/ML
25(OH)D3+25(OH)D2 SERPL-MCNC: 29 NG/ML

## 2019-01-30 ENCOUNTER — OFFICE VISIT (OUTPATIENT)
Dept: FAMILY MEDICINE CLINIC | Facility: CLINIC | Age: 74
End: 2019-01-30
Payer: COMMERCIAL

## 2019-01-30 VITALS
BODY MASS INDEX: 18.96 KG/M2 | DIASTOLIC BLOOD PRESSURE: 56 MMHG | WEIGHT: 118 LBS | HEART RATE: 64 BPM | SYSTOLIC BLOOD PRESSURE: 90 MMHG | HEIGHT: 66 IN | RESPIRATION RATE: 17 BRPM | TEMPERATURE: 97.8 F

## 2019-01-30 DIAGNOSIS — E03.9 ACQUIRED HYPOTHYROIDISM: ICD-10-CM

## 2019-01-30 DIAGNOSIS — E55.9 VITAMIN D DEFICIENCY: ICD-10-CM

## 2019-01-30 DIAGNOSIS — E11.9 TYPE 2 DIABETES MELLITUS WITHOUT COMPLICATION, WITHOUT LONG-TERM CURRENT USE OF INSULIN (HCC): ICD-10-CM

## 2019-01-30 DIAGNOSIS — L98.9 SCALP LESION: ICD-10-CM

## 2019-01-30 DIAGNOSIS — F42.2 MIXED OBSESSIONAL THOUGHTS AND ACTS: ICD-10-CM

## 2019-01-30 DIAGNOSIS — E44.0 MODERATE MALNUTRITION (HCC): ICD-10-CM

## 2019-01-30 DIAGNOSIS — F32.3 MAJOR DEPRESSION WITH PSYCHOTIC FEATURES (HCC): Primary | ICD-10-CM

## 2019-01-30 PROCEDURE — 1111F DSCHRG MED/CURRENT MED MERGE: CPT | Performed by: FAMILY MEDICINE

## 2019-01-30 PROCEDURE — 99496 TRANSJ CARE MGMT HIGH F2F 7D: CPT | Performed by: FAMILY MEDICINE

## 2019-01-30 NOTE — PROGRESS NOTES
Transition of Care Management Visit  Ning Taveras 68 y o  female   MRN: [id number]    Assessment/Plan: Ning Taveras is a 68 y o  female with: Major depression with psychotic features ([address] )  Significant MDD and anxiety issues  Possible history of Bipolar disorder, states she was on Lithium and Dilantin >10 years ago  She has some SI linked to her  being in hospice care  She was discharged on Zyprexa 5mg qHS and Sertraline 25mg  Given her history, will have her continue with psychotherapy and have her establish with Psych ASAP    Vitamin D deficiency  Rechecked and level is 29, advised to start Vit D 1000 IU OTC    Scalp lesion  Confirmed with patient and son that she does NOT already have a diagnosis of malignant melanoma as she had told me at our previous visit  Our office confirmed with Dr Flaca Sullivan that she has not yet had a biopsy and is scheduled for one on 2/19    Acquired hypothyroidism  Lab Results   Component Value Date    [id number] 1 807 01/22/2019     Well controlled on Synthroid 25mcg      Type 2 diabetes mellitus without complication, without long-term current use of insulin (HCC)  Lab Results   Component Value Date    HGBA1C 6 8 (H) 01/22/2019     Given age and other new medications, will not start her on any medications  One of her obsessions as part of her psychiatric illness is checking her BG multiple times/day, since this is causing her more stress and is no longer indicated since she is diet controlled, advised that she does not have to continue to check her glucose levels      Subjective:  Ning Taveras is a 68 y o  female here for Transition Care Management Visit  Pt is here with her son, Constantino Stanley  She states she is feeling better than before, but is concerned about the side effects of her new psych meds  She was sent by me to the ER to be evaluated by Crisis and was hospitalized for 3 days, was discharged early because of concerns of her  being placed in hospice  She was stablized and diagnosed with anxiety, OCD, MDD with psychosis and advised to f/u with therapist and psych  In the past she had been on lithium, dilantin and others she can't recall  She has a history of migraine headaches and has katharina-oral numbness that is new  Her anxiety level is still pretty high  She is concerned mostly about the lesions on her head  Her , René Ramsey, is actively dying of CJD and had to be placed in hospice  Hospital Summary:  Date of Admission: 1/22/2019  Date of Discharge: 1/25/2019    HOSPITAL COURSE     1  Treatment and response: Patient was started on sertraline 25mg po daily for depression, anxiety  Olanzapine was added due to patient presented as paranoid  Tolerated medications well for the most part  She stated she had some pain behind her eye but that this has occurred as a part of migraines in the past  Patient's affect and mood improved rapidly - possibly due to being able to sleep and to be away from her caretaker role at home  Family reported that patient had little to no sleep for the past 2-3 months  Psychosis was less overt - also improved quickly  Due to her grief about her  and her long time dependence on him, her passive suicidal thoughts about wanting god to take her with her  were still present on discharge but she denied having any plan to harm herself      Is patient being discharged on more than 1 antipsychotic? no     2  Behavior/diagnostic clarification: compliant with all care though guarded and stayed in her room     3  Medical comorbidities: no acute issues noted  She was eating adequately while admitted     4  Case management: Referral was provided to home visiting nurse service on an emergent basis   She is scheduled to follow up with her therapist at her PCP office - she reportedly has a good rapport with the therapist  Therapist stated she would arrange psychiatric follow up as patient was weary of going to see a new provider in psychiatry  Family meeting was held with team and patient's 3 sons to discuss treatment, diagnosis, prognosis as well as risks and benefits of discharge plan        Patient is being discharged as she has had some improvement and due to her  deteriorating rapidly and she was wanting to see him before he passes  He was admitted to a hospice facility soon after her admission to the unit and deteriorated quickly  On discussion with family and treatment team, it was decided that it would be in the patient's best interest to discharge her and allow her to say her goodbyes  Family will be with her around the clock for the next 5 days at least and after that will be able to check in on her on a daily basis  They were informed to have her return as soon as possible to the nearest emergency room should she start to deteriorate  Need for Follow-up: Needs to establish with Psych    Changed/New Medications: New- Zyprexa 5mg qHS and Sertraline 25mg    I personally reviewed the following images: None available for review      Review of Systems   Constitutional: Positive for fatigue  Eyes: Negative for visual disturbance  Cardiovascular: Negative for chest pain  Neurological: Positive for headaches  Psychiatric/Behavioral: Positive for agitation, behavioral problems, decreased concentration, sleep disturbance and suicidal ideas  The patient is nervous/anxious and is hyperactive        Patient Active Problem List    Diagnosis Date Noted    Major depression with psychotic features ([facility] ) 01/23/2019    Moderate malnutrition ([address] ) 01/23/2019    Acquired hypothyroidism 01/22/2019    Scalp lesion 01/22/2019    Vitamin D deficiency 07/10/2018    Type 2 diabetes mellitus without complication, without long-term current use of insulin ([facility] ) 11/21/2013    Pure hypercholesterolemia 11/18/2013    Fatty infiltration of liver 11/18/2013    Mixed obsessional thoughts and acts 11/18/2013    Osteoporosis 11/18/2013 Current Outpatient Prescriptions:     levothyroxine 25 mcg tablet, Take 1 tablet (25 mcg total) by mouth daily, Disp: 90 tablet, Rfl: 1    OLANZapine (ZyPREXA) 5 mg tablet, Take 1 tablet (5 mg total) by mouth daily at bedtime, Disp: 30 tablet, Rfl: 0    sertraline (ZOLOFT) 25 mg tablet, Take 1 tablet (25 mg total) by mouth daily, Disp: 30 tablet, Rfl: 0    Objective:  BP 90/56 (BP Location: Left arm, Patient Position: Sitting, Cuff Size: Standard)   Pulse 64   Temp 97 8 °F (36 6 °C)   Resp 17   Ht 5' 6" (1 676 m)   Wt 53 5 kg (118 lb)   Breastfeeding? No   BMI 19 05 kg/m²   Physical Exam   Constitutional: She appears well-developed and well-nourished  No distress  Skin: She is not diaphoretic  Psychiatric: Her speech is normal and behavior is normal  Judgment normal  Her mood appears anxious  Her affect is blunt  She is not agitated and not aggressive  Thought content is paranoid  Cognition and memory are normal  She expresses no homicidal and no suicidal ideation  She expresses no suicidal plans and no homicidal plans  Transitional Care Management Review:  Constantino Villareal is a 68 y o  female here for TCM follow up  During the TCM phone call patient stated:  TCM Call (since 12/30/2018)     Date and time call was made  1/25/2019  5:14 PM    Hospital care reviewed  Records reviewed    Patient was hospitialized at  [facility]    Date of Admission  01/22/19    Date of discharge  01/25/19    Diagnosis  Major Depressive Disorder with Psychotic Features    Disposition  Home    Were the patients medications reviewed and updated  Yes    Current Symptoms  None      TCM Call (since 12/30/2018)     Post hospital issues  None    Should patient be enrolled in anticoag monitoring? No    Scheduled for follow up?   Yes    Did you obtain your prescribed medications  Yes    Do you need help managing your prescriptions or medications  Yes    Why type of assitance do you need  Medication adherence    Is transportation to your appointment needed  Yes    Specify why  Patient not currently driving    I have advised the patient to call PCP with any new or worsening symptoms  [address] members; Spouse or Significiant other    Support System  Spouse; Family    The type of support provided  Emotional; Physical    Do you have social support  Yes, as much as I need    Are you recieving any outpatient services  No    Are you recieving home care services  No    Are you using any community resources  No    Current waiver services  No    Have you fallen in the last 12 months  No    Interperter language line needed  No    Counseling  Family          Julisa Swift MD    Note: Portions of the record may have been created with voice recognition software  Occasional wrong word or "sound a like" substitutions may have occurred due to the inherent limitations of voice recognition software  Read the chart carefully and recognize, using context, where substitutions have occurred

## 2019-01-30 NOTE — ASSESSMENT & PLAN NOTE
Significant MDD and anxiety issues  Possible history of Bipolar disorder, states she was on Lithium and Dilantin >10 years ago  She has some SI linked to her  being in hospice care  She was discharged on Zyprexa 5mg qHS and Sertraline 25mg    Given her history, will have her continue with psychotherapy and have her establish with Psych ASAP

## 2019-01-30 NOTE — ASSESSMENT & PLAN NOTE
Lab Results   Component Value Date    [id number] 1 807 01/22/2019     Well controlled on Synthroid 25mcg

## 2019-01-30 NOTE — ASSESSMENT & PLAN NOTE
Lab Results   Component Value Date    HGBA1C 6 8 (H) 01/22/2019     Given age and other new medications, will not start her on any medications    One of her obsessions as part of her psychiatric illness is checking her BG multiple times/day, since this is causing her more stress and is no longer indicated since she is diet controlled, advised that she does not have to continue to check her glucose levels

## 2019-01-30 NOTE — ASSESSMENT & PLAN NOTE
Confirmed with patient and son that she does NOT already have a diagnosis of malignant melanoma as she had told me at our previous visit   Our office confirmed with Dr Lilia Vela that she has not yet had a biopsy and is scheduled for one on 2/19

## 2019-02-12 ENCOUNTER — TELEPHONE (OUTPATIENT)
Dept: OTHER | Facility: OTHER | Age: 74
End: 2019-02-12

## 2019-02-12 DIAGNOSIS — F32.3 MAJOR DEPRESSION WITH PSYCHOTIC FEATURES (HCC): ICD-10-CM

## 2019-02-12 NOTE — TELEPHONE ENCOUNTER
Patient called wanting to let Apple Antoine know she needs help setting up an appointment with a psychiatrist, or an alternative referral doctor

## 2019-02-13 RX ORDER — OLANZAPINE 2.5 MG/1
5 TABLET ORAL
Qty: 60 TABLET | Refills: 2 | Status: SHIPPED | OUTPATIENT
Start: 2019-02-13 | End: 2019-02-27 | Stop reason: SDUPTHER

## 2019-02-13 NOTE — TELEPHONE ENCOUNTER
Clinical Staff: Please see message below, once you discuss this with the patient, please send message to clerical team as below  Thanks  The Olanzapine is important because it will help with her sleep and her psychosis type symptoms  If she is concerned, she can take 1/2 pill instead  Because of the level of her diabetes and it being relatively well controlled, she does not have to worry about it affecting her right now  The Sertraline is a very low dose and it will not impair her ability to drive, so she should continue that  That medication will usually take 4-6 weeks before she even notices a difference, so she should continue it  Clerical Staff: Especially now with the the passing of her , patient needs to see Aida LOMAX, to help facilitate psychiatry appts with Liliane Luna or at least to get her set up with grief groups to help her cope  If Aida Perez, doesn't have any appointments for this week, please send her an inbox message to facilitate   She may also need a partial program

## 2019-02-13 NOTE — TELEPHONE ENCOUNTER
Patient called back and received message  She is very concerned about taking OLANZAPINE 5 MG  She said she gets very confused when she takes this  She read about it and this is a side effect with patient's with diabetes  She is also concerned about the SERTRALINE 25 mg  She said it is very strong and she worries about driving with this medication  Please advise  She also needs to FUP with Óscar Lagunas or Efren Herron  She does not have anything scheduled with Psych  Patient's  passed recently and she is very concerned about handling daily acitivites while being on these medications

## 2019-02-13 NOTE — TELEPHONE ENCOUNTER
Pt called back and I was able to get her scheduled with Deborah Lucas at [facility] for 02/26/19  Also, she will be running out of the olanzepine 5mg tablets and needs a refill  She is asking if she can have pills that have a score grace on them in case she needs to take half of one  She has a question about her Vitamin D, wants to know the best time of day for her to take it in case it interferes with her levothyroxine  Pt did confirm her appt with Dr Shadia Her but they told her she cannot take any medications like blood thinners before the biopsy  She read that one of the side effects of her sertraline and olanzapine could be bleeding  She is asking if she needs to stop taking these before her procedure

## 2019-02-13 NOTE — TELEPHONE ENCOUNTER
I sent in a refill of the Olanzapine  There is no way for me to know if they are scored or not unfortunately, that depends on her pharmacy  So instead I sent in 2 5mg tablets and she should take 2 of those at bedtime, but if she is feeling like she is having side effects, then she can just take 1  She should take her Vit D 2 hours apart from her Synthroid  Or if she does one in the morning, she can do the other at bedtime  Neither the Sertraline or Olanzapine are blood thinners, she can and should continue both before, during and after the biopsy    Thanks

## 2019-02-13 NOTE — TELEPHONE ENCOUNTER
Please call patient to ask about her situation  She was hospitalized inpatient and they discharged her early because concern that her  was on hospice and deteriorating quickly  I would assume that they set her up with some type of follow-up with a psychiatrist  Please ask if she doesn't have a follow-up or she would like to follow up with someone else  We may be able to reach out to Linda Conte to see if she can ask Luis Miguel Burrows to facilitate  Also ask which two meds she is asking about and why they can't be filled? Is it an insurance issue?

## 2019-02-18 DIAGNOSIS — F32.3 MAJOR DEPRESSION WITH PSYCHOTIC FEATURES (HCC): ICD-10-CM

## 2019-02-18 RX ORDER — SERTRALINE HYDROCHLORIDE 25 MG/1
25 TABLET, FILM COATED ORAL DAILY
Qty: 30 TABLET | Refills: 2 | Status: SHIPPED | OUTPATIENT
Start: 2019-02-18 | End: 2019-02-26 | Stop reason: SDUPTHER

## 2019-02-26 ENCOUNTER — OFFICE VISIT (OUTPATIENT)
Dept: BEHAVIORAL/MENTAL HEALTH CLINIC | Facility: CLINIC | Age: 74
End: 2019-02-26
Payer: COMMERCIAL

## 2019-02-26 ENCOUNTER — TELEPHONE (OUTPATIENT)
Dept: FAMILY MEDICINE CLINIC | Facility: CLINIC | Age: 74
End: 2019-02-26

## 2019-02-26 DIAGNOSIS — F32.3 MAJOR DEPRESSION WITH PSYCHOTIC FEATURES (HCC): Primary | ICD-10-CM

## 2019-02-26 DIAGNOSIS — F32.3 MAJOR DEPRESSION WITH PSYCHOTIC FEATURES (HCC): ICD-10-CM

## 2019-02-26 PROCEDURE — 90834 PSYTX W PT 45 MINUTES: CPT | Performed by: SOCIAL WORKER

## 2019-02-26 RX ORDER — SERTRALINE HYDROCHLORIDE 25 MG/1
25 TABLET, FILM COATED ORAL DAILY
Qty: 30 TABLET | Refills: 1 | Status: SHIPPED | OUTPATIENT
Start: 2019-02-26 | End: 2019-04-02 | Stop reason: SDUPTHER

## 2019-02-26 NOTE — TELEPHONE ENCOUNTER
Please Jennifer's message below and have her schedule an appt to discuss her bowel issues routinely or alternatively she can try Imodium daily PRN or at least on days when she is planning on leaving the house  Also please confirm what dose of the Zyprexa she is doing, there was some discussion if she was going to do 2 5mg or 5mg    I did refill her Zoloft in the meantime  Thanks 1-2 cups/cans per day

## 2019-02-26 NOTE — PSYCH
Assessment/Plan:      Diagnoses and all orders for this visit:    Major depression with psychotic features ([address] )          Subjective:     Patient ID: Emmanuel Balderrama is a 68 y o  female  Pt was admitted to hospital after previous visit for psychosis but then discharged because of  being end of life  Pt's  did pass away  Taking meds - sleeping better now - pt appears much better  Biopsies on her head/ear  Concern about Zyprexa - TD, strokes, balance issues, memory/confusion, sleepy, hungry, restless - reviewed doses/sife effects which pt is not having  Pt agrees to stay on meds at low doses since not side effects and they are effective   was retired [employer]  - no services yet - cremated - wants to keep ashes in urn - not interested in organized Sabianism - negative experience - planning small gathering at home in Spring  Not driving a lot - first time today  Not a good cook but eating now  Challenges with figuring out finances  Received higher SS payment  Finances a challenge but her sons are helping her now  Likely has cancer - biopsies performed - appt on the 11th to discuss tx/plans - son will go with her  Discussed PHP, Grief Share and [address] [place] [address] - pt working on medical issues first - incontinence an issue     Would like follow up at PCP until medical needs known then open to more counseling/community services      HPI    Review of Systems      Objective:     Physical Exam  oriented, engaged, calm, full range affect, engaged and increased stability since last visit, appropriate speech and eye contact, denies suicidal/homicidal ideations/current psychosis

## 2019-02-26 NOTE — TELEPHONE ENCOUNTER
----- Message from Jorge Salazar sent at 2/26/2019  2:46 PM EST -----  Regarding: Meds/Medical  Dr Estefanía Damico,  I saw Brenna Guess today and she's like a different person - much more stable - on psych meds for a month now  She is however fearful of going to [facility] and grief program because she is having incontinence issues including bowels leaking (yuk! I hear too much as a , lol)  Two things: could staff call her and get her in to see you so you can figure out if she needs a referral or just meds for this  Secondly - we're getting her a psych appt but it may be before she runs out of her psych meds - can you write a month's worth of her meds for her when you see her? She should get in to see psych by April though  She has  Follow up on the 11th related to biopsies and I will see her again on the 12th  Thanks!   Piotr Rushing

## 2019-02-26 NOTE — PATIENT INSTRUCTIONS
Follow up in two weeks  Call PCP for appt related to medical issues and refill on meds - sent in basket message  Call psych for outpatient psychiatrist appt - sent in basket message  Will explore PHP and Grief Share groups when medical issues stable

## 2019-02-27 ENCOUNTER — TELEPHONE (OUTPATIENT)
Dept: FAMILY MEDICINE CLINIC | Facility: CLINIC | Age: 74
End: 2019-02-27

## 2019-02-27 RX ORDER — OLANZAPINE 2.5 MG/1
2.5 TABLET ORAL
Qty: 30 TABLET | Refills: 2 | Status: SHIPPED | OUTPATIENT
Start: 2019-02-27 | End: 2019-04-02

## 2019-02-27 NOTE — TELEPHONE ENCOUNTER
Patient is scheduled for appointment on 3/4/19  She wanted to know she reduced her OLANZAPINE dose to 2 5 mg daily and is feeling fine on that   FYI

## 2019-03-04 ENCOUNTER — OFFICE VISIT (OUTPATIENT)
Dept: FAMILY MEDICINE CLINIC | Facility: CLINIC | Age: 74
End: 2019-03-04
Payer: COMMERCIAL

## 2019-03-04 VITALS
HEIGHT: 65 IN | DIASTOLIC BLOOD PRESSURE: 60 MMHG | SYSTOLIC BLOOD PRESSURE: 102 MMHG | BODY MASS INDEX: 20.06 KG/M2 | TEMPERATURE: 96.1 F | RESPIRATION RATE: 16 BRPM | HEART RATE: 68 BPM | OXYGEN SATURATION: 98 % | WEIGHT: 120.4 LBS

## 2019-03-04 DIAGNOSIS — R15.1 FECAL SMEARING: Primary | ICD-10-CM

## 2019-03-04 DIAGNOSIS — C44.41 BASAL CELL CARCINOMA (BCC) OF SCALP: ICD-10-CM

## 2019-03-04 DIAGNOSIS — F32.3 MAJOR DEPRESSION WITH PSYCHOTIC FEATURES (HCC): ICD-10-CM

## 2019-03-04 DIAGNOSIS — E03.9 ACQUIRED HYPOTHYROIDISM: ICD-10-CM

## 2019-03-04 PROCEDURE — 99214 OFFICE O/P EST MOD 30 MIN: CPT | Performed by: FAMILY MEDICINE

## 2019-03-04 PROCEDURE — 1036F TOBACCO NON-USER: CPT | Performed by: FAMILY MEDICINE

## 2019-03-04 PROCEDURE — 1160F RVW MEDS BY RX/DR IN RCRD: CPT | Performed by: FAMILY MEDICINE

## 2019-03-04 PROCEDURE — 3008F BODY MASS INDEX DOCD: CPT | Performed by: FAMILY MEDICINE

## 2019-03-04 RX ORDER — MELATONIN
1000 DAILY
COMMUNITY
End: 2019-05-23

## 2019-03-04 NOTE — ASSESSMENT & PLAN NOTE
MDD, Anxiety and OCD have improved greatly since starting Zoloft 25mg and Zyprexa 2 5mg, pt does have persistent preoccupations with ALEXIS and hygiene  Reassured patient on the benefit>risk with these meds given her improvement    Will be establishing with Psych next month  Continue with CBT

## 2019-03-04 NOTE — ASSESSMENT & PLAN NOTE
Very small amounts of fecal incontinence, likely related to history of rectovaginal fistula  Pt had procedure in [place] in the 1970s, reassured patient, reviewed signs to call back, advised to re-establish with colorectal surgeon, pt will make an appt after addressing other acute issues with specialists

## 2019-03-04 NOTE — ASSESSMENT & PLAN NOTE
Recent biopsy positive by Dr Ricardo Linton, scheduled with Dr Denilson Coreas for procedure on 3/19/19

## 2019-03-04 NOTE — PROGRESS NOTES
FAMILY MEDICINE PROGRESS NOTE  Fabiola Arellano 68 y o  female   DATE: March 4, 2019     ASSESSMENT and PLAN:  Fabiola Arellano is a 68 y o  female with:     Acquired hypothyroidism  Trial off of Synthroid at patient request  Recheck TSH in 6 weeks    Basal cell carcinoma (BCC) of scalp  Recent biopsy positive by Dr Geoff Duval, scheduled with Dr Carolyn Garrett for procedure on 3/19/19    Major depression with psychotic features [facility])  MDD, Anxiety and OCD have improved greatly since starting Zoloft 25mg and Zyprexa 2 5mg, pt does have persistent preoccupations with ALEXIS and hygiene  Reassured patient on the benefit>risk with these meds given her improvement  Will be establishing with Psych next month  Continue with CBT    Fecal smearing  Very small amounts of fecal incontinence, likely related to history of rectovaginal fistula  Pt had procedure in [place] in the 1970s, reassured patient, reviewed signs to call back, advised to re-establish with colorectal surgeon, pt will make an appt after addressing other acute issues with specialists    SUBJECTIVE:  Fabiola Arellano is a 68 y o  female who presents today with a chief complaint of Diarrhea  Pt is here for chronic diarrhea that she has had since December 2018, but usually she doesn't have diarrhea, she keeps a mini pads on  She notices "black flakes" on her minipad that is coming from her rectum  She has been having some bleeding that she is unsure of if it is coming from her vagina or rectum  She has a history of rectovaginal fistulectomy after her last pregnancy  She has severe psych issues and has been compliant with her psych meds  She is concerned about the side effects of her Zyprexa  She still has preoccupations with compulsions of hygiene and is a "germophobe "  She is requesting a trial off of her thyroid medications, she has been on it for about 1 year  She did recently have positive biopsies by Dr Geoff Duval for basal cell cancer and is in the process  Review of Systems   Gastrointestinal: Positive for blood in stool and diarrhea  Negative for abdominal pain  Psychiatric/Behavioral: Positive for sleep disturbance  Negative for decreased concentration and suicidal ideas  The patient is nervous/anxious  I have reviewed the patient's PMH, Social History, Medication List and Allergies as appropriate  OBJECTIVE:  /60   Pulse 68   Temp (!) 96 1 °F (35 6 °C)   Resp 16   Ht 5' 4 5" (1 638 m)   Wt 54 6 kg (120 lb 6 4 oz)   SpO2 98%   BMI 20 35 kg/m²    Physical Exam   Constitutional: She appears well-developed and well-nourished  No distress  HENT:   Head: Normocephalic and atraumatic  Cardiovascular: Normal rate, regular rhythm and normal heart sounds  No murmur heard  Pulmonary/Chest: Effort normal and breath sounds normal  No respiratory distress  She has no wheezes  Abdominal: Soft  Normal appearance and bowel sounds are normal  There is no tenderness  Neurological: She is alert  Skin: She is not diaphoretic  Psychiatric: Her speech is normal and behavior is normal  Judgment and thought content normal  Her mood appears anxious  Cognition and memory are normal  She expresses no suicidal ideation  She expresses no suicidal plans  Vitals reviewed  Robert Li MD    Note: Portions of the record may have been created with voice recognition software  Occasional wrong word or "sound a like" substitutions may have occurred due to the inherent limitations of voice recognition software  Read the chart carefully and recognize, using context, where substitutions have occurred

## 2019-03-12 ENCOUNTER — OFFICE VISIT (OUTPATIENT)
Dept: BEHAVIORAL/MENTAL HEALTH CLINIC | Facility: CLINIC | Age: 74
End: 2019-03-12
Payer: COMMERCIAL

## 2019-03-12 DIAGNOSIS — F32.3 MAJOR DEPRESSION WITH PSYCHOTIC FEATURES (HCC): Primary | ICD-10-CM

## 2019-03-12 PROCEDURE — 90834 PSYTX W PT 45 MINUTES: CPT | Performed by: SOCIAL WORKER

## 2019-03-12 NOTE — PSYCH
Assessment/Plan:      Diagnoses and all orders for this visit:    Major depression with psychotic features ([address] )          Subjective:     Patient ID: Jeniffer Bess is a 68 y o  female  Grieving   "Massive paperwork from Martha Christie' death"     Insurance changes under her name  When testing, math scores always low - feels math and numbers a problem  Appetite slightly better, sleeping better, biopsies show basal cell Cancer - appt 3/19/19 - Dr Edwina Roche, Oncology - son Roni Mess going with her  Went off thyroid meds - "want to get rid of a med"   Will repeat blood work in 6 weeks  Pt reports pacing around house at times, difficulty relaxing, not able to focus on knitting  Brought samples of elaborate knitting projects pt has done in the past   Anger at  for not forcing her to be more knowledgable about bills, etc  Anxiety about germs continue  "If not dying of cancer , I want to get out of large house and live in small house in [place] "    Denies psychosis at this time although obsessive about germs - denies suicidal/homicidal ideations and although overwhelmed, future oriented discussions  Fear about new CA dx  Discussed CBT, being present in moment more to prevent anxiety and prioritizing - wrote not to son asking about getting  to help mom as well as office of aging to help  Pt will discuss with son       HPI    Review of Systems      Objective:     Physical Exam  oriented, engaged, calm but sad and tearful at times, full range affect, continued improvement in engagement since stabilized on meds, denies suicidal/homicidal ideations/psychosis, poor insight/judgement, easily overhwlemed

## 2019-03-12 NOTE — PATIENT INSTRUCTIONS
Follow up on 4/9 - has multiple medical needs and psych appt on 4/2 in next few weeks  Provided [facility] Aging [facility] info to give to son to call to assess support they may provide  Pt declined me calling them at this time due to medical needs/cancer dx  Contact earlier if needed

## 2019-03-15 ENCOUNTER — DOCUMENTATION (OUTPATIENT)
Dept: HEMATOLOGY ONCOLOGY | Facility: CLINIC | Age: 74
End: 2019-03-15

## 2019-03-15 NOTE — PROGRESS NOTES
Pt called me back & we went over her benefits no other ins  sd no concerns at this time but if she has any she will call back to me

## 2019-03-15 NOTE — PROGRESS NOTES
Called pts ins &spoke to anabella call ref# [id number]  she has an active humana replacement plan w/an effective date 03/01/19  Runs on a calander year  Pt has chemo & radiation benefits  Chemo benefits pt has a $180 deduct which she has met -0-  Co ins 96/4 out of pocket $3500 she has met -0-  No co-pays  Chemo drugs covered 96% pt pay 4% for them  Radiation benefits are same as chemo   Dx imaging is covered 96/4 so pt has to pay 4% after the deduct has been met  Labs  Are the same as the imaging & pt isn't capitated to any lab  In pt surgeries & hospitalizations are covered 100%  rx plan thru [facility] they are also the specialty pharmacy  Can buy & bill  not capitated to any special pharmacy  [facility] is primary but they can't see who the secondary ins is pt needs to call & update tht I nfo  Called pt to go over this information got her voicemail  left message for her to call me back

## 2019-03-19 ENCOUNTER — CONSULT (OUTPATIENT)
Dept: SURGICAL ONCOLOGY | Facility: CLINIC | Age: 74
End: 2019-03-19
Payer: COMMERCIAL

## 2019-03-19 VITALS
DIASTOLIC BLOOD PRESSURE: 80 MMHG | WEIGHT: 119 LBS | TEMPERATURE: 98.4 F | HEART RATE: 63 BPM | RESPIRATION RATE: 16 BRPM | SYSTOLIC BLOOD PRESSURE: 120 MMHG | BODY MASS INDEX: 19.83 KG/M2 | HEIGHT: 65 IN

## 2019-03-19 DIAGNOSIS — C44.41 BASAL CELL CARCINOMA (BCC) OF SCALP: Primary | ICD-10-CM

## 2019-03-19 PROCEDURE — 99204 OFFICE O/P NEW MOD 45 MIN: CPT | Performed by: SURGERY
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
## 2019-03-20 ENCOUNTER — DOCUMENTATION (OUTPATIENT)
Dept: RADIATION ONCOLOGY | Facility: HOSPITAL | Age: 74
End: 2019-03-20

## 2019-03-20 NOTE — PROGRESS NOTES
Called the pt, reviewed my role with her and arranged to meet with her at [facility] on 4/11/19 at 1100 am

## 2019-04-02 ENCOUNTER — OFFICE VISIT (OUTPATIENT)
Dept: PSYCHIATRY | Facility: CLINIC | Age: 74
End: 2019-04-02
Payer: COMMERCIAL

## 2019-04-02 DIAGNOSIS — F32.3 MAJOR DEPRESSION WITH PSYCHOTIC FEATURES (HCC): Primary | ICD-10-CM

## 2019-04-02 PROCEDURE — 90792 PSYCH DIAG EVAL W/MED SRVCS: CPT | Performed by: PSYCHIATRY & NEUROLOGY

## 2019-04-02 RX ORDER — OLANZAPINE 2.5 MG/1
2.5 TABLET ORAL
Qty: 90 TABLET | Refills: 0 | Status: SHIPPED | OUTPATIENT
Start: 2019-04-02 | End: 2019-05-10 | Stop reason: SDUPTHER

## 2019-04-02 RX ORDER — SERTRALINE HYDROCHLORIDE 25 MG/1
25 TABLET, FILM COATED ORAL DAILY
Qty: 30 TABLET | Refills: 1 | Status: SHIPPED | OUTPATIENT
Start: 2019-04-02 | End: 2019-05-10 | Stop reason: SDUPTHER

## 2019-04-04 ENCOUNTER — DOCUMENTATION (OUTPATIENT)
Dept: HEMATOLOGY ONCOLOGY | Facility: CLINIC | Age: 74
End: 2019-04-04

## 2019-04-08 ENCOUNTER — CONSULT (OUTPATIENT)
Dept: PLASTIC SURGERY | Facility: CLINIC | Age: 74
End: 2019-04-08
Payer: COMMERCIAL

## 2019-04-08 VITALS — WEIGHT: 119.4 LBS | BODY MASS INDEX: 19.89 KG/M2 | HEIGHT: 65 IN

## 2019-04-08 DIAGNOSIS — C44.41 BASAL CELL CARCINOMA (BCC) OF SCALP: Primary | ICD-10-CM

## 2019-04-08 DIAGNOSIS — C44.219 BASAL CELL CARCINOMA, EAR, LEFT: ICD-10-CM

## 2019-04-08 PROCEDURE — 99204 OFFICE O/P NEW MOD 45 MIN: CPT | Performed by: PHYSICIAN ASSISTANT

## 2019-04-09 ENCOUNTER — OFFICE VISIT (OUTPATIENT)
Dept: BEHAVIORAL/MENTAL HEALTH CLINIC | Facility: CLINIC | Age: 74
End: 2019-04-09
Payer: COMMERCIAL

## 2019-04-09 DIAGNOSIS — F32.3 MAJOR DEPRESSION WITH PSYCHOTIC FEATURES (HCC): Primary | ICD-10-CM

## 2019-04-09 PROCEDURE — 90834 PSYTX W PT 45 MINUTES: CPT | Performed by: SOCIAL WORKER

## 2019-04-11 ENCOUNTER — DOCUMENTATION (OUTPATIENT)
Dept: RADIATION ONCOLOGY | Facility: HOSPITAL | Age: 74
End: 2019-04-11

## 2019-04-11 ENCOUNTER — TELEPHONE (OUTPATIENT)
Dept: SURGICAL ONCOLOGY | Facility: CLINIC | Age: 74
End: 2019-04-11

## 2019-04-25 ENCOUNTER — APPOINTMENT (OUTPATIENT)
Dept: LAB | Facility: CLINIC | Age: 74
End: 2019-04-25
Payer: COMMERCIAL

## 2019-04-25 DIAGNOSIS — E03.9 ACQUIRED HYPOTHYROIDISM: ICD-10-CM

## 2019-04-25 LAB — TSH SERPL DL<=0.05 MIU/L-ACNC: 2.24 UIU/ML (ref 0.36–3.74)

## 2019-04-25 PROCEDURE — 84443 ASSAY THYROID STIM HORMONE: CPT

## 2019-04-25 PROCEDURE — 36415 COLL VENOUS BLD VENIPUNCTURE: CPT

## 2019-05-06 PROBLEM — E06.3 HASHIMOTO'S DISEASE: Status: RESOLVED | Noted: 2019-05-06 | Resolved: 2019-05-06

## 2019-05-07 ENCOUNTER — OFFICE VISIT (OUTPATIENT)
Dept: BEHAVIORAL/MENTAL HEALTH CLINIC | Facility: CLINIC | Age: 74
End: 2019-05-07
Payer: COMMERCIAL

## 2019-05-07 ENCOUNTER — OFFICE VISIT (OUTPATIENT)
Dept: SURGICAL ONCOLOGY | Facility: CLINIC | Age: 74
End: 2019-05-07

## 2019-05-07 VITALS
WEIGHT: 122 LBS | HEIGHT: 65 IN | DIASTOLIC BLOOD PRESSURE: 80 MMHG | RESPIRATION RATE: 15 BRPM | SYSTOLIC BLOOD PRESSURE: 142 MMHG | BODY MASS INDEX: 20.33 KG/M2 | TEMPERATURE: 97.4 F | HEART RATE: 79 BPM

## 2019-05-07 DIAGNOSIS — C44.41 BASAL CELL CARCINOMA (BCC) OF SCALP: Primary | ICD-10-CM

## 2019-05-07 DIAGNOSIS — F32.3 MAJOR DEPRESSION WITH PSYCHOTIC FEATURES (HCC): Primary | ICD-10-CM

## 2019-05-07 PROCEDURE — PREOP: Performed by: SURGERY

## 2019-05-07 PROCEDURE — 90834 PSYTX W PT 45 MINUTES: CPT | Performed by: SOCIAL WORKER

## 2019-05-07 RX ORDER — HYDROCODONE BITARTRATE AND ACETAMINOPHEN 5; 325 MG/1; MG/1
1 TABLET ORAL EVERY 6 HOURS PRN
Qty: 20 TABLET | Refills: 0 | Status: SHIPPED | OUTPATIENT
Start: 2019-05-07 | End: 2019-05-23

## 2019-05-08 ENCOUNTER — APPOINTMENT (OUTPATIENT)
Dept: LAB | Facility: CLINIC | Age: 74
End: 2019-05-08
Payer: COMMERCIAL

## 2019-05-08 ENCOUNTER — HOSPITAL ENCOUNTER (OUTPATIENT)
Dept: RADIOLOGY | Facility: HOSPITAL | Age: 74
Discharge: HOME | End: 2019-05-08
Attending: SURGERY
Payer: COMMERCIAL

## 2019-05-08 DIAGNOSIS — C44.41 BASAL CELL CARCINOMA (BCC) OF SCALP: ICD-10-CM

## 2019-05-08 LAB
ALBUMIN SERPL BCP-MCNC: 4 G/DL (ref 3.5–5)
ALP SERPL-CCNC: 79 U/L (ref 46–116)
ALT SERPL W P-5'-P-CCNC: 44 U/L (ref 12–78)
ANION GAP SERPL CALCULATED.3IONS-SCNC: 7 MMOL/L (ref 4–13)
APTT PPP: 32 SECONDS (ref 26–38)
AST SERPL W P-5'-P-CCNC: 26 U/L (ref 5–45)
ATRIAL RATE: 57 BPM
BASOPHILS # BLD AUTO: 0.03 THOUSANDS/ΜL (ref 0–0.1)
BASOPHILS NFR BLD AUTO: 1 % (ref 0–1)
BILIRUB SERPL-MCNC: 0.4 MG/DL (ref 0.2–1)
BUN SERPL-MCNC: 16 MG/DL (ref 5–25)
CALCIUM SERPL-MCNC: 9.5 MG/DL (ref 8.3–10.1)
CHLORIDE SERPL-SCNC: 104 MMOL/L (ref 100–108)
CO2 SERPL-SCNC: 31 MMOL/L (ref 21–32)
CREAT SERPL-MCNC: 0.6 MG/DL (ref 0.6–1.3)
EOSINOPHIL # BLD AUTO: 0.17 THOUSAND/ΜL (ref 0–0.61)
EOSINOPHIL NFR BLD AUTO: 3 % (ref 0–6)
ERYTHROCYTE [DISTWIDTH] IN BLOOD BY AUTOMATED COUNT: 13.2 % (ref 11.6–15.1)
EST. AVERAGE GLUCOSE BLD GHB EST-MCNC: 123 MG/DL
GFR SERPL CREATININE-BSD FRML MDRD: 91 ML/MIN/1.73SQ M
GLUCOSE P FAST SERPL-MCNC: 107 MG/DL (ref 65–99)
HBA1C MFR BLD: 5.9 % (ref 4.2–6.3)
HCT VFR BLD AUTO: 40.8 % (ref 34.8–46.1)
HGB BLD-MCNC: 13.2 G/DL (ref 11.5–15.4)
IMM GRANULOCYTES # BLD AUTO: 0.01 THOUSAND/UL (ref 0–0.2)
IMM GRANULOCYTES NFR BLD AUTO: 0 % (ref 0–2)
INR PPP: 1.07 (ref 0.86–1.17)
LYMPHOCYTES # BLD AUTO: 1.36 THOUSANDS/ΜL (ref 0.6–4.47)
LYMPHOCYTES NFR BLD AUTO: 26 % (ref 14–44)
MCH RBC QN AUTO: 30.1 PG (ref 26.8–34.3)
MCHC RBC AUTO-ENTMCNC: 32.4 G/DL (ref 31.4–37.4)
MCV RBC AUTO: 93 FL (ref 82–98)
MONOCYTES # BLD AUTO: 0.56 THOUSAND/ΜL (ref 0.17–1.22)
MONOCYTES NFR BLD AUTO: 11 % (ref 4–12)
NEUTROPHILS # BLD AUTO: 3.17 THOUSANDS/ΜL (ref 1.85–7.62)
NEUTS SEG NFR BLD AUTO: 59 % (ref 43–75)
NRBC BLD AUTO-RTO: 0 /100 WBCS
P AXIS: 67 DEGREES
PLATELET # BLD AUTO: 181 THOUSANDS/UL (ref 149–390)
PMV BLD AUTO: 10.1 FL (ref 8.9–12.7)
POTASSIUM SERPL-SCNC: 4.1 MMOL/L (ref 3.5–5.3)
PR INTERVAL: 116 MS
PROT SERPL-MCNC: 7.3 G/DL (ref 6.4–8.2)
PROTHROMBIN TIME: 13.6 SECONDS (ref 11.8–14.2)
QRS AXIS: -60 DEGREES
QRSD INTERVAL: 66 MS
QT INTERVAL: 426 MS
QTC INTERVAL: 414 MS
RBC # BLD AUTO: 4.39 MILLION/UL (ref 3.81–5.12)
SODIUM SERPL-SCNC: 142 MMOL/L (ref 136–145)
T WAVE AXIS: 48 DEGREES
VENTRICULAR RATE: 57 BPM
WBC # BLD AUTO: 5.3 THOUSAND/UL (ref 4.31–10.16)

## 2019-05-08 PROCEDURE — 93005 ELECTROCARDIOGRAM TRACING: CPT

## 2019-05-08 PROCEDURE — 71046 X-RAY EXAM CHEST 2 VIEWS: CPT

## 2019-05-08 PROCEDURE — 93010 ELECTROCARDIOGRAM REPORT: CPT | Performed by: INTERNAL MEDICINE

## 2019-05-08 PROCEDURE — 85730 THROMBOPLASTIN TIME PARTIAL: CPT

## 2019-05-08 PROCEDURE — 80053 COMPREHEN METABOLIC PANEL: CPT

## 2019-05-08 PROCEDURE — 85610 PROTHROMBIN TIME: CPT

## 2019-05-08 PROCEDURE — 83036 HEMOGLOBIN GLYCOSYLATED A1C: CPT

## 2019-05-08 PROCEDURE — 85025 COMPLETE CBC W/AUTO DIFF WBC: CPT

## 2019-05-08 PROCEDURE — 36415 COLL VENOUS BLD VENIPUNCTURE: CPT

## 2019-05-09 ENCOUNTER — DOCUMENTATION (OUTPATIENT)
Dept: PSYCHIATRY | Facility: CLINIC | Age: 74
End: 2019-05-09

## 2019-05-10 ENCOUNTER — OFFICE VISIT (OUTPATIENT)
Dept: PSYCHIATRY | Facility: CLINIC | Age: 74
End: 2019-05-10
Payer: COMMERCIAL

## 2019-05-10 DIAGNOSIS — F32.3 MAJOR DEPRESSION WITH PSYCHOTIC FEATURES (HCC): ICD-10-CM

## 2019-05-10 PROCEDURE — 99213 OFFICE O/P EST LOW 20 MIN: CPT | Performed by: PSYCHIATRY & NEUROLOGY

## 2019-05-10 RX ORDER — SERTRALINE HYDROCHLORIDE 25 MG/1
25 TABLET, FILM COATED ORAL DAILY
Qty: 90 TABLET | Refills: 2 | Status: SHIPPED | OUTPATIENT
Start: 2019-05-10 | End: 2019-08-09 | Stop reason: SDUPTHER

## 2019-05-10 RX ORDER — OLANZAPINE 2.5 MG/1
2.5 TABLET ORAL
Qty: 90 TABLET | Refills: 2 | Status: SHIPPED | OUTPATIENT
Start: 2019-05-10 | End: 2019-07-22 | Stop reason: SDUPTHER

## 2019-05-16 PROCEDURE — NC001 PR NO CHARGE: Performed by: PHYSICIAN ASSISTANT

## 2019-05-28 ENCOUNTER — ANESTHESIA EVENT (OUTPATIENT)
Dept: PERIOP | Facility: HOSPITAL | Age: 74
End: 2019-05-28
Payer: COMMERCIAL

## 2019-05-29 ENCOUNTER — ANESTHESIA (OUTPATIENT)
Dept: PERIOP | Facility: HOSPITAL | Age: 74
End: 2019-05-29
Payer: COMMERCIAL

## 2019-05-29 ENCOUNTER — HOSPITAL ENCOUNTER (OUTPATIENT)
Facility: HOSPITAL | Age: 74
Setting detail: OUTPATIENT SURGERY
Discharge: HOME | End: 2019-05-29
Attending: SURGERY | Admitting: SURGERY
Payer: COMMERCIAL

## 2019-05-29 VITALS
DIASTOLIC BLOOD PRESSURE: 57 MMHG | HEART RATE: 64 BPM | BODY MASS INDEX: 21.16 KG/M2 | OXYGEN SATURATION: 99 % | HEIGHT: 65 IN | TEMPERATURE: 96.6 F | RESPIRATION RATE: 18 BRPM | SYSTOLIC BLOOD PRESSURE: 132 MMHG | WEIGHT: 127 LBS

## 2019-05-29 DIAGNOSIS — C44.41 BASAL CELL CARCINOMA (BCC) OF SCALP: ICD-10-CM

## 2019-05-29 DIAGNOSIS — Z98.890 POST-OPERATIVE STATE: Primary | ICD-10-CM

## 2019-05-29 PROBLEM — C44.310 BASAL CELL CARCINOMA OF SKIN OF FACE: Status: ACTIVE | Noted: 2019-05-29

## 2019-05-29 LAB
GLUCOSE SERPL-MCNC: 119 MG/DL (ref 65–140)
GLUCOSE SERPL-MCNC: 201 MG/DL (ref 65–140)
GLUCOSE SERPL-MCNC: 209 MG/DL (ref 65–140)

## 2019-05-29 PROCEDURE — 88305 TISSUE EXAM BY PATHOLOGIST: CPT | Performed by: PATHOLOGY

## 2019-05-29 PROCEDURE — 15004 WOUND PREP F/N/HF/G: CPT | Performed by: SURGERY

## 2019-05-29 PROCEDURE — 11626 EXC S/N/H/F/G MAL+MRG >4 CM: CPT | Performed by: SURGERY

## 2019-05-29 PROCEDURE — 82948 REAGENT STRIP/BLOOD GLUCOSE: CPT

## 2019-05-29 PROCEDURE — 15120 SPLT AGRFT F/S/N/H/F/G/M 1ST: CPT | Performed by: SURGERY

## 2019-05-29 PROCEDURE — 11642 EXC F/E/E/N/L MAL+MRG 1.1-2: CPT | Performed by: SURGERY

## 2019-05-29 PROCEDURE — 11644 EXC F/E/E/N/L MAL+MRG 3.1-4: CPT | Performed by: SURGERY

## 2019-05-29 PROCEDURE — 11624 EXC S/N/H/F/G MAL+MRG 3.1-4: CPT | Performed by: SURGERY

## 2019-05-29 PROCEDURE — 15260 FTH/GFT FR N/E/E/L 20 SQCM/<: CPT | Performed by: SURGERY

## 2019-05-29 PROCEDURE — 14301 TIS TRNFR ANY 30.1-60 SQ CM: CPT | Performed by: SURGERY

## 2019-05-29 RX ORDER — NEOSTIGMINE METHYLSULFATE 1 MG/ML
INJECTION INTRAVENOUS AS NEEDED
Status: DISCONTINUED | OUTPATIENT
Start: 2019-05-29 | End: 2019-05-29 | Stop reason: SURG

## 2019-05-29 RX ORDER — LIDOCAINE HYDROCHLORIDE 10 MG/ML
INJECTION, SOLUTION INFILTRATION; PERINEURAL AS NEEDED
Status: DISCONTINUED | OUTPATIENT
Start: 2019-05-29 | End: 2019-05-29 | Stop reason: SURG

## 2019-05-29 RX ORDER — SODIUM CHLORIDE, SODIUM LACTATE, POTASSIUM CHLORIDE, CALCIUM CHLORIDE 600; 310; 30; 20 MG/100ML; MG/100ML; MG/100ML; MG/100ML
125 INJECTION, SOLUTION INTRAVENOUS CONTINUOUS
Status: DISCONTINUED | OUTPATIENT
Start: 2019-05-29 | End: 2019-05-29 | Stop reason: HOSPADM

## 2019-05-29 RX ORDER — ROCURONIUM BROMIDE 10 MG/ML
INJECTION, SOLUTION INTRAVENOUS AS NEEDED
Status: DISCONTINUED | OUTPATIENT
Start: 2019-05-29 | End: 2019-05-29 | Stop reason: SURG

## 2019-05-29 RX ORDER — CEFAZOLIN SODIUM 1 G/50ML
1000 SOLUTION INTRAVENOUS ONCE
Status: COMPLETED | OUTPATIENT
Start: 2019-05-29 | End: 2019-05-29

## 2019-05-29 RX ORDER — METOCLOPRAMIDE HYDROCHLORIDE 5 MG/ML
10 INJECTION INTRAMUSCULAR; INTRAVENOUS ONCE
Status: COMPLETED | OUTPATIENT
Start: 2019-05-29 | End: 2019-05-29

## 2019-05-29 RX ORDER — GINSENG 100 MG
CAPSULE ORAL AS NEEDED
Status: DISCONTINUED | OUTPATIENT
Start: 2019-05-29 | End: 2019-05-29 | Stop reason: HOSPADM

## 2019-05-29 RX ORDER — METOCLOPRAMIDE HYDROCHLORIDE 5 MG/ML
10 INJECTION INTRAMUSCULAR; INTRAVENOUS ONCE AS NEEDED
Status: DISCONTINUED | OUTPATIENT
Start: 2019-05-29 | End: 2019-05-29 | Stop reason: HOSPADM

## 2019-05-29 RX ORDER — FENTANYL CITRATE/PF 50 MCG/ML
25 SYRINGE (ML) INJECTION
Status: DISCONTINUED | OUTPATIENT
Start: 2019-05-29 | End: 2019-05-29 | Stop reason: HOSPADM

## 2019-05-29 RX ORDER — PROPOFOL 10 MG/ML
INJECTION, EMULSION INTRAVENOUS AS NEEDED
Status: DISCONTINUED | OUTPATIENT
Start: 2019-05-29 | End: 2019-05-29 | Stop reason: SURG

## 2019-05-29 RX ORDER — GLYCOPYRROLATE 0.2 MG/ML
INJECTION INTRAMUSCULAR; INTRAVENOUS AS NEEDED
Status: DISCONTINUED | OUTPATIENT
Start: 2019-05-29 | End: 2019-05-29 | Stop reason: SURG

## 2019-05-29 RX ORDER — FENTANYL CITRATE 50 UG/ML
INJECTION, SOLUTION INTRAMUSCULAR; INTRAVENOUS AS NEEDED
Status: DISCONTINUED | OUTPATIENT
Start: 2019-05-29 | End: 2019-05-29 | Stop reason: SURG

## 2019-05-29 RX ORDER — MIDAZOLAM HYDROCHLORIDE 1 MG/ML
INJECTION INTRAMUSCULAR; INTRAVENOUS AS NEEDED
Status: DISCONTINUED | OUTPATIENT
Start: 2019-05-29 | End: 2019-05-29 | Stop reason: SURG

## 2019-05-29 RX ORDER — ACETAMINOPHEN 325 MG/1
975 TABLET ORAL ONCE
Status: COMPLETED | OUTPATIENT
Start: 2019-05-29 | End: 2019-05-29

## 2019-05-29 RX ORDER — BUPIVACAINE HYDROCHLORIDE 2.5 MG/ML
INJECTION, SOLUTION EPIDURAL; INFILTRATION; INTRACAUDAL AS NEEDED
Status: DISCONTINUED | OUTPATIENT
Start: 2019-05-29 | End: 2019-05-29 | Stop reason: HOSPADM

## 2019-05-29 RX ORDER — KETOROLAC TROMETHAMINE 30 MG/ML
INJECTION, SOLUTION INTRAMUSCULAR; INTRAVENOUS AS NEEDED
Status: DISCONTINUED | OUTPATIENT
Start: 2019-05-29 | End: 2019-05-29 | Stop reason: SURG

## 2019-05-29 RX ORDER — LIDOCAINE HYDROCHLORIDE AND EPINEPHRINE 10; 10 MG/ML; UG/ML
INJECTION, SOLUTION INFILTRATION; PERINEURAL AS NEEDED
Status: DISCONTINUED | OUTPATIENT
Start: 2019-05-29 | End: 2019-05-29 | Stop reason: HOSPADM

## 2019-05-29 RX ORDER — SODIUM CHLORIDE, SODIUM LACTATE, POTASSIUM CHLORIDE, CALCIUM CHLORIDE 600; 310; 30; 20 MG/100ML; MG/100ML; MG/100ML; MG/100ML
75 INJECTION, SOLUTION INTRAVENOUS CONTINUOUS
Status: DISCONTINUED | OUTPATIENT
Start: 2019-05-29 | End: 2019-05-29 | Stop reason: HOSPADM

## 2019-05-29 RX ORDER — HYDROMORPHONE HCL/PF 1 MG/ML
0.2 SYRINGE (ML) INJECTION
Status: DISCONTINUED | OUTPATIENT
Start: 2019-05-29 | End: 2019-05-29 | Stop reason: HOSPADM

## 2019-05-29 RX ORDER — MAGNESIUM HYDROXIDE 1200 MG/15ML
LIQUID ORAL AS NEEDED
Status: DISCONTINUED | OUTPATIENT
Start: 2019-05-29 | End: 2019-05-29 | Stop reason: HOSPADM

## 2019-05-29 RX ORDER — ONDANSETRON 2 MG/ML
4 INJECTION INTRAMUSCULAR; INTRAVENOUS EVERY 6 HOURS PRN
Status: DISCONTINUED | OUTPATIENT
Start: 2019-05-29 | End: 2019-05-29 | Stop reason: HOSPADM

## 2019-05-29 RX ORDER — KETAMINE HYDROCHLORIDE 50 MG/ML
INJECTION, SOLUTION, CONCENTRATE INTRAMUSCULAR; INTRAVENOUS AS NEEDED
Status: DISCONTINUED | OUTPATIENT
Start: 2019-05-29 | End: 2019-05-29 | Stop reason: SURG

## 2019-05-29 RX ORDER — ONDANSETRON 2 MG/ML
INJECTION INTRAMUSCULAR; INTRAVENOUS AS NEEDED
Status: DISCONTINUED | OUTPATIENT
Start: 2019-05-29 | End: 2019-05-29 | Stop reason: SURG

## 2019-05-29 RX ORDER — SODIUM CHLORIDE 9 MG/ML
INJECTION, SOLUTION INTRAVENOUS CONTINUOUS PRN
Status: DISCONTINUED | OUTPATIENT
Start: 2019-05-29 | End: 2019-05-29 | Stop reason: SURG

## 2019-05-29 RX ORDER — PREGABALIN 75 MG/1
150 CAPSULE ORAL ONCE
Status: COMPLETED | OUTPATIENT
Start: 2019-05-29 | End: 2019-05-29

## 2019-05-29 RX ORDER — EPHEDRINE SULFATE 50 MG/ML
INJECTION INTRAVENOUS AS NEEDED
Status: DISCONTINUED | OUTPATIENT
Start: 2019-05-29 | End: 2019-05-29 | Stop reason: SURG

## 2019-05-29 RX ORDER — MINERAL OIL
OIL (ML) MISCELLANEOUS AS NEEDED
Status: DISCONTINUED | OUTPATIENT
Start: 2019-05-29 | End: 2019-05-29 | Stop reason: HOSPADM

## 2019-05-29 RX ORDER — DEXAMETHASONE SODIUM PHOSPHATE 10 MG/ML
INJECTION, SOLUTION INTRAMUSCULAR; INTRAVENOUS AS NEEDED
Status: DISCONTINUED | OUTPATIENT
Start: 2019-05-29 | End: 2019-05-29 | Stop reason: SURG

## 2019-05-29 RX ADMIN — ROCURONIUM BROMIDE 60 MG: 10 INJECTION, SOLUTION INTRAVENOUS at 08:32

## 2019-05-29 RX ADMIN — CEFAZOLIN SODIUM 1000 MG: 1 SOLUTION INTRAVENOUS at 08:35

## 2019-05-29 RX ADMIN — PHENYLEPHRINE HYDROCHLORIDE 100 MCG: 10 INJECTION INTRAVENOUS at 08:52

## 2019-05-29 RX ADMIN — KETOROLAC TROMETHAMINE 30 MG: 30 INJECTION, SOLUTION INTRAMUSCULAR at 12:05

## 2019-05-29 RX ADMIN — KETAMINE HYDROCHLORIDE 10 MG: 50 INJECTION, SOLUTION INTRAMUSCULAR; INTRAVENOUS at 09:09

## 2019-05-29 RX ADMIN — ONDANSETRON 4 MG: 2 INJECTION INTRAMUSCULAR; INTRAVENOUS at 12:02

## 2019-05-29 RX ADMIN — GLYCOPYRROLATE 0.7 MG: 0.2 INJECTION, SOLUTION INTRAMUSCULAR; INTRAVENOUS at 12:10

## 2019-05-29 RX ADMIN — ONDANSETRON 4 MG: 2 INJECTION INTRAMUSCULAR; INTRAVENOUS at 17:37

## 2019-05-29 RX ADMIN — KETAMINE HYDROCHLORIDE 10 MG: 50 INJECTION, SOLUTION INTRAMUSCULAR; INTRAVENOUS at 11:03

## 2019-05-29 RX ADMIN — NEOSTIGMINE METHYLSULFATE 3.5 MG: 1 INJECTION, SOLUTION INTRAVENOUS at 12:10

## 2019-05-29 RX ADMIN — DEXAMETHASONE SODIUM PHOSPHATE 10 MG: 10 INJECTION, SOLUTION INTRAMUSCULAR; INTRAVENOUS at 08:32

## 2019-05-29 RX ADMIN — FENTANYL CITRATE 100 MCG: 50 INJECTION, SOLUTION INTRAMUSCULAR; INTRAVENOUS at 08:32

## 2019-05-29 RX ADMIN — ACETAMINOPHEN 975 MG: 325 TABLET ORAL at 07:34

## 2019-05-29 RX ADMIN — SODIUM CHLORIDE: 9 INJECTION, SOLUTION INTRAVENOUS at 08:39

## 2019-05-29 RX ADMIN — KETAMINE HYDROCHLORIDE 30 MG: 50 INJECTION, SOLUTION INTRAMUSCULAR; INTRAVENOUS at 08:32

## 2019-05-29 RX ADMIN — SODIUM CHLORIDE, SODIUM LACTATE, POTASSIUM CHLORIDE, AND CALCIUM CHLORIDE: .6; .31; .03; .02 INJECTION, SOLUTION INTRAVENOUS at 07:45

## 2019-05-29 RX ADMIN — PHENYLEPHRINE HYDROCHLORIDE 20 MCG/MIN: 10 INJECTION INTRAVENOUS at 08:52

## 2019-05-29 RX ADMIN — PREGABALIN 150 MG: 75 CAPSULE ORAL at 07:34

## 2019-05-29 RX ADMIN — PHENYLEPHRINE HYDROCHLORIDE 20 MCG/MIN: 10 INJECTION INTRAVENOUS at 11:58

## 2019-05-29 RX ADMIN — PROPOFOL 150 MG: 10 INJECTION, EMULSION INTRAVENOUS at 08:32

## 2019-05-29 RX ADMIN — EPHEDRINE SULFATE 5 MG: 50 INJECTION, SOLUTION INTRAVENOUS at 08:39

## 2019-05-29 RX ADMIN — LIDOCAINE HYDROCHLORIDE 50 MG: 10 INJECTION, SOLUTION INFILTRATION; PERINEURAL at 08:32

## 2019-05-29 RX ADMIN — EPHEDRINE SULFATE 10 MG: 50 INJECTION, SOLUTION INTRAVENOUS at 08:42

## 2019-05-29 RX ADMIN — MIDAZOLAM 1 MG: 1 INJECTION INTRAMUSCULAR; INTRAVENOUS at 08:26

## 2019-05-29 RX ADMIN — METOCLOPRAMIDE 10 MG: 5 INJECTION, SOLUTION INTRAMUSCULAR; INTRAVENOUS at 18:40

## 2019-05-29 RX ADMIN — MIDAZOLAM 1 MG: 1 INJECTION INTRAMUSCULAR; INTRAVENOUS at 08:31

## 2019-05-30 ENCOUNTER — TELEPHONE (OUTPATIENT)
Dept: PLASTIC SURGERY | Facility: CLINIC | Age: 74
End: 2019-05-30

## 2019-06-03 ENCOUNTER — OFFICE VISIT (OUTPATIENT)
Dept: PLASTIC SURGERY | Facility: CLINIC | Age: 74
End: 2019-06-03

## 2019-06-03 VITALS — HEIGHT: 65 IN | BODY MASS INDEX: 20.99 KG/M2 | WEIGHT: 126 LBS

## 2019-06-03 DIAGNOSIS — Z98.890 POST-OPERATIVE STATE: Primary | ICD-10-CM

## 2019-06-03 PROCEDURE — 99024 POSTOP FOLLOW-UP VISIT: CPT | Performed by: SURGERY

## 2019-06-04 ENCOUNTER — TELEPHONE (OUTPATIENT)
Dept: PLASTIC SURGERY | Facility: CLINIC | Age: 74
End: 2019-06-04

## 2019-06-07 ENCOUNTER — TELEPHONE (OUTPATIENT)
Dept: PLASTIC SURGERY | Facility: CLINIC | Age: 74
End: 2019-06-07

## 2019-06-10 ENCOUNTER — OFFICE VISIT (OUTPATIENT)
Dept: FAMILY MEDICINE CLINIC | Facility: CLINIC | Age: 74
End: 2019-06-10
Payer: COMMERCIAL

## 2019-06-10 VITALS
DIASTOLIC BLOOD PRESSURE: 60 MMHG | OXYGEN SATURATION: 97 % | RESPIRATION RATE: 16 BRPM | BODY MASS INDEX: 20.63 KG/M2 | TEMPERATURE: 96.5 F | SYSTOLIC BLOOD PRESSURE: 112 MMHG | HEART RATE: 81 BPM | WEIGHT: 124 LBS

## 2019-06-10 DIAGNOSIS — E03.9 ACQUIRED HYPOTHYROIDISM: ICD-10-CM

## 2019-06-10 DIAGNOSIS — C44.41 BASAL CELL CARCINOMA (BCC) OF SCALP: Primary | ICD-10-CM

## 2019-06-10 DIAGNOSIS — Z00.00 MEDICARE ANNUAL WELLNESS VISIT, SUBSEQUENT: ICD-10-CM

## 2019-06-10 DIAGNOSIS — K76.0 FATTY INFILTRATION OF LIVER: ICD-10-CM

## 2019-06-10 DIAGNOSIS — F32.3 MAJOR DEPRESSION WITH PSYCHOTIC FEATURES (HCC): ICD-10-CM

## 2019-06-10 DIAGNOSIS — E74.39 GLUCOSE INTOLERANCE: ICD-10-CM

## 2019-06-10 DIAGNOSIS — E78.00 PURE HYPERCHOLESTEROLEMIA: ICD-10-CM

## 2019-06-10 PROCEDURE — 99214 OFFICE O/P EST MOD 30 MIN: CPT | Performed by: FAMILY MEDICINE

## 2019-06-10 PROCEDURE — 1125F AMNT PAIN NOTED PAIN PRSNT: CPT | Performed by: FAMILY MEDICINE

## 2019-06-10 PROCEDURE — 1170F FXNL STATUS ASSESSED: CPT | Performed by: FAMILY MEDICINE

## 2019-06-10 PROCEDURE — G0439 PPPS, SUBSEQ VISIT: HCPCS | Performed by: FAMILY MEDICINE

## 2019-06-10 RX ORDER — ACETAMINOPHEN 500 MG
500 TABLET ORAL EVERY 8 HOURS PRN
COMMUNITY

## 2019-06-11 ENCOUNTER — OFFICE VISIT (OUTPATIENT)
Dept: SURGICAL ONCOLOGY | Facility: CLINIC | Age: 74
End: 2019-06-11

## 2019-06-11 VITALS
RESPIRATION RATE: 16 BRPM | WEIGHT: 124 LBS | HEIGHT: 65 IN | TEMPERATURE: 97 F | HEART RATE: 72 BPM | DIASTOLIC BLOOD PRESSURE: 82 MMHG | OXYGEN SATURATION: 98 % | BODY MASS INDEX: 20.66 KG/M2 | SYSTOLIC BLOOD PRESSURE: 118 MMHG

## 2019-06-11 DIAGNOSIS — C44.41 BASAL CELL CARCINOMA (BCC) OF SCALP: Primary | ICD-10-CM

## 2019-06-11 PROCEDURE — 99024 POSTOP FOLLOW-UP VISIT: CPT | Performed by: SURGERY

## 2019-06-12 ENCOUNTER — OFFICE VISIT (OUTPATIENT)
Dept: BEHAVIORAL/MENTAL HEALTH CLINIC | Facility: CLINIC | Age: 74
End: 2019-06-12
Payer: COMMERCIAL

## 2019-06-12 ENCOUNTER — TELEPHONE (OUTPATIENT)
Dept: PLASTIC SURGERY | Facility: CLINIC | Age: 74
End: 2019-06-12

## 2019-06-12 DIAGNOSIS — F32.3 MAJOR DEPRESSION WITH PSYCHOTIC FEATURES (HCC): Primary | ICD-10-CM

## 2019-06-12 PROCEDURE — 90834 PSYTX W PT 45 MINUTES: CPT | Performed by: SOCIAL WORKER

## 2019-06-17 ENCOUNTER — OFFICE VISIT (OUTPATIENT)
Dept: PLASTIC SURGERY | Facility: CLINIC | Age: 74
End: 2019-06-17

## 2019-06-17 DIAGNOSIS — C44.219 BASAL CELL CARCINOMA (BCC) OF SKIN OF LEFT EAR: ICD-10-CM

## 2019-06-17 DIAGNOSIS — C44.41 BASAL CELL CARCINOMA (BCC) OF SCALP: Primary | ICD-10-CM

## 2019-06-17 PROCEDURE — 99024 POSTOP FOLLOW-UP VISIT: CPT | Performed by: PHYSICIAN ASSISTANT

## 2019-07-01 DIAGNOSIS — Z12.11 COLON CANCER SCREENING: Primary | ICD-10-CM

## 2019-07-08 ENCOUNTER — OFFICE VISIT (OUTPATIENT)
Dept: PLASTIC SURGERY | Facility: CLINIC | Age: 74
End: 2019-07-08

## 2019-07-08 DIAGNOSIS — C44.41 BASAL CELL CARCINOMA (BCC) OF SCALP: Primary | ICD-10-CM

## 2019-07-08 DIAGNOSIS — C44.219 BASAL CELL CARCINOMA (BCC) OF SKIN OF LEFT EAR: ICD-10-CM

## 2019-07-08 PROCEDURE — 99024 POSTOP FOLLOW-UP VISIT: CPT | Performed by: PHYSICIAN ASSISTANT

## 2019-07-08 NOTE — PROGRESS NOTES
Assessment/Plan:   Carlos Barrera is a pleasant 75-year-old female who is 5 weeks status post reconstruction of basal cell carcinoma defect of the left temple, vertex of scalp, anterior scalp with split-thickness skin graft and left ear with full-thickness skin graft which was done in conjunction with Dr Jose G Umana  Please see HPI  Her scalp graft sites had thick eschars noted of which were debrided in the office  Silver nitrate was applied to the granulation tissue  She was instructed to cleanse the areas with soap and water daily and to leave them open to air  We will see her back in 2-4 weeks or sooner with any concerns  Diagnoses and all orders for this visit:    Basal cell carcinoma (BCC) of scalp    Basal cell carcinoma (BCC) of skin of left ear          Subjective:     Patient ID: Jacques Lundberg is a 68 y o  female  HPI   She reports drainage from her scalp graft sites  She denies any pain  She states that her donor site is healed  Review of Systems   Skin:        As per HPI  Objective:     Physical Exam   Skin:   Three scalp graft sites with thick eschar noted  The 2 sites on the crown of the scalp were debrided  Silver nitrate was applied to the underlying granulation tissue  Please see photos

## 2019-07-08 NOTE — LETTER
July 8, 2019     Heather Cortes MD  [address] [place] [address]    Patient: Manuel Nj   YOB: 1945   Date of Visit: 7/8/2019       Dear Dr Isabelle Cortes: Thank you for referring Tamanna Scott to me for evaluation  Below are my notes for this consultation  If you have questions, please do not hesitate to call me  I look forward to following your patient along with you  Sincerely,        Polina Amador PA-C        CC: MD Polina Nguyen PA-C  7/8/2019  4:26 PM  Sign at close encounter  Assessment/Plan:   Esa Dorsey is a pleasant 51-year-old female who is 5 weeks status post reconstruction of basal cell carcinoma defect of the left temple, vertex of scalp, anterior scalp with split-thickness skin graft and left ear with full-thickness skin graft which was done in conjunction with Dr Bebeto Rodriguez  Please see HPI  Her scalp graft sites had thick eschars noted of which were debrided in the office  Silver nitrate was applied to the granulation tissue  She was instructed to cleanse the areas with soap and water daily and to leave them open to air  We will see her back in 2-4 weeks or sooner with any concerns  Diagnoses and all orders for this visit:    Basal cell carcinoma (BCC) of scalp    Basal cell carcinoma (BCC) of skin of left ear          Subjective:     Patient ID: Manuel Nj is a 68 y o  female  HPI   She reports drainage from her scalp graft sites  She denies any pain  She states that her donor site is healed  Review of Systems   Skin:        As per HPI  Objective:     Physical Exam   Skin:   Three scalp graft sites with thick eschar noted  The 2 sites on the crown of the scalp were debrided  Silver nitrate was applied to the underlying granulation tissue  Please see photos

## 2019-07-09 ENCOUNTER — SOCIAL WORK (OUTPATIENT)
Dept: BEHAVIORAL/MENTAL HEALTH CLINIC | Facility: CLINIC | Age: 74
End: 2019-07-09
Payer: COMMERCIAL

## 2019-07-09 DIAGNOSIS — F32.3 MAJOR DEPRESSION WITH PSYCHOTIC FEATURES (HCC): Primary | ICD-10-CM

## 2019-07-09 PROCEDURE — 90834 PSYTX W PT 45 MINUTES: CPT | Performed by: SOCIAL WORKER

## 2019-07-22 DIAGNOSIS — F32.3 MAJOR DEPRESSION WITH PSYCHOTIC FEATURES (HCC): ICD-10-CM

## 2019-07-22 RX ORDER — OLANZAPINE 2.5 MG/1
2.5 TABLET ORAL
Qty: 90 TABLET | Refills: 2 | Status: SHIPPED | OUTPATIENT
Start: 2019-07-22 | End: 2019-08-09 | Stop reason: SDUPTHER

## 2019-08-05 ENCOUNTER — OFFICE VISIT (OUTPATIENT)
Dept: PLASTIC SURGERY | Facility: CLINIC | Age: 74
End: 2019-08-05

## 2019-08-05 DIAGNOSIS — C44.41 BASAL CELL CARCINOMA (BCC) OF SCALP: Primary | ICD-10-CM

## 2019-08-05 PROCEDURE — 99024 POSTOP FOLLOW-UP VISIT: CPT | Performed by: PHYSICIAN ASSISTANT

## 2019-08-05 NOTE — LETTER
August 5, 2019     Shakeel Marie MD  [address] [place] [address]    Patient: Lynne Blackburn   YOB: 1945   Date of Visit: 8/5/2019       Dear Dr Cherelle Marie: Thank you for referring Ji Pérez to me for evaluation  Below are my notes for this consultation  If you have questions, please do not hesitate to call me  I look forward to following your patient along with you  Sincerely,        Yuliet Madrid PA-C        CC: MD uYliet Judd PA-C  8/5/2019  4:25 PM  Sign at close encounter  Assessment/Plan:   Marcela Hoffman is a pleasant 60-year-old female who is 2 months status post reconstruction of basal cell carcinoma defect of the left temple, vertex of scalp, anterior scalp with split-thickness skin graft and left ear with full-thickness skin graft which was done in conjunction with Dr Carnes   Please see HPI  Posterior scalp graft site was debrided and silver nitrate applied  She will continue washing the areas daily  We will see her back in approximately 4 weeks  Diagnoses and all orders for this visit:    Basal cell carcinoma (BCC) of scalp          Subjective:     Patient ID: Lynne Blackburn is a 68 y o  female  HPI   She reports having some drainage from the scalp  Otherwise she denies any complaints  Review of Systems   Skin:        As per HPI  Objective:     Physical Exam   Skin:   Head anterior scalp graft site is healing well  There to eschars noted  The posterior graft site had a thin eschar noted  This was debrided and silver nitrate applied to the granulation bed  Left ear graft site with a small area of granulation tissue noted  Silver nitrate was applied here as well  Left temple is healing well with a thin eschar noted  Please see photos

## 2019-08-05 NOTE — PROGRESS NOTES
Assessment/Plan:   Shanika Moreno is a pleasant 80-year-old female who is 2 months status post reconstruction of basal cell carcinoma defect of the left temple, vertex of scalp, anterior scalp with split-thickness skin graft and left ear with full-thickness skin graft which was done in conjunction with Dr Carnes   Please see HPI  Posterior scalp graft site was debrided and silver nitrate applied  She will continue washing the areas daily  We will see her back in approximately 4 weeks  Diagnoses and all orders for this visit:    Basal cell carcinoma (BCC) of scalp          Subjective:     Patient ID: Andrew Carlin is a 68 y o  female  HPI   She reports having some drainage from the scalp  Otherwise she denies any complaints  Review of Systems   Skin:        As per HPI  Objective:     Physical Exam   Skin:   Head anterior scalp graft site is healing well  There to eschars noted  The posterior graft site had a thin eschar noted  This was debrided and silver nitrate applied to the granulation bed  Left ear graft site with a small area of granulation tissue noted  Silver nitrate was applied here as well  Left temple is healing well with a thin eschar noted  Please see photos

## 2019-08-09 ENCOUNTER — OFFICE VISIT (OUTPATIENT)
Dept: PSYCHIATRY | Facility: CLINIC | Age: 74
End: 2019-08-09
Payer: COMMERCIAL

## 2019-08-09 ENCOUNTER — SOCIAL WORK (OUTPATIENT)
Dept: BEHAVIORAL/MENTAL HEALTH CLINIC | Facility: CLINIC | Age: 74
End: 2019-08-09
Payer: COMMERCIAL

## 2019-08-09 DIAGNOSIS — F32.3 MAJOR DEPRESSION WITH PSYCHOTIC FEATURES (HCC): Primary | ICD-10-CM

## 2019-08-09 DIAGNOSIS — F43.21 GRIEF: Primary | ICD-10-CM

## 2019-08-09 DIAGNOSIS — F32.3 MAJOR DEPRESSION WITH PSYCHOTIC FEATURES (HCC): ICD-10-CM

## 2019-08-09 PROCEDURE — 90834 PSYTX W PT 45 MINUTES: CPT | Performed by: SOCIAL WORKER

## 2019-08-09 PROCEDURE — 99214 OFFICE O/P EST MOD 30 MIN: CPT | Performed by: PSYCHIATRY & NEUROLOGY

## 2019-08-09 RX ORDER — OLANZAPINE 2.5 MG/1
2.5 TABLET ORAL
Qty: 90 TABLET | Refills: 2 | Status: SHIPPED | OUTPATIENT
Start: 2019-08-09 | End: 2019-12-13 | Stop reason: SDUPTHER

## 2019-08-09 NOTE — PSYCH
Assessment/Plan:      Diagnoses and all orders for this visit:    Major depression with psychotic features ([facility] )          Subjective:     Patient ID: Tyree Simpson is a 68 y o  female  Pt counseled for 50 minutes from 9:50 to 8:40am   Pt continuing meds and feels they help with sleep - sleeping well, eating "ok "   Managing mail/bills/ussies that come - feels more organized  Would like to go to [place] and bring car down but feels bad asking son to help  Pt reports feeling weepy about Angélica Cramp at times, vivid dreams which often happens before depression  Also feels like dropping things more often - will talk with psychiatrist today about it  Allergies and heat impact  Packing books up and donating a few boxes a week  In September, planning for family gathering/memorial at pt's house- recent positive visit with son, Carola Schwartz and grandson  Discussed depression management - recognizing trigger/ssigns like dreams but also realizing that it doesn't mean it will definietly happen/keeping things in perspective/preventing mood from declining  Grieving and missing  discussed too  Death on 2/2/19  Birthday in September  Agrees that increased socialization will be good once her cancer site heal more      HPI    Review of Systems      Objective:     Physical Exam  oriented, engaged, calm, full range affect, good eye contact, appropriate speech, denies suicidal/homicidal ideations/psychosis, fair insight/judgement

## 2019-08-09 NOTE — PSYCH
Subjective:     Patient ID: Tracy Queen is a 68 y o  female with MDD presenting for a follow up visit  She is on zoloft 25mg and olanzapine 2 5mg  HPI ROS Appetite Changes and Sleep: The patient stated that she has had surgery on May 29th for the skin excision on the scalp and three skin grafts  She has now had the cancer removed  She has also had another BC skin lesion on her leg which was removed  Her ear is painful but she is okay with the pain and the discomfort  Her mood has been positive about this whole ordeal  She has looked online about it and knows that she is more prone to more lesions and possibly melanoma  The doctor stated that it is healing well but slowly  She wants to go to the family house in [place]  She is in good spirits  She is sleeping well but when it was very hot, she was sweating during the heat waves due to lack of air conditioning on the second floor  She endorsed that she is taking the sertraline with dinner and olanzapine at bedtime  She is getting tired with the heat  She is finding the olanzapine makes her tired in the morning which limits her activity until 1030-11am  She does more things in the afternoon and evening  She has been having forgetful of late  In the last three weeks, she gets "weepy and crying about missing Kam Chester " Her mood is otherwise improved  Auditory processing has always been an issue for her, so this is not a new phenomenon  Appetite is good with increase in her weight which she is happy about since she had lost weight in the past  She is having some blurry vision when she is reading and this corrects when she moves her glasses up a little  She denied SI/HI  She is in contact with her children and they are also doing well  Denied AVH      Review Of Systems:     Mood Depression and Emotional Lability   Behavior Normal    Thought Content Normal   General Emotional Problems and Decreased Functioning   Personality Normal   Other Psych Symptoms Normal Constitutional As Noted in HPI   ENT Sore Throat and tinnitus   Cardiovascular Negative   Respiratory Negative   Gastrointestinal Negative   Genitourinary Negative   Musculoskeletal Negative   Integumentary Skin Wound   Neurological As Noted in HPI   Endocrine Normal    Other Symptoms Normal              Laboratory Results: No results found for this or any previous visit  Substance Abuse History:  Social History     Substance and Sexual Activity   Drug Use No       Family Psychiatric History:   Family History   Adopted: Yes   Problem Relation Age of Onset   Mollie Sizer ALS Mother     Other Mother         Gastrointestinal bleeding    Other Father         Gastrointestinal bleeding    Alcohol abuse Father     Colon cancer Maternal Aunt     Psychosis Cousin        The following portions of the patient's history were reviewed and updated as appropriate: allergies, current medications, past family history, past medical history, past social history, past surgical history and problem list     Social History     Socioeconomic History    Marital status:      Spouse name: Not on file    Number of children: 3    Years of education: 12    Highest education level:  Bachelor's degree (e g , BA, AB, BS)   Occupational History    Occupation: retired   Social Needs    Financial resource strain: Not on file    Food insecurity:     Worry: Not on file     Inability: Not on file   PerioSeal needs:     Medical: Not on file     Non-medical: Not on file   Tobacco Use    Smoking status: Former Smoker    Smokeless tobacco: Never Used    Tobacco comment: quit in 1968   Substance and Sexual Activity    Alcohol use: Not Currently    Drug use: No    Sexual activity: Not Currently   Lifestyle    Physical activity:     Days per week: Not on file     Minutes per session: Not on file    Stress: Not on file   Relationships    Social connections:     Talks on phone: Not on file     Gets together: Not on file     Attends Sikh service: Not on file     Active member of club or organization: Not on file     Attends meetings of clubs or organizations: Not on file     Relationship status: Not on file    Intimate partner violence:     Fear of current or ex partner: Not on file     Emotionally abused: Not on file     Physically abused: Not on file     Forced sexual activity: Not on file   Other Topics Concern    Not on file   Social History Narrative    Not on file     Social History     Social History Narrative    Not on file       Objective:       Mental status:  Appearance calm and cooperative , adequate hygiene and grooming and good eye contact    Mood "odd but pretty good considering the circumstances"   Affect affect was broad   Speech a normal rate   Thought Processes coherent/organized and normal thought processes   Hallucinations no hallucinations present    Thought Content no delusions   Abnormal Thoughts no suicidal thoughts  and no homicidal thoughts    Orientation  oriented to person and place and time   Remote Memory short term memory intact and long term memory intact   Attention Span concentration intact   Intellect Appears to be of Average Intelligence   Insight Insight intact   Judgement judgment was intact   Muscle Strength Muscle strength and tone were normal and Normal gait    Language no difficulty naming common objects, no difficulty repeating a phrase  and no difficulty writing a sentence    Fund of Knowledge displays adequate knowledge of current events, adequate fund of knowledge regarding past history and adequate fund of knowledge regarding vocabulary    Pain none   Pain Scale 0       Assessment/Plan:       Diagnoses and all orders for this visit:    Major depression with psychotic features ([facility] [address] [address] )  -     OLANZapine (ZyPREXA) 2 5 mg tablet; Take 1 tablet (2 5 mg total) by mouth daily at bedtime for 90 days  -     sertraline (ZOLOFT) 50 mg tablet;  Take 1 tablet (50 mg total) by mouth daily        [facility] a [facility]    Follow up in 3 months    Treatment Recommendations- Risks Benefits      Immediate Medical/Psychiatric/Psychotherapy Treatments and Any Precautions: Arlin Marin has been doing well with her mood overall but still having some emotional lability and a subjective increase in OCD symptoms with double checking things  She is willing to increase dose of sertraline  Continue same dose of olanzapine       Risks, Benefits And Possible Side Effects Of Medications:  PSYCH RISK, BENEFITS AND POSSIBLE SIDE EFFECTS discussed and voiced unerstanding    Controlled Medication Discussion: No records found for controlled prescriptions according to Steve Brown

## 2019-08-09 NOTE — BH TREATMENT PLAN
TREATMENT PLAN (Medication Management Only)        [facility]    Name and Date of Birth:  Rhonda Banks 68 y o  1945  Date of Treatment Plan: August 9, 2019  Diagnosis/Diagnoses:    1  Grief    2  Major depression with psychotic features [facility])      Strengths/Personal Resources for Self-Care: "can laugh sometimes about my problems, organized, persistent, follow through, never get bored, glad to learn new things", supportive family, taking medications as prescribed, ability to communicate well, ability to understand psychiatric illness, average or above intelligence, sense of humor, well educated, willingness to work on problems  Area/Areas of need (in own words): "need to do meditation regularly, OCD, worrying unecessarily, knowing what to worry about, not very social"  1  Long Term Goal: "less depression, anxiety, grief, recognizing acceptring after husbands deth 2/2/2019"  Target Date: 2 months - 10/9/2019  Person/Persons responsible for completion of goal: Waqar Crisostomo, Dr Loni Curm  2  Short Term Objective (s) - How will we reach this goal?:   A  Provider new recommended medication/dosage changes and/or continue medication(s): Medication changes: I have changed Gladys Carlos Piper's sertraline  I am also having her maintain her acetaminophen and OLANZapine     B  N/A   C  N/A  Target Date: 3 months - 11/9/2019  Person/Persons Responsible for Completion of Goal: Waqar Crisostomo, Dr Jersey Acosta Goals: continuing treatment  Treatment Modality: medication management every 3 months  Review due 90 to 120 days from date of this plan: 3 months - 11/9/2019  Expected length of service: ongoing treatment  My Physician/PA/NP and I have developed this plan together and I agree to work on the goals and objectives  I understand the treatment goals that were developed for my treatment

## 2019-08-09 NOTE — PATIENT INSTRUCTIONS
Diagnoses and all orders for this visit:    Major depression with psychotic features ([facility] [address] )  -     OLANZapine (ZyPREXA) 2 5 mg tablet; Take 1 tablet (2 5 mg total) by mouth daily at bedtime for 90 days  -     sertraline (ZOLOFT) 50 mg tablet;  Take 1 tablet (50 mg total) by mouth daily

## 2019-09-09 ENCOUNTER — OFFICE VISIT (OUTPATIENT)
Dept: PLASTIC SURGERY | Facility: CLINIC | Age: 74
End: 2019-09-09

## 2019-09-09 DIAGNOSIS — C44.41 BASAL CELL CARCINOMA (BCC) OF SCALP: Primary | ICD-10-CM

## 2019-09-09 DIAGNOSIS — C44.219 BASAL CELL CARCINOMA (BCC) OF SKIN OF LEFT EAR: ICD-10-CM

## 2019-09-09 PROCEDURE — 99024 POSTOP FOLLOW-UP VISIT: CPT | Performed by: PHYSICIAN ASSISTANT

## 2019-09-09 NOTE — LETTER
September 9, 2019     Ross Gómez MD  [address] [place] [id number]    Patient: Edward De Leon   YOB: 1945   Date of Visit: 9/9/2019       Dear Dr Ran Gómez: Thank you for referring Janice Velasco to me for evaluation  Below are my notes for this consultation  If you have questions, please do not hesitate to call me  I look forward to following your patient along with you  Sincerely,        Fide Chandler PA-C        CC: MD Fide Caraballo PA-C  9/9/2019  1:40 PM  Sign at close encounter  Assessment/Plan:   Niyah Mobley is a pleasant 15-year-old female who is  3 months status post reconstruction of basal cell carcinoma defect of the left temple, vertex of scalp, anterior scalp with split-thickness skin graft and left ear with full-thickness skin graft which was done in conjunction with Dr Carnes   Please see HPI   The scalp is well healed  I applied silver nitrate to the posterior ear  Follow up in 4-6 weeks  Diagnoses and all orders for this visit:    Basal cell carcinoma (BCC) of scalp    Basal cell carcinoma (BCC) of skin of left ear          Subjective:     Patient ID: Edward De Leon is a 68 y o  female  HPI   She reports that her scalp is healed and she stil has some mild drainage from behind the right ear  Review of Systems   Skin:        As per HPI  Objective:     Physical Exam   Skin:   Scalp graft sites are well healed at this point  Posterior right ear with a pinhole opening with drainage noted  Silver nitrate was applied

## 2019-09-09 NOTE — PROGRESS NOTES
Assessment/Plan:   Rossana Villegas is a pleasant 59-year-old female who is 3 months status post reconstruction of basal cell carcinoma defect of the left temple, vertex of scalp, anterior scalp with split-thickness skin graft and left ear with full-thickness skin graft which was done in conjunction with Dr Carnes   Please see HPI   The scalp is well healed  I applied silver nitrate to the posterior ear  Follow up in 4-6 weeks  Diagnoses and all orders for this visit:    Basal cell carcinoma (BCC) of scalp    Basal cell carcinoma (BCC) of skin of left ear          Subjective:     Patient ID: Chelsi Reyes is a 68 y o  female  HPI   She reports that her scalp is healed and she stil has some mild drainage from behind the right ear  Review of Systems   Skin:        As per HPI  Objective:     Physical Exam   Skin:   Scalp graft sites are well healed at this point  Posterior right ear with a pinhole opening with drainage noted  Silver nitrate was applied

## 2019-09-13 ENCOUNTER — SOCIAL WORK (OUTPATIENT)
Dept: BEHAVIORAL/MENTAL HEALTH CLINIC | Facility: CLINIC | Age: 74
End: 2019-09-13
Payer: COMMERCIAL

## 2019-09-13 DIAGNOSIS — F32.3 MAJOR DEPRESSION WITH PSYCHOTIC FEATURES (HCC): Primary | ICD-10-CM

## 2019-09-13 PROCEDURE — 90834 PSYTX W PT 45 MINUTES: CPT | Performed by: SOCIAL WORKER

## 2019-09-13 NOTE — PSYCH
Assessment/Plan:      Diagnoses and all orders for this visit:    Major depression with psychotic features ([facility] [address] )          Subjective:     Patient ID: Dandy Riley is a 68 y o  female  Pt counseled for 50 minutes from 1:00pm to 1:50pm   Pt had another spot removed that turned out cancerous - another precancerous spot taken care of  October 2nd - another surgery for other spot on head - 3-5hrs in office surgery - awake  Positive attitude but feels may have more cancerous spots  Constant letters/bills/calls related to accounts for Gloria Souza - brings up his death/sadness but charlee by completing tasks  Postponing memorial for Donell Banks due to nataliya having hip replacement surgery/unabel to fly  Was having some depression so psych increased Zoloft to 50mg - feels a little more fatigue and "less sharp" but feels less depressed  Feels more forgetful  Got books on teaching self Hungarian - doing for an hour daily  Reading daily as well  Discussed first year and holidays without   Continues to sort through house and get rid of things which feels positive  Coping, grief, managing anxiety and asking for help as needed discussed      Sons continue to call patient regularly - will bring car down to home in [place] with son after surgery     HPI    Review of Systems      Objective:     Physical Exam  oriented, engaged, calm/happy, full range affect, good eye contact, appropriate speech, denies suicidal/homicial ideations/psychosis, fair insight/judgement

## 2019-10-18 ENCOUNTER — SOCIAL WORK (OUTPATIENT)
Dept: BEHAVIORAL/MENTAL HEALTH CLINIC | Facility: CLINIC | Age: 74
End: 2019-10-18
Payer: COMMERCIAL

## 2019-10-18 DIAGNOSIS — F33.41 RECURRENT MAJOR DEPRESSIVE DISORDER, IN PARTIAL REMISSION (HCC): Primary | ICD-10-CM

## 2019-10-18 PROCEDURE — 90832 PSYTX W PT 30 MINUTES: CPT | Performed by: SOCIAL WORKER

## 2019-10-18 NOTE — PSYCH
Assessment/Plan:      Diagnoses and all orders for this visit:    Recurrent major depressive disorder, in partial remission ([facility] [address] )          Subjective:     Patient ID: Ishan Caal is a 76 y o  female  Pt counseled for 30 minutes from 1:00 - 1:30pm    More Cancer surgery on head - 10/2 - stitches out on Wednesday - open hole - has to keep covered and vaseline  Going back for ear/still in pain  Sleep difficult due to positioning  Meds continue to help with OCD thoughts about germs/wounds, etc   Not excessively washing hands or worrying  Shredding paperwork from /'s mom - continuing to go through things although difficult at times  Feels accomplishing things  Feels handling things well  Not increasing socializing due to surgeries  Getting house ready to sell - going through a lot and getting easier  Miguelina Castro, son still Charley for [employer] - maybe [id number] or Javier  Planning move to [place] by summer 2020  Discussed grief, depression management, self care and preventing relapse  Pt's mental health has improved dramatically and she feels ongoing therapy no longer needed        HPI    Review of Systems      Objective:     Physical Exam  oriented, engaged, calm/happy, full range affect, good eye contact, appropriate speech, denies suicidal/homicidal ideations/psychosis, good insight/judgement

## 2019-10-21 ENCOUNTER — OFFICE VISIT (OUTPATIENT)
Dept: PLASTIC SURGERY | Facility: CLINIC | Age: 74
End: 2019-10-21
Payer: COMMERCIAL

## 2019-10-21 DIAGNOSIS — C44.219 BASAL CELL CARCINOMA (BCC) OF SKIN OF LEFT EAR: ICD-10-CM

## 2019-10-21 DIAGNOSIS — C44.41 BASAL CELL CARCINOMA (BCC) OF SCALP: Primary | ICD-10-CM

## 2019-10-21 PROCEDURE — 99212 OFFICE O/P EST SF 10 MIN: CPT | Performed by: PHYSICIAN ASSISTANT

## 2019-10-21 NOTE — LETTER
October 21, 2019     Jennifer Aragon MD  [address] [place] [address]    Patient: Ama Medrano   YOB: 1945   Date of Visit: 10/21/2019       Dear Dr Shandra Aragon: Thank you for referring Efra Humprheys to me for evaluation  Below are my notes for this consultation  If you have questions, please do not hesitate to call me  I look forward to following your patient along with you  Sincerely,        Shanita Andrew PA-C        CC: MD Shanita Nair PA-C  10/21/2019  1:51 PM  Sign at close encounter  Assessment/Plan:   [facility] is a pleasant 77-year-old female who is 5 months status post reconstruction of basal cell carcinoma defect of the left temple, vertex of scalp, anterior scalp with split-thickness skin graft and left ear with full-thickness skin graft which was done in conjunction with Dr Carnes   Please see HPI   The left posterior ear and anti helix of the ear with granulation tissue noted  I applied silver nitrate to both areas  We will see her back in approximately 4-6 weeks or sooner with any concerns  Diagnoses and all orders for this visit:    Basal cell carcinoma (BCC) of scalp    Basal cell carcinoma (BCC) of skin of left ear          Subjective:     Patient ID: Ama Medrano is a 76 y o  female  HPI   She reports that she recently had a Mohs procedure on the right side of her scalp  She is otherwise well and reports some mild drainage from the left ear  Review of Systems   Skin:        As per HPI  Objective:     Physical Exam   Skin:   Left posterior ear and anti helix of left ear both with a small amount of granulation tissue noted  This was cauterized with silver nitrate

## 2019-10-21 NOTE — PROGRESS NOTES
Assessment/Plan:   Neris Grubbs is a pleasant 80-year-old female who is 5 months status post reconstruction of basal cell carcinoma defect of the left temple, vertex of scalp, anterior scalp with split-thickness skin graft and left ear with full-thickness skin graft which was done in conjunction with Dr Carnes   Please see HPI   The left posterior ear and anti helix of the ear with granulation tissue noted  I applied silver nitrate to both areas  We will see her back in approximately 4-6 weeks or sooner with any concerns  Diagnoses and all orders for this visit:    Basal cell carcinoma (BCC) of scalp    Basal cell carcinoma (BCC) of skin of left ear          Subjective:     Patient ID: Mine Westbrook is a 76 y o  female  HPI   She reports that she recently had a Mohs procedure on the right side of her scalp  She is otherwise well and reports some mild drainage from the left ear  Review of Systems   Skin:        As per HPI  Objective:     Physical Exam   Skin:   Left posterior ear and anti helix of left ear both with a small amount of granulation tissue noted  This was cauterized with silver nitrate

## 2019-10-28 DIAGNOSIS — F32.3 MAJOR DEPRESSION WITH PSYCHOTIC FEATURES (HCC): ICD-10-CM

## 2019-10-29 RX ORDER — OLANZAPINE 2.5 MG/1
2.5 TABLET ORAL
Qty: 90 TABLET | Refills: 2 | OUTPATIENT
Start: 2019-10-29 | End: 2020-01-27

## 2019-11-05 DIAGNOSIS — F32.3 MAJOR DEPRESSION WITH PSYCHOTIC FEATURES (HCC): ICD-10-CM

## 2019-12-02 ENCOUNTER — OFFICE VISIT (OUTPATIENT)
Dept: PLASTIC SURGERY | Facility: CLINIC | Age: 74
End: 2019-12-02
Payer: COMMERCIAL

## 2019-12-02 DIAGNOSIS — Z85.828 HISTORY OF BASAL CELL CARCINOMA: Primary | ICD-10-CM

## 2019-12-02 PROCEDURE — 99212 OFFICE O/P EST SF 10 MIN: CPT | Performed by: PHYSICIAN ASSISTANT

## 2019-12-02 NOTE — PROGRESS NOTES
Assessment/Plan:   Marina Perez is a pleasant 77-year-old female who is status post reconstruction of basal cell carcinoma defect of the left temple, vertex of scalp, anterior scalp with split-thickness skin graft and left ear with full-thickness skin graft which was done in conjunction with Dr Carnes on 5/29/19   Please see HPI  The left ear graft site has eroded and there is a hole  This may have been a result of poor healing and the arm of her glasses rubbing  She is not concerned about it at this time  All areas are well healed  I encouraged her to follow up if she would like the area repaired  Diagnoses and all orders for this visit:    History of basal cell carcinoma          Subjective:     Patient ID: Adriel Juarez is a 76 y o  female  HPI   She reports all of her surgical sites have healed up and she no longer has any drainage  Review of Systems   Skin:        As per HPI  Objective:     Physical Exam   Skin:   All surgical sites are healed well  The left helix of ear has a hole noted in the area of prior healing issues  This is now well healed  This area is not bothersome to her  Please see photo

## 2019-12-06 ENCOUNTER — APPOINTMENT (OUTPATIENT)
Dept: LAB | Facility: CLINIC | Age: 74
End: 2019-12-06
Payer: COMMERCIAL

## 2019-12-06 DIAGNOSIS — E55.9 VITAMIN D DEFICIENCY: ICD-10-CM

## 2019-12-06 DIAGNOSIS — E78.00 PURE HYPERCHOLESTEROLEMIA: ICD-10-CM

## 2019-12-06 LAB
25(OH)D3 SERPL-MCNC: 27.3 NG/ML (ref 30–100)
CHOLEST SERPL-MCNC: 242 MG/DL (ref 50–200)
HDLC SERPL-MCNC: 72 MG/DL
LDLC SERPL CALC-MCNC: 143 MG/DL (ref 0–100)
TRIGL SERPL-MCNC: 137 MG/DL

## 2019-12-06 PROCEDURE — 82306 VITAMIN D 25 HYDROXY: CPT

## 2019-12-06 PROCEDURE — 36415 COLL VENOUS BLD VENIPUNCTURE: CPT

## 2019-12-06 PROCEDURE — 80061 LIPID PANEL: CPT

## 2019-12-11 ENCOUNTER — OFFICE VISIT (OUTPATIENT)
Dept: FAMILY MEDICINE CLINIC | Facility: CLINIC | Age: 74
End: 2019-12-11
Payer: COMMERCIAL

## 2019-12-11 VITALS
RESPIRATION RATE: 16 BRPM | DIASTOLIC BLOOD PRESSURE: 64 MMHG | TEMPERATURE: 97.3 F | WEIGHT: 142 LBS | SYSTOLIC BLOOD PRESSURE: 118 MMHG | HEART RATE: 83 BPM | BODY MASS INDEX: 23.63 KG/M2 | OXYGEN SATURATION: 97 %

## 2019-12-11 DIAGNOSIS — F33.41 RECURRENT MAJOR DEPRESSIVE DISORDER, IN PARTIAL REMISSION (HCC): ICD-10-CM

## 2019-12-11 DIAGNOSIS — E74.39 GLUCOSE INTOLERANCE: ICD-10-CM

## 2019-12-11 DIAGNOSIS — C44.41 BASAL CELL CARCINOMA (BCC) OF SCALP: ICD-10-CM

## 2019-12-11 DIAGNOSIS — E55.9 VITAMIN D DEFICIENCY: ICD-10-CM

## 2019-12-11 DIAGNOSIS — F42.2 MIXED OBSESSIONAL THOUGHTS AND ACTS: ICD-10-CM

## 2019-12-11 DIAGNOSIS — E78.00 PURE HYPERCHOLESTEROLEMIA: Primary | ICD-10-CM

## 2019-12-11 DIAGNOSIS — E03.9 ACQUIRED HYPOTHYROIDISM: ICD-10-CM

## 2019-12-11 PROBLEM — R15.1 FECAL SMEARING: Status: RESOLVED | Noted: 2019-03-04 | Resolved: 2019-12-11

## 2019-12-11 PROCEDURE — 99214 OFFICE O/P EST MOD 30 MIN: CPT | Performed by: FAMILY MEDICINE

## 2019-12-11 NOTE — ASSESSMENT & PLAN NOTE
Lab Results   Component Value Date    CHOLESTEROL 242 (H) 12/06/2019    HDL 72 12/06/2019    LDLCALC 143 (H) 12/06/2019    TRIG 137 12/06/2019     The 10-year ASCVD risk score (Jasmine Ratliff et al , 2013) is: 23 1%    Values used to calculate the score:      Age: 76 years      Sex: Female      Is Non- : No      Diabetic: Yes      Tobacco smoker: No      Systolic Blood Pressure: 113 mmHg      Is BP treated: No      HDL Cholesterol: 72 mg/dL      Total Cholesterol: 242 mg/dL    Reviewed healthy, low cholesterol diet  Pt declined statins again today, will consider it at 6 months

## 2019-12-11 NOTE — PROGRESS NOTES
FAMILY MEDICINE PROGRESS NOTE  Rich Machado 76 y o  female   DATE: December 11, 2019     ASSESSMENT and PLAN:  Rich Machado is a 76 y o  female with:     Vitamin D deficiency  Vit D level 27 3, restart Vit D OTC 2000 IU daily    Acquired hypothyroidism  Lab Results   Component Value Date    FWM0SCWKLEQA 2 240 04/25/2019     Normal off Synthroid    Glucose intolerance  Lab Results   Component Value Date    HGBA1C 5 9 05/08/2019     No longer on meds, pt states she was T2DM at some point  Recheck levels in 6 months    Basal cell carcinoma (BCC) of scalp  Being followed by Dr Edward Wiley, Dr Neris Townsend and Dr Gloria Gabriel and has multiple lesions that have been grafted and most recently had Mohs of her right scalp    Recurrent major depressive disorder, in partial remission ([facility] [address] )  MDD, Anxiety and OCD have improved greatly since starting Zoloft 50mg and Zyprexa 2 5mg  Has established with Psych (Dr Augie Ernst) and does do CBT    Pure hypercholesterolemia  Lab Results   Component Value Date    CHOLESTEROL 242 (H) 12/06/2019    HDL 72 12/06/2019    LDLCALC 143 (H) 12/06/2019    TRIG 137 12/06/2019     The 10-year ASCVD risk score (Alix Vickers et al , 2013) is: 23 1%    Values used to calculate the score:      Age: 76 years      Sex: Female      Is Non- : No      Diabetic: Yes      Tobacco smoker: No      Systolic Blood Pressure: 748 mmHg      Is BP treated: No      HDL Cholesterol: 72 mg/dL      Total Cholesterol: 242 mg/dL    Reviewed healthy, low cholesterol diet  Pt declined statins again today    RTC 6 months for MAWV and f/u    SUBJECTIVE:  Rich Machado is a 76 y o  female who presents today with a chief complaint of Follow-up (6 months)  Rich Machado is here for a 6 month follow-up, she did have labs done prior to this visit  The active chronic medical problems and medications are as below:   1  HLD- uncontrolled, recent labs abnormal as below  2   Vit D def- 27, not taking supplements  3  Basal cell cancers- f/w Dr Michi Cohn and Dr Souleymane Waite and has had multiple removals, most recently Moh's surgery  4  MDD and OCD- f/w Psych, now on Zyprexa and higher dose of Zoloft 50mg; doing much better      Review of Systems   Skin: Negative for pallor and wound  Psychiatric/Behavioral: Negative for dysphoric mood and sleep disturbance  The patient is not nervous/anxious and is not hyperactive  I have reviewed the patient's Past Medical History  Current Outpatient Medications:     acetaminophen (TYLENOL) 500 mg tablet, Take 500 mg by mouth every 8 (eight) hours as needed for mild pain, Disp: , Rfl:     OLANZapine (ZyPREXA) 2 5 mg tablet, Take 1 tablet (2 5 mg total) by mouth daily at bedtime for 90 days, Disp: 90 tablet, Rfl: 2    sertraline (ZOLOFT) 50 mg tablet, Take 1 tablet (50 mg total) by mouth daily, Disp: 90 tablet, Rfl: 2    OBJECTIVE:  /64   Pulse 83   Temp (!) 97 3 °F (36 3 °C)   Resp 16   Wt 64 4 kg (142 lb)   SpO2 97%   BMI 23 63 kg/m²    Physical Exam   Constitutional: She is oriented to person, place, and time  She appears well-developed and well-nourished  No distress  Cardiovascular: Normal rate, regular rhythm and normal heart sounds  No murmur heard  Pulmonary/Chest: Effort normal and breath sounds normal  No respiratory distress  She has no wheezes  She has no rales  Neurological: She is alert and oriented to person, place, and time  Skin: She is not diaphoretic  Psychiatric: She has a normal mood and affect  Vitals reviewed  Prerna Randall MD    Note: Portions of the record have been created with voice recognition software  Occasional wrong word or "sound a like" substitutions may have occurred due to the inherent limitations of voice recognition software  Read the chart carefully and recognize, using context, where substitutions have occurred

## 2019-12-11 NOTE — ASSESSMENT & PLAN NOTE
MDD, Anxiety and OCD have improved greatly since starting Zoloft 50mg and Zyprexa 2 5mg  Has established with Psych (Dr Dariana Almazan) and does do CBT

## 2019-12-11 NOTE — ASSESSMENT & PLAN NOTE
Lab Results   Component Value Date    HGBA1C 5 9 05/08/2019     No longer on meds, pt states she was T2DM at some point  Recheck levels in 6 months

## 2019-12-11 NOTE — ASSESSMENT & PLAN NOTE
Being followed by Dr Carmella Matias, Dr Andria Berman and Dr Lis Frye and has multiple lesions that have been grafted and most recently had Mohs of her right scalp

## 2019-12-12 ENCOUNTER — OFFICE VISIT (OUTPATIENT)
Dept: SURGICAL ONCOLOGY | Facility: CLINIC | Age: 74
End: 2019-12-12
Payer: COMMERCIAL

## 2019-12-12 VITALS
RESPIRATION RATE: 16 BRPM | HEIGHT: 65 IN | SYSTOLIC BLOOD PRESSURE: 130 MMHG | TEMPERATURE: 98.7 F | BODY MASS INDEX: 23.66 KG/M2 | WEIGHT: 142 LBS | DIASTOLIC BLOOD PRESSURE: 80 MMHG | HEART RATE: 77 BPM

## 2019-12-12 DIAGNOSIS — C44.41 BASAL CELL CARCINOMA (BCC) OF SCALP: Primary | ICD-10-CM

## 2019-12-12 DIAGNOSIS — C44.219 BASAL CELL CARCINOMA (BCC) OF SKIN OF LEFT EAR: ICD-10-CM

## 2019-12-12 DIAGNOSIS — C44.319 BASAL CELL CARCINOMA (BCC) OF LEFT TEMPLE REGION: ICD-10-CM

## 2019-12-12 PROCEDURE — 99213 OFFICE O/P EST LOW 20 MIN: CPT | Performed by: NURSE PRACTITIONER

## 2019-12-12 NOTE — PROGRESS NOTES
Surgical Oncology Follow Up       [address] [facility]  [facility]  [address] [facility] [address]    [place]  1945  [id number]    Chief Complaint   Patient presents with    Follow-up        Assessment/Plan:  1  Basal cell carcinoma (BCC) of scalp  - no concerns    2  Basal cell carcinoma (BCC) of skin of left ear  - no concerns, 6 mo f/u visit    3  Basal cell carcinoma (BCC) of left temple region  - small papule superior to incision-- pt will have this evaluated by her dermatologist next week and call with update  - 6 mo f/u visit if no concerns      Discussion/Summary:  Patient is a 35-year-old female who presents today for a six-month follow-up visit for basal cell carcinomas  She underwent excision of for sites of basal cell carcinoma with reconstruction by Dr Ayers see  The left temple and left ear margins were positive  Dr Anitra Alvarado has recommended observation given her reconstruction with skin grafts  The patient has had several other basal cell carcinomas which have been addressed by her dermatologist since her last visit with us, including 1 Mohs procedure of her right anterior scalp  On today's exam, her left ear incision is well healed with the exception of a small hole and a very small scab which is present  Overall this looks good  Her left temple incision is slightly reddened but no signs of infection  There is a small reddened papule just superior to this incision  She states that she is seeing her dermatologist on Monday and she will have this area examined at that time  This may be a benign finding however given its close proximity to her other basal cell site this should be further evaluated  I did encourage the patient to call our office after her Dermatology appointment with an update  If there are no concerns, we will plan to see the patient back in 6 months    She was instructed to call with any new concerns or symptoms prior to that time  All of her questions were answered today  History of Present Illness:        Basal cell carcinoma (BCC) of scalp    2/19/2019 Initial Diagnosis     Basal cell carcinoma (BCC) - biopsies of vertex, anterior scalp, left temple, and left ear      5/29/2019 Surgery     Excision of four sites of [facility] with reconstruction (Dr Meir Marin)  - left temple margins involved at multiple points  - anterior scalp margins are clear  - vertex margins are close but clear  - left ear margins are involved at multiple points              -Interval History:  Patient presents today for six-month follow-up visit for multiple basal cell carcinomas  She has no new complaints today  She does state that she had another basal cell carcinoma removed from her right anterior scalp and her left posterior leg since her last visit with us  She states that she will be seeing her dermatologist again on Monday  Dermatologist: Dr Paula Serna     Review of Systems:  Review of Systems   Constitutional: Negative for activity change, appetite change, chills, fatigue, fever and unexpected weight change  HENT: Negative for trouble swallowing  Eyes: Negative for pain, redness and visual disturbance  Respiratory: Negative for cough, shortness of breath and wheezing  Cardiovascular: Negative for chest pain, palpitations and leg swelling  Gastrointestinal: Negative for abdominal pain, constipation, diarrhea, nausea and vomiting  Endocrine: Negative for cold intolerance and heat intolerance  Musculoskeletal: Negative for arthralgias, back pain, gait problem and myalgias  Skin: Negative for color change and rash  Neurological: Negative for dizziness, syncope, light-headedness, numbness and headaches  Hematological: Negative for adenopathy  Psychiatric/Behavioral: Negative for agitation and confusion  All other systems reviewed and are negative        Patient Active Problem List   Diagnosis    Glucose intolerance    Pure hypercholesterolemia    Fatty infiltration of liver    Mixed obsessional thoughts and acts    Vitamin D deficiency    Osteoporosis    Acquired hypothyroidism    Basal cell carcinoma (BCC) of scalp    Recurrent major depressive disorder, in partial remission (HCC)    Moderate malnutrition (HCC)    Basal cell carcinoma of skin of face     Past Medical History:   Diagnosis Date    Anxiety     Asthma     Basal cell carcinoma (BCC) in situ of skin     Depression     Hashimoto's disease      Past Surgical History:   Procedure Laterality Date    FLAP LOCAL HEAD / NECK N/A 5/29/2019    Procedure: RECONSTRUCTION BASAL CELL CARCINOMA DEFECTS OF LEFT TEMPLE, VERTEX, ANTERIOR SCALP AND LEFT EAR;  Surgeon: Neo Hayden MD;  [facility];  Service: Plastics    LITHOTRIPSY      Renal    NM FULL THICK [facility] HEAD,FAC,HAND <20SQC N/A 5/29/2019    Procedure: FTSG LEFT EAR;  Surgeon: Neo Hayden MD;  [facility];  Service: Plastics    RECTAL VAGINAL FISTULECTOMY      SKIN BIOPSY      SKIN LESION EXCISION N/A 5/29/2019    Procedure: WIDE EXCISION BASAL CELL CARCINOMA VERTEX OF SCALP, LEFT EAR AND LEFT TEMPLE;  Surgeon: Jobe Bloch, MD;  Location[facility];  Service: Surgical Oncology    SPLIT THICKNESS SKIN GRAFT N/A 5/29/2019    Procedure: FLAP RECONSTRUCTION OF LEFT TEMPLE, STSG TO SCALP X2;  Surgeon: Neo Hayden MD;  [facility];  Service: Plastics    TONSILLECTOMY      with adenoidectomy     Family History   Adopted: Yes   Problem Relation Age of Onset    ALS Mother     Other Mother         Gastrointestinal bleeding    Other Father         Gastrointestinal bleeding    Alcohol abuse Father     Colon cancer Maternal Aunt     Psychosis Cousin      Social History     Socioeconomic History    Marital status:      Spouse name: Not on file    Number of children: 3    Years of education: 12    Highest education level:  Bachelor's degree (e g , BA, AB, AUREA)   Occupational History    Occupation: retired   Social Needs    Financial resource strain: Not on file    Food insecurity:     Worry: Not on file     Inability: Not on file   Fanium needs:     Medical: Not on file     Non-medical: Not on file   Tobacco Use    Smoking status: Former Smoker    Smokeless tobacco: Never Used    Tobacco comment: quit in 1968   Substance and Sexual Activity    Alcohol use: Not Currently    Drug use: No    Sexual activity: Not Currently   Lifestyle    Physical activity:     Days per week: Not on file     Minutes per session: Not on file    Stress: Not on file   Relationships    Social connections:     Talks on phone: Not on file     Gets together: Not on file     Attends Caodaism service: Not on file     Active member of club or organization: Not on file     Attends meetings of clubs or organizations: Not on file     Relationship status: Not on file    Intimate partner violence:     Fear of current or ex partner: Not on file     Emotionally abused: Not on file     Physically abused: Not on file     Forced sexual activity: Not on file   Other Topics Concern    Not on file   Social History Narrative    Not on file       Current Outpatient Medications:     acetaminophen (TYLENOL) 500 mg tablet, Take 500 mg by mouth every 8 (eight) hours as needed for mild pain, Disp: , Rfl:     sertraline (ZOLOFT) 50 mg tablet, Take 1 tablet (50 mg total) by mouth daily, Disp: 90 tablet, Rfl: 2    OLANZapine (ZyPREXA) 2 5 mg tablet, Take 1 tablet (2 5 mg total) by mouth daily at bedtime for 90 days, Disp: 90 tablet, Rfl: 2  Allergies   Allergen Reactions    Albuterol     Amoxicillin Diarrhea    Bee Venom     Clindamycin     Flu Virus Vaccine     Lisinopril     Sulfites      Vitals:    12/12/19 0849   BP: 130/80   Pulse: 77   Resp: 16   Temp: 98 7 °F (37 1 °C)       Physical Exam   Constitutional: She is oriented to person, place, and time   Vital signs are normal  She appears well-developed and well-nourished  No distress  HENT:   Head: Normocephalic and atraumatic  Neck: Normal range of motion  Cardiovascular: Normal rate, regular rhythm and normal heart sounds  Pulmonary/Chest: Effort normal and breath sounds normal    Abdominal: Soft  Normal appearance  She exhibits no mass  There is no hepatosplenomegaly  There is no tenderness  Musculoskeletal: Normal range of motion  Lymphadenopathy:     She has no axillary adenopathy  Right: No supraclavicular adenopathy present  Left: No supraclavicular adenopathy present  Neurological: She is alert and oriented to person, place, and time  Skin: Skin is warm, dry and intact  No rash noted  She is not diaphoretic  Scalp graft reconstruction sites are well healed without nodules or skin changes  The left ear incision sites are well healed  There is a small opening in the upper ear but this has healed well  This is stable  Of the left temple incision is slightly reddened in collar but without masses or nodules or skin grows  There is a small papule located just superior to this incision  See photo  Psychiatric: She has a normal mood and affect  Her speech is normal    Vitals reviewed  Media Information      Document Information     Clinical Image - Mobile Device      12/12/2019 9:12 AM   Attached To: Office Visit on 12/12/19 with Dory Atwood, [address] [place] [address] Planning/Advance Directives:  Discussed disease status, cancer treatment plans and/or cancer treatment goals with the patient

## 2019-12-13 ENCOUNTER — OFFICE VISIT (OUTPATIENT)
Dept: PSYCHIATRY | Facility: CLINIC | Age: 74
End: 2019-12-13
Payer: COMMERCIAL

## 2019-12-13 DIAGNOSIS — F32.3 MAJOR DEPRESSION WITH PSYCHOTIC FEATURES (HCC): ICD-10-CM

## 2019-12-13 PROCEDURE — 99214 OFFICE O/P EST MOD 30 MIN: CPT | Performed by: PSYCHIATRY & NEUROLOGY

## 2019-12-13 RX ORDER — SERTRALINE HYDROCHLORIDE 100 MG/1
100 TABLET, FILM COATED ORAL DAILY
Qty: 90 TABLET | Refills: 2 | Status: SHIPPED | OUTPATIENT
Start: 2019-12-13 | End: 2020-01-07 | Stop reason: SDUPTHER

## 2019-12-13 RX ORDER — OLANZAPINE 2.5 MG/1
2.5 TABLET ORAL
Qty: 90 TABLET | Refills: 2 | Status: SHIPPED | OUTPATIENT
Start: 2019-12-13 | End: 2020-03-13 | Stop reason: SDUPTHER

## 2019-12-13 NOTE — BH TREATMENT PLAN
TREATMENT PLAN (Medication Management Only)        [facility]    Name and Date of Birth:  Yuki Jaeger 76 y o  1945  Date of Treatment Plan: December 13, 2019  Diagnosis/Diagnoses:    1  Major depression with psychotic features [facility])      Strengths/Personal Resources for Self-Care: "perisitence ", supportive family, being resoureceful, self-reliance, sense of humor, well educated, willingness to work on problems  Area/Areas of need (in own words): "learn more about logic to why things happened in life"  1  Long Term Goal: emotional consistence  Target Date: 2 months - 2/13/2020  Person/Persons responsible for completion of goal: Dr Osbaldo Hammonds  2  Short Term Objective (s) - How will we reach this goal?:   A  Provider new recommended medication/dosage changes and/or continue medication(s): Medication changes: I have changed Marie Piper's OLANZapine and sertraline  I am also having her maintain her acetaminophen     B  N/A   C  N/A  Target Date: 6 months - 6/13/2020  Person/Persons Responsible for Completion of Goal: Dr Evangelist Hammonds Goals: continuing treatment  Treatment Modality: medication management every 3 months  Review due 90 to 120 days from date of this plan: 3 months - 3/13/2020  Expected length of service: ongoing treatment  My Physician/PA/NP and I have developed this plan together and I agree to work on the goals and objectives  I understand the treatment goals that were developed for my treatment

## 2019-12-13 NOTE — PATIENT INSTRUCTIONS
Major depression with psychotic features (HCC)  -     OLANZapine (ZyPREXA) 2 5 mg tablet; Take 1 tablet (2 5 mg total) by mouth daily at bedtime  -     sertraline (ZOLOFT) 100 mg tablet;  Take 1 tablet (100 mg total) by mouth daily          Follow up in 3 months

## 2019-12-13 NOTE — PSYCH
Subjective:     Patient ID: Erlinda Harada is a 76 y o  female with MDD, OCD, and grief being seen for a follow up  She is currently on Zoloft and Zyprexa  HPI ROS Appetite Changes and Sleep: the patient stated that she had a mole removal on her scalp in October and that has healed well  Another Basal cell carcinoma was found on her thigh as well  She is dealing with this and it is ongoing  she reports sleeping pretty well, once a week or once every other week she will have a night where she cannot fall asleep, but this is a life long issue and doesn't bother her  Energy level is good, not as sleepy in the mornings anymore  She has been able to work on getting her late 's paperwork shredded and cleaned out  She has also been knitting a lot and she really enjoys that  She has been very productive  She maintains her sense of humor throughout her ordeal with BCC  Appetite has been good, she has regained some weight about 15 lbs  Mood has been positive  She has still been checking on things multiple times  She can recognize when she has an obsessive throught and then can refrain from the compulsive behavior  She denied SI/HI, AVH  Some anxiety trying to figure things about her finances  She is working with her   This is going to be her first [place] without her   She will be at home with her son and his family  Review Of Systems:     Mood Anxiety   Behavior Compulsive Behavior   Thought Content Normal   General Emotional Problems and Sleep Disturbances   Personality Normal   Other Psych Symptoms Normal   Constitutional As Noted in HPI   ENT tinnitus, dry mouth   Cardiovascular Chest Pain and believes this is a pulled muscles after she had to rake leaves    this has now resolved   Respiratory Negative   Gastrointestinal Negative   Genitourinary Negative   Musculoskeletal Negative   Integumentary As Noted in HPI and Skin Lesions   Neurological Negative   Endocrine Normal    Other Symptoms Normal              Laboratory Results: No results found for this or any previous visit  Substance Abuse History:  Social History     Substance and Sexual Activity   Drug Use No       Family Psychiatric History:   Family History   Adopted: Yes   Problem Relation Age of Onset   Laura Drop ALS Mother     Other Mother         Gastrointestinal bleeding    Other Father         Gastrointestinal bleeding    Alcohol abuse Father     Colon cancer Maternal Aunt     Psychosis Cousin        The following portions of the patient's history were reviewed and updated as appropriate: allergies, current medications, past family history, past medical history, past social history, past surgical history and problem list     Social History     Socioeconomic History    Marital status:      Spouse name: Not on file    Number of children: 3    Years of education: 12    Highest education level:  Bachelor's degree (e g , BA, AB, BS)   Occupational History    Occupation: retired   Social Needs    Financial resource strain: Not on file    Food insecurity:     Worry: Not on file     Inability: Not on file   Cerebrex needs:     Medical: Not on file     Non-medical: Not on file   Tobacco Use    Smoking status: Former Smoker    Smokeless tobacco: Never Used    Tobacco comment: quit in 1968   Substance and Sexual Activity    Alcohol use: Not Currently    Drug use: No    Sexual activity: Not Currently   Lifestyle    Physical activity:     Days per week: Not on file     Minutes per session: Not on file    Stress: Not on file   Relationships    Social connections:     Talks on phone: Not on file     Gets together: Not on file     Attends Anabaptist service: Not on file     Active member of club or organization: Not on file     Attends meetings of clubs or organizations: Not on file     Relationship status: Not on file    Intimate partner violence:     Fear of current or ex partner: Not on file     Emotionally abused: Not on file     Physically abused: Not on file     Forced sexual activity: Not on file   Other Topics Concern    Not on file   Social History Narrative    Not on file     Social History     Social History Narrative    Not on file       Objective:       Mental status:  Appearance calm and cooperative , adequate hygiene and grooming and good eye contact    Mood euthymic   Affect affect was broad   Speech a normal rate   Thought Processes coherent/organized and normal thought processes   Hallucinations no hallucinations present    Thought Content no delusions   Abnormal Thoughts no suicidal thoughts  and no homicidal thoughts    Orientation  oriented to person and place and time   Remote Memory short term memory intact and long term memory intact   Attention Span concentration intact   Intellect Appears to be of Average Intelligence   Insight Insight intact   Judgement judgment was intact   Muscle Strength Muscle strength and tone were normal and Normal gait    Language no difficulty naming common objects and no difficulty repeating a phrase    Fund of Knowledge displays adequate knowledge of current events and adequate fund of knowledge regarding past history   Pain none   Pain Scale 0       Assessment/Plan:       Diagnoses and all orders for this visit:    Major depression with psychotic features (HCC)  -     OLANZapine (ZyPREXA) 2 5 mg tablet; Take 1 tablet (2 5 mg total) by mouth daily at bedtime  -     sertraline (ZOLOFT) 100 mg tablet; Take 1 tablet (100 mg total) by mouth daily          Follow up in 3 months  Treatment Recommendations- Risks Benefits      Immediate Medical/Psychiatric/Psychotherapy Treatments and Any Precautions: the patient is overall improved  Still with depression, anxiety, and some OCD symptoms  She has been showing improvement in her functioning and resilience  Will increase zoloft      Risks, Benefits And Possible Side Effects Of Medications:  {PSYCH RISK, BENEFITS AND POSSIBLE SIDE EFFECTS discussed  Advised to use Biotene and Xylimelts for her dry mouth    Controlled Medication Discussion: No records found for controlled prescriptions according to Steve Jasso 17       Psychotherapy Provided: Individual psychotherapy provided  Goals discussed in session:resiliants and perseverance       Counseling provided: 20

## 2020-01-06 ENCOUNTER — TELEPHONE (OUTPATIENT)
Dept: PSYCHIATRY | Facility: CLINIC | Age: 75
End: 2020-01-06

## 2020-01-06 NOTE — TELEPHONE ENCOUNTER
Tamika Hoover called and said the Zoloft she is taking that you uped to 100mg she would like to go back to the 50 mg because she is having a reaction from the higher dosage it gives her trouble to function properly  Can you give her a call so you can talk to her about her symptoms and change her dosage back to the 50mg  Thank you

## 2020-01-07 DIAGNOSIS — F32.3 MAJOR DEPRESSION WITH PSYCHOTIC FEATURES (HCC): ICD-10-CM

## 2020-01-07 NOTE — TELEPHONE ENCOUNTER
Can you please call her and se what kind of reaction she is having to the 100mg? Perhaps we can do 75mg instead? If she really wants to go back to 50mg, we can as well

## 2020-01-07 NOTE — TELEPHONE ENCOUNTER
Thanks for speaking to her Delmi Oliveira  She can then continue to go back to the 50mg dose  I will make the change in her chart

## 2020-01-07 NOTE — TELEPHONE ENCOUNTER
I spoke with Poppy Luevano  She said she's not doing well on the 100 mg of Zoloft - generally feels worse  She feels more confused  Her sleep is disturbed - mostly trouble falling asleep, but she has been waking during the night and then has trouble falling back to sleep  She's having more dreams - not night butler, but they are "busy " When she's waking, the leg she's not lying on "jumps "  She has no energy to in the daytime  Not able to complete her routine  Her bladder seems more "irritable " Eyes are still blurry  I reviewed Dr Wendy Chapman direction  Poppy Luevano plans to go back to taking 50 mg - 100 mg tabs are scored, so she can use those for now  She will be going to [place] (her son drives) on 08/87/55  I encouraged her to call if she has further concerns after decreasing the medication  She thanked Dr Trinh Bloom (and me) for the good care she's getting

## 2020-03-13 ENCOUNTER — OFFICE VISIT (OUTPATIENT)
Dept: PSYCHIATRY | Facility: CLINIC | Age: 75
End: 2020-03-13
Payer: COMMERCIAL

## 2020-03-13 DIAGNOSIS — F32.3 MAJOR DEPRESSION WITH PSYCHOTIC FEATURES (HCC): ICD-10-CM

## 2020-03-13 PROCEDURE — 90833 PSYTX W PT W E/M 30 MIN: CPT | Performed by: PSYCHIATRY & NEUROLOGY

## 2020-03-13 PROCEDURE — 3079F DIAST BP 80-89 MM HG: CPT | Performed by: PSYCHIATRY & NEUROLOGY

## 2020-03-13 PROCEDURE — 1160F RVW MEDS BY RX/DR IN RCRD: CPT | Performed by: PSYCHIATRY & NEUROLOGY

## 2020-03-13 PROCEDURE — 99213 OFFICE O/P EST LOW 20 MIN: CPT | Performed by: PSYCHIATRY & NEUROLOGY

## 2020-03-13 PROCEDURE — 1036F TOBACCO NON-USER: CPT | Performed by: PSYCHIATRY & NEUROLOGY

## 2020-03-13 PROCEDURE — 4040F PNEUMOC VAC/ADMIN/RCVD: CPT | Performed by: PSYCHIATRY & NEUROLOGY

## 2020-03-13 PROCEDURE — 3075F SYST BP GE 130 - 139MM HG: CPT | Performed by: PSYCHIATRY & NEUROLOGY

## 2020-03-13 RX ORDER — OLANZAPINE 2.5 MG/1
2.5 TABLET ORAL
Qty: 90 TABLET | Refills: 2 | Status: SHIPPED | OUTPATIENT
Start: 2020-03-13 | End: 2020-09-16

## 2020-03-13 NOTE — PATIENT INSTRUCTIONS
Major depression with psychotic features (HCC)  -     OLANZapine (ZyPREXA) 2 5 mg tablet; Take 1 tablet (2 5 mg total) by mouth daily at bedtime  -     sertraline (ZOLOFT) 50 mg tablet;  Take 1 tablet (50 mg total) by mouth daily          Follow up in 6 months

## 2020-03-13 NOTE — BH TREATMENT PLAN
TREATMENT PLAN (Medication Management Only)        [facility]    Name and Date of Birth:  Francy Najera 76 y o  1945  Date of Treatment Plan: March 13, 2020  Diagnosis/Diagnoses:    1  Major depression with psychotic features [facility])      Strengths/Personal Resources for Self-Care: "consistent, persistent, skeptical, but not overally pessimistic", supportive family, taking medications as prescribed, ability to communicate needs, ability to communicate well, ability to listen, ability to understand psychiatric illness, general fund of knowledge, motivation for treatment, being resoureceful, self-reliance, well educated, willingness to work on problems  Area/Areas of need (in own words): "easily irritated, skeptical"  1  Long Term Goal: Feel more positive about the future  Target Date: 2 months - 5/13/2020  Person/Persons responsible for completion of goal: Dr Lokesh Robert  2  Short Term Objective (s) - How will we reach this goal?:   A  Provider new recommended medication/dosage changes and/or continue medication(s): Medication changes: I am having Francy Najera maintain her acetaminophen, OLANZapine, and sertraline     B  N/A   C  N/A  Target Date: 6 months - 9/13/2020  Person/Persons Responsible for Completion of Goal: Dr Lokesh Robert  Progress Towards Goals: continuing treatment, moderate progress  Treatment Modality: medication management every 6 months  Review due 90 to 120 days from date of this plan: 6 months  Expected length of service: maintenance  My Physician/PA/NP and I have developed this plan together and I agree to work on the goals and objectives  I understand the treatment goals that were developed for my treatment

## 2020-03-13 NOTE — PSYCH
Subjective:     Patient ID: Adriel Juarez is a 76 y o  female with MDD, OCD, and grief being seen for a follow up  She is currently on Zoloft and Zyprexa  HPI ROS Appetite Changes and Sleep: the patient stated that she has been doing well  She has been compliant with her medications  She had to scale back to 50mg of the zoloft as the 100mg was too sedating for her and made her unmotivated  She stated that the OCD has been under control  She takes both meds at bedtime and sleeps all night, but is feeling a little groggy in the morning  It will take her until 3pm to get going well  She was able to drive this morning without issue  Appetite has been good with some weight gain  Her clothes no longer fall off of her  Update on the cancer: she is going back to the surgeon in April and is having some difficulty with healing on her ear and has found a new spot on her neck  She has cone to terms with expecting new ones to pop up  She took a recent trip to [place] with her son and left her BMW in the garage in [place], which she is happy about  She will be making another trip to [place]  She has some days when she will not sleep for more than 2 hours  This has been ongoing since she was a child  She denied panic attacks  She denied SI/HI  Review Of Systems:     Mood Anxiety and Depression   Behavior Compulsive Behavior improved   Thought Content Normal   General Emotional Problems and Sleep Disturbances   Personality Normal   Other Psych Symptoms Normal   Constitutional As Noted in HPI   ENT tinnitus, dry mouth is much better   Cardiovascular infrequent chest pain   Respiratory Negative   Gastrointestinal Negative   Genitourinary Negative   Musculoskeletal Negative   Integumentary As Noted in HPI and Skin Lesions   Neurological Negative   Endocrine Normal    Other Symptoms Normal              Laboratory Results: No results found for this or any previous visit      Substance Abuse History:  Social History Substance and Sexual Activity   Drug Use No       Family Psychiatric History:   Family History   Adopted: Yes   Problem Relation Age of Onset   [facility] ALS Mother     Other Mother         Gastrointestinal bleeding    Other Father         Gastrointestinal bleeding    Alcohol abuse Father     Colon cancer Maternal Aunt     Psychosis Cousin        The following portions of the patient's history were reviewed and updated as appropriate: allergies, current medications, past family history, past medical history, past social history, past surgical history and problem list     Social History     Socioeconomic History    Marital status:      Spouse name: Not on file    Number of children: 3    Years of education: 12    Highest education level:  Bachelor's degree (e g , BA, AB, BS)   Occupational History    Occupation: retired   Social Needs    Financial resource strain: Not on file    Food insecurity:     Worry: Not on file     Inability: Not on file   [employer] needs:     Medical: Not on file     Non-medical: Not on file   Tobacco Use    Smoking status: Former Smoker    Smokeless tobacco: Never Used    Tobacco comment: quit in 1968   Substance and Sexual Activity    Alcohol use: Not Currently    Drug use: No    Sexual activity: Not Currently   Lifestyle    Physical activity:     Days per week: Not on file     Minutes per session: Not on file    Stress: Not on file   Relationships    Social connections:     Talks on phone: Not on file     Gets together: Not on file     Attends [employer] service: Not on file     Active member of club or organization: Not on file     Attends meetings of clubs or organizations: Not on file     Relationship status: Not on file    Intimate partner violence:     Fear of current or ex partner: Not on file     Emotionally abused: Not on file     Physically abused: Not on file     Forced sexual activity: Not on file   Other Topics Concern    Not on file   Social History Narrative    Not on file     Social History     Social History Narrative    Not on file       Objective:       Mental status:  Appearance calm and cooperative , adequate hygiene and grooming and good eye contact    Mood euthymic   Affect affect was broad   Speech a normal rate   Thought Processes coherent/organized and normal thought processes   Hallucinations no hallucinations present    Thought Content no delusions   Abnormal Thoughts no suicidal thoughts  and no homicidal thoughts    Orientation  oriented to person and place and time   Remote Memory short term memory intact and long term memory intact   Attention Span concentration intact   Intellect Appears to be of Average Intelligence   Insight Insight intact   Judgement judgment was intact   Muscle Strength Muscle strength and tone were normal and Normal gait    Language no difficulty naming common objects and no difficulty repeating a phrase    Fund of Knowledge displays adequate knowledge of current events and adequate fund of knowledge regarding past history   Pain none   Pain Scale 0       Assessment/Plan:       Diagnoses and all orders for this visit:    Major depression with psychotic features (HCC)  -     OLANZapine (ZyPREXA) 2 5 mg tablet; Take 1 tablet (2 5 mg total) by mouth daily at bedtime  -     sertraline (ZOLOFT) 50 mg tablet; Take 1 tablet (50 mg total) by mouth daily          Follow up in 6 months    Treatment Recommendations- Risks Benefits      Immediate Medical/Psychiatric/Psychotherapy Treatments and Any Precautions: the patient is overall improved  OCD has improved  She is coming to terms with her skin cancer lesions  She is in good spirits today  Risks, Benefits And Possible Side Effects Of Medications:  PSYCH RISK, BENEFITS AND POSSIBLE SIDE EFFECTS discussed to take her sertraline in the morning or evening, staggering it with the zyprexa      Controlled Medication Discussion: No records found for controlled prescriptions according to Steve Jasso [address]       Psychotherapy Provided: Individual psychotherapy provided       Goals discussed in session: fogginess, doing things at home, coping with cancer  Counseling provided: 20

## 2020-08-03 ENCOUNTER — CONSULT (OUTPATIENT)
Dept: PLASTIC SURGERY | Facility: CLINIC | Age: 75
End: 2020-08-03
Payer: COMMERCIAL

## 2020-08-03 VITALS — HEIGHT: 65 IN | RESPIRATION RATE: 16 BRPM | WEIGHT: 156.6 LBS | BODY MASS INDEX: 26.09 KG/M2 | TEMPERATURE: 98 F

## 2020-08-03 DIAGNOSIS — C44.219 BASAL CELL CARCINOMA, EAR, LEFT: Primary | ICD-10-CM

## 2020-08-03 PROCEDURE — 4040F PNEUMOC VAC/ADMIN/RCVD: CPT | Performed by: SURGERY

## 2020-08-03 PROCEDURE — 99214 OFFICE O/P EST MOD 30 MIN: CPT | Performed by: SURGERY

## 2020-08-03 PROCEDURE — 1036F TOBACCO NON-USER: CPT | Performed by: SURGERY

## 2020-08-03 PROCEDURE — 1160F RVW MEDS BY RX/DR IN RCRD: CPT | Performed by: SURGERY

## 2020-08-03 PROCEDURE — 3008F BODY MASS INDEX DOCD: CPT | Performed by: SURGERY

## 2020-08-03 PROCEDURE — 3079F DIAST BP 80-89 MM HG: CPT | Performed by: SURGERY

## 2020-08-03 PROCEDURE — 3075F SYST BP GE 130 - 139MM HG: CPT | Performed by: SURGERY

## 2020-08-03 NOTE — LETTER
August 3, 2020     Mark Carmona, [address] [place], [place] [address]y  [address]    Patient: Natalie Goins   YOB: 1945   Date of Visit: 8/3/2020       Dear Dr Yazan Norris:    Thank you for referring Amelia Farley to me for evaluation  Below are my notes for this consultation  If you have questions, please do not hesitate to call me  I look forward to following your patient along with you  Sincerely,        Allyson Solano MD        CC: No Recipients  Allyson Solano MD  8/3/2020  3:41 PM  Sign when Signing Visit  Assessment/Plan:  Ple staged procedure utilizing postauricular flap ase see HPI  She has basal cell carcinoma of the superior pole of the left ear and will be undergoing Mohs with Dr Yazan Norris  At today's visit we discussed potential options for reconstruction including full-thickness skin grafting, local/sedation tissue transfer techniques including helical rim advancement, etc   We also talked about the possibility of a staged technique utilizing a postauricular scalp flap  We talked about the different techniques, potential risks, complications limitations  At this point, she feels that she would prefer to avoid complicated reconstruction would be happy with local wound closure  We will discuss this following the Mohs procedure and determine the reconstructive modality at that time  She understands,       There are no diagnoses linked to this encounter  Subjective:  Basal cell carcinoma     Patient ID: Natalie Goins is a 76 y o  female  HPI Danilo Matthews is a nice 41-year-old lady, previously known to me, who returns to discuss options for reconstruction of a Mohs defect of the left ear where she has a basal cell carcinoma  She has been referred by Dr Yazan Norris      The following portions of the patient's history were reviewed and updated as appropriate: allergies, current medications, past family history, past medical history, past social history, past surgical history and problem list     Review of Systems   Constitutional: Negative for chills and fever  HENT: Positive for hearing loss  Eyes: Positive for visual disturbance  Negative for discharge  Respiratory: Negative for chest tightness and shortness of breath  Cardiovascular: Negative for chest pain and leg swelling  Genitourinary: Negative for dysuria  Musculoskeletal: Negative for gait problem  Skin: Negative for rash  Allergic/Immunologic: Negative for immunocompromised state  Neurological: Negative for seizures and headaches  Hematological: Does not bruise/bleed easily  Psychiatric/Behavioral: The patient is nervous/anxious  Objective:      Temp 98 °F (36 7 °C) (Temporal)   Resp 16   Ht 5' 5"   Wt 71 kg (156 lb 9 6 oz)   BMI 26 06 kg/m²          Physical Exam   HENT:   Head: Normocephalic  Nose: Nose normal    Mouth/Throat: Mucous membranes are moist    Eyes: Pupils are equal, round, and reactive to light  Neck: Normal range of motion  Neck supple  Cardiovascular: Normal rate  Pulmonary/Chest: Effort normal    Abdominal: Soft  Normal appearance  Musculoskeletal: Normal range of motion  Neurological: She is alert  Skin: Skin is warm     Psychiatric: Mood normal

## 2020-08-03 NOTE — PROGRESS NOTES
Assessment/Plan:  Ple staged procedure utilizing postauricular flap ase see HPI  She has basal cell carcinoma of the superior pole of the left ear and will be undergoing Mohs with Dr Ana Maria Freeman  At today's visit we discussed potential options for reconstruction including full-thickness skin grafting, local/sedation tissue transfer techniques including helical rim advancement, etc   We also talked about the possibility of a staged technique utilizing a postauricular scalp flap  We talked about the different techniques, potential risks, complications limitations  At this point, she feels that she would prefer to avoid complicated reconstruction would be happy with local wound closure  We will discuss this following the Mohs procedure and determine the reconstructive modality at that time  She understands,       There are no diagnoses linked to this encounter  Subjective:  Basal cell carcinoma     Patient ID: Constantino Villareal is a 76 y o  female  HPI Amari Porter is a nice 66-year-old lady, previously known to me, who returns to discuss options for reconstruction of a Mohs defect of the left ear where she has a basal cell carcinoma  She has been referred by Dr Ana Maria Freeman  The following portions of the patient's history were reviewed and updated as appropriate: allergies, current medications, past family history, past medical history, past social history, past surgical history and problem list     Review of Systems   Constitutional: Negative for chills and fever  HENT: Positive for hearing loss  Eyes: Positive for visual disturbance  Negative for discharge  Respiratory: Negative for chest tightness and shortness of breath  Cardiovascular: Negative for chest pain and leg swelling  Genitourinary: Negative for dysuria  Musculoskeletal: Negative for gait problem  Skin: Negative for rash  Allergic/Immunologic: Negative for immunocompromised state  Neurological: Negative for seizures and headaches  Hematological: Does not bruise/bleed easily  Psychiatric/Behavioral: The patient is nervous/anxious  Objective:      Temp 98 °F (36 7 °C) (Temporal)   Resp 16   Ht 5' 5"   Wt 71 kg (156 lb 9 6 oz)   BMI 26 06 kg/m²          Physical Exam   HENT:   Head: Normocephalic  Nose: Nose normal    Mouth/Throat: Mucous membranes are moist    Eyes: Pupils are equal, round, and reactive to light  Neck: Normal range of motion  Neck supple  Cardiovascular: Normal rate  Pulmonary/Chest: Effort normal    Abdominal: Soft  Normal appearance  Musculoskeletal: Normal range of motion  Neurological: She is alert  Skin: Skin is warm     Psychiatric: Mood normal

## 2020-08-12 ENCOUNTER — APPOINTMENT (OUTPATIENT)
Dept: LAB | Facility: CLINIC | Age: 75
End: 2020-08-12
Payer: COMMERCIAL

## 2020-08-12 DIAGNOSIS — C44.219 BASAL CELL CARCINOMA, EAR, LEFT: ICD-10-CM

## 2020-08-12 DIAGNOSIS — E78.00 PURE HYPERCHOLESTEROLEMIA: ICD-10-CM

## 2020-08-12 DIAGNOSIS — E03.9 ACQUIRED HYPOTHYROIDISM: ICD-10-CM

## 2020-08-12 DIAGNOSIS — E74.39 GLUCOSE INTOLERANCE: ICD-10-CM

## 2020-08-12 LAB
ALBUMIN SERPL BCP-MCNC: 4.2 G/DL (ref 3.5–5)
ALP SERPL-CCNC: 78 U/L (ref 46–116)
ALT SERPL W P-5'-P-CCNC: 27 U/L (ref 12–78)
ANION GAP SERPL CALCULATED.3IONS-SCNC: 8 MMOL/L (ref 4–13)
AST SERPL W P-5'-P-CCNC: 19 U/L (ref 5–45)
BASOPHILS # BLD AUTO: 0.05 THOUSANDS/ΜL (ref 0–0.1)
BASOPHILS NFR BLD AUTO: 1 % (ref 0–1)
BILIRUB SERPL-MCNC: 0.56 MG/DL (ref 0.2–1)
BUN SERPL-MCNC: 14 MG/DL (ref 5–25)
CALCIUM SERPL-MCNC: 9.3 MG/DL (ref 8.3–10.1)
CHLORIDE SERPL-SCNC: 106 MMOL/L (ref 100–108)
CHOLEST SERPL-MCNC: 323 MG/DL (ref 50–200)
CO2 SERPL-SCNC: 25 MMOL/L (ref 21–32)
CREAT SERPL-MCNC: 0.7 MG/DL (ref 0.6–1.3)
EOSINOPHIL # BLD AUTO: 0.28 THOUSAND/ΜL (ref 0–0.61)
EOSINOPHIL NFR BLD AUTO: 4 % (ref 0–6)
ERYTHROCYTE [DISTWIDTH] IN BLOOD BY AUTOMATED COUNT: 13.1 % (ref 11.6–15.1)
EST. AVERAGE GLUCOSE BLD GHB EST-MCNC: 166 MG/DL
GFR SERPL CREATININE-BSD FRML MDRD: 86 ML/MIN/1.73SQ M
GLUCOSE P FAST SERPL-MCNC: 160 MG/DL (ref 65–99)
HBA1C MFR BLD: 7.4 %
HCT VFR BLD AUTO: 40.5 % (ref 34.8–46.1)
HDLC SERPL-MCNC: 55 MG/DL
HGB BLD-MCNC: 13.9 G/DL (ref 11.5–15.4)
IMM GRANULOCYTES # BLD AUTO: 0.02 THOUSAND/UL (ref 0–0.2)
IMM GRANULOCYTES NFR BLD AUTO: 0 % (ref 0–2)
LDLC SERPL CALC-MCNC: 234 MG/DL (ref 0–100)
LYMPHOCYTES # BLD AUTO: 1.95 THOUSANDS/ΜL (ref 0.6–4.47)
LYMPHOCYTES NFR BLD AUTO: 28 % (ref 14–44)
MCH RBC QN AUTO: 31 PG (ref 26.8–34.3)
MCHC RBC AUTO-ENTMCNC: 34.3 G/DL (ref 31.4–37.4)
MCV RBC AUTO: 90 FL (ref 82–98)
MONOCYTES # BLD AUTO: 0.61 THOUSAND/ΜL (ref 0.17–1.22)
MONOCYTES NFR BLD AUTO: 9 % (ref 4–12)
NEUTROPHILS # BLD AUTO: 4.08 THOUSANDS/ΜL (ref 1.85–7.62)
NEUTS SEG NFR BLD AUTO: 58 % (ref 43–75)
NRBC BLD AUTO-RTO: 0 /100 WBCS
PLATELET # BLD AUTO: 205 THOUSANDS/UL (ref 149–390)
PMV BLD AUTO: 10.2 FL (ref 8.9–12.7)
POTASSIUM SERPL-SCNC: 4.3 MMOL/L (ref 3.5–5.3)
PROT SERPL-MCNC: 8.1 G/DL (ref 6.4–8.2)
RBC # BLD AUTO: 4.48 MILLION/UL (ref 3.81–5.12)
SODIUM SERPL-SCNC: 139 MMOL/L (ref 136–145)
T4 FREE SERPL-MCNC: 0.81 NG/DL (ref 0.76–1.46)
TRIGL SERPL-MCNC: 168 MG/DL
TSH SERPL DL<=0.05 MIU/L-ACNC: 7.32 UIU/ML (ref 0.36–3.74)
WBC # BLD AUTO: 6.99 THOUSAND/UL (ref 4.31–10.16)

## 2020-08-12 PROCEDURE — 84439 ASSAY OF FREE THYROXINE: CPT

## 2020-08-12 PROCEDURE — 36415 COLL VENOUS BLD VENIPUNCTURE: CPT

## 2020-08-12 PROCEDURE — 80061 LIPID PANEL: CPT

## 2020-08-12 PROCEDURE — 80053 COMPREHEN METABOLIC PANEL: CPT

## 2020-08-12 PROCEDURE — 84443 ASSAY THYROID STIM HORMONE: CPT

## 2020-08-12 PROCEDURE — 83036 HEMOGLOBIN GLYCOSYLATED A1C: CPT

## 2020-08-12 PROCEDURE — 85025 COMPLETE CBC W/AUTO DIFF WBC: CPT

## 2020-08-12 PROCEDURE — 3051F HG A1C>EQUAL 7.0%<8.0%: CPT | Performed by: SURGERY

## 2020-09-03 ENCOUNTER — ANESTHESIA EVENT (OUTPATIENT)
Dept: PERIOP | Facility: AMBULARY SURGERY CENTER | Age: 75
End: 2020-09-03
Payer: COMMERCIAL

## 2020-09-05 ENCOUNTER — OFFICE VISIT (OUTPATIENT)
Dept: LAB | Facility: CLINIC | Age: 75
End: 2020-09-05
Payer: COMMERCIAL

## 2020-09-05 ENCOUNTER — TRANSCRIBE ORDERS (OUTPATIENT)
Dept: LAB | Facility: CLINIC | Age: 75
End: 2020-09-05

## 2020-09-05 DIAGNOSIS — C44.219 BASAL CELL CARCINOMA, EAR, LEFT: ICD-10-CM

## 2020-09-05 LAB
ATRIAL RATE: 60 BPM
P AXIS: 68 DEGREES
PR INTERVAL: 130 MS
QRS AXIS: -48 DEGREES
QRSD INTERVAL: 72 MS
QT INTERVAL: 422 MS
QTC INTERVAL: 422 MS
T WAVE AXIS: 56 DEGREES
VENTRICULAR RATE: 60 BPM

## 2020-09-05 PROCEDURE — 93005 ELECTROCARDIOGRAM TRACING: CPT

## 2020-09-05 PROCEDURE — 93010 ELECTROCARDIOGRAM REPORT: CPT | Performed by: INTERNAL MEDICINE

## 2020-09-10 NOTE — PRE-PROCEDURE INSTRUCTIONS
Pre-Surgery Instructions:   Medication Instructions    acetaminophen (TYLENOL) 500 mg tablet Instructed patient per Anesthesia Guidelines   OLANZapine (ZyPREXA) 2 5 mg tablet Instructed patient per Anesthesia Guidelines   sertraline (ZOLOFT) 50 mg tablet Instructed patient per Anesthesia Guidelines  Pre-op instructions provided pt w/ verb understanding, including meds & showering instructions  Pt to use antibacterial soap

## 2020-09-14 ENCOUNTER — OFFICE VISIT (OUTPATIENT)
Dept: FAMILY MEDICINE CLINIC | Facility: CLINIC | Age: 75
End: 2020-09-14
Payer: COMMERCIAL

## 2020-09-14 VITALS
WEIGHT: 160 LBS | OXYGEN SATURATION: 97 % | TEMPERATURE: 96.5 F | DIASTOLIC BLOOD PRESSURE: 80 MMHG | HEIGHT: 65 IN | HEART RATE: 86 BPM | BODY MASS INDEX: 26.66 KG/M2 | RESPIRATION RATE: 16 BRPM | SYSTOLIC BLOOD PRESSURE: 150 MMHG

## 2020-09-14 DIAGNOSIS — F33.41 RECURRENT MAJOR DEPRESSIVE DISORDER, IN PARTIAL REMISSION (HCC): ICD-10-CM

## 2020-09-14 DIAGNOSIS — E03.8 SUBCLINICAL HYPOTHYROIDISM: ICD-10-CM

## 2020-09-14 DIAGNOSIS — E78.00 PURE HYPERCHOLESTEROLEMIA: Primary | ICD-10-CM

## 2020-09-14 DIAGNOSIS — E11.9 TYPE 2 DIABETES MELLITUS WITHOUT COMPLICATION, WITHOUT LONG-TERM CURRENT USE OF INSULIN (HCC): ICD-10-CM

## 2020-09-14 DIAGNOSIS — F42.2 MIXED OBSESSIONAL THOUGHTS AND ACTS: ICD-10-CM

## 2020-09-14 DIAGNOSIS — C44.319 BASAL CELL CARCINOMA (BCC) OF LEFT TEMPLE REGION: ICD-10-CM

## 2020-09-14 DIAGNOSIS — R03.0 ELEVATED BP WITHOUT DIAGNOSIS OF HYPERTENSION: ICD-10-CM

## 2020-09-14 PROCEDURE — 99214 OFFICE O/P EST MOD 30 MIN: CPT | Performed by: FAMILY MEDICINE

## 2020-09-14 NOTE — H&P (VIEW-ONLY)
FAMILY MEDICINE PROGRESS NOTE  Silvestre Courtney 76 y o  female   DATE: September 14, 2020     ASSESSMENT and PLAN:  Silvestre Courtney is a 76 y o  female with:     Problem List Items Addressed This Visit        Endocrine    Type 2 diabetes mellitus without complication, without long-term current use of insulin ([facility] [address] )       Lab Results   Component Value Date    HGBA1C 7 4 (H) 08/12/2020     Worsened in the last 6 months, can consider meds if persistently elevated despite dietary changes  Repeat A1c in 3-4 months         Relevant Orders    Hemoglobin A1C    Comprehensive metabolic panel    Subclinical hypothyroidism     Lab Results   Component Value Date    [id number] 7 320 (H) 08/12/2020     Normal FT4    No meds indicated at this time         Relevant Orders    TSH, 3rd generation with Free T4 reflex       Musculoskeletal and Integument    Basal cell carcinoma (BCC) of left temple region     Scheduled for Moh's surgery tomorrow            Other    Pure hypercholesterolemia - Primary     Component      Latest Ref Rng & Units 12/6/2019 8/12/2020   Cholesterol      50 - 200 mg/dL 242 (H) 323 (H)   Triglycerides      <=150 mg/dL 137 168 (H)   HDL      >=40 mg/dL 72 55   LDL Calculated      0 - 100 mg/dL 143 (H) 234 (H)     Worsening, pt refusing statins again despite counseling  Reinforced importance of diet         Relevant Orders    Lipid Panel with Direct LDL reflex    Mixed obsessional thoughts and acts     F/w Psych (Dr Claudeen Cobble)  Not very well controlled with Zyprexa 2 5mg and Zoloft 25mg daily         Recurrent major depressive disorder, in partial remission ([facility] [address] )      Other Visit Diagnoses     Elevated BP without diagnosis of hypertension            Three month trial diet and lifestyle modifications, blood partial also likely elevated due to recent weight gain  No changes to medications at this time, follow-up with psychiatry    Recommend repeat labs in 3-4 months, if labs persistently elevated, discussed that I would recommend medications  SUBJECTIVE:  Cas Valdivia is a 76 y o  female who presents today with a chief complaint of Follow-up  Pt has been on Zyprexa 2 5mg and Zoloft 50mg per psychiatry since Jan 2019 for anxiety, depression, OCD and has noticed a 20-30 pound weight gain  Patient had labs done for prior to [facility] surgery tomorrow  She has had an increase in HLD with elevated panel, A1c has worsened from pre-diabetic range to 7 4%  She has also having some urinary frequency and incontinence  She has subclinical hypothyroidism, not on any meds    Review of Systems   Endocrine: Positive for polyuria  Musculoskeletal: Positive for back pain  Psychiatric/Behavioral: Positive for dysphoric mood and sleep disturbance  The patient is nervous/anxious  I have reviewed the patient's Past Medical History  Current Outpatient Medications:     acetaminophen (TYLENOL) 500 mg tablet, Take 500 mg by mouth every 8 (eight) hours as needed for mild pain, Disp: , Rfl:     OLANZapine (ZyPREXA) 2 5 mg tablet, Take 1 tablet (2 5 mg total) by mouth daily at bedtime, Disp: 90 tablet, Rfl: 2    sertraline (ZOLOFT) 50 mg tablet, Take 1 tablet (50 mg total) by mouth daily, Disp: 90 tablet, Rfl: 1    OBJECTIVE:  /80   Pulse 86   Temp (!) 96 5 °F (35 8 °C)   Resp 16   Ht 5' 5" (1 651 m)   Wt 72 6 kg (160 lb)   SpO2 97%   BMI 26 63 kg/m²      I have spent 26 minutes with Patient today in which greater than 50% of this time was spent in counseling/coordination of care regarding Risks and benefits of tx options, Instructions for management, Patient and family education, Importance of treatment compliance and Impressions  Aly Asif MD    Note: Portions of the record have been created with voice recognition software  Occasional wrong word or "sound a like" substitutions may have occurred due to the inherent limitations of voice recognition software   Read the chart carefully and recognize, using context, where substitutions have occurred

## 2020-09-14 NOTE — ASSESSMENT & PLAN NOTE
Lab Results   Component Value Date    [id number] 7 320 (H) 08/12/2020     Normal FT4    No meds indicated at this time

## 2020-09-14 NOTE — ASSESSMENT & PLAN NOTE
Component      Latest Ref Rng & Units 12/6/2019 8/12/2020   Cholesterol      50 - 200 mg/dL 242 (H) 323 (H)   Triglycerides      <=150 mg/dL 137 168 (H)   HDL      >=40 mg/dL 72 55   LDL Calculated      0 - 100 mg/dL 143 (H) 234 (H)     Worsening, pt refusing statins again despite counseling  Reinforced importance of diet

## 2020-09-14 NOTE — ANESTHESIA PREPROCEDURE EVALUATION
Procedure:  EAR MOHS DEFECT RECONSTRUCTION (Left Ear)  EAR FLAP (Left Ear)  EAR FULL THICKNESS SKIN GRAFT (FTSG) (Left Ear)    Relevant Problems   CARDIO   (+) Pure hypercholesterolemia      ENDO   (+) Subclinical hypothyroidism   (+) Type 2 diabetes mellitus without complication, without long-term current use of insulin (HCC)      GI/HEPATIC   (+) Fatty infiltration of liver      NEURO/PSYCH   (+) Recurrent major depressive disorder, in partial remission (HCC)        Physical Exam    Airway    Mallampati score: II  TM Distance: >3 FB  Neck ROM: full     Dental   No notable dental hx     Cardiovascular  Cardiovascular exam normal    Pulmonary  Pulmonary exam normal     Other Findings        Anesthesia Plan  ASA Score- 3     Anesthesia Type-         Additional Monitors:   Airway Plan: LMA  Plan Factors-Exercise tolerance (METS): >4 METS  Chart reviewed  Existing labs reviewed  Patient is not a current smoker  Patient not instructed to abstain from smoking on day of procedure  Patient did not smoke on day of surgery  Induction- intravenous  Postoperative Plan-     Informed Consent- Anesthetic plan and risks discussed with patient  I personally reviewed this patient with the CRNA  Discussed and agreed on the Anesthesia Plan with the CRNA  Jyothi Carlos

## 2020-09-14 NOTE — ASSESSMENT & PLAN NOTE
Lab Results   Component Value Date    HGBA1C 7 4 (H) 08/12/2020     Worsened in the last 6 months, can consider meds if persistently elevated despite dietary changes  Repeat A1c in 3-4 months

## 2020-09-14 NOTE — PROGRESS NOTES
FAMILY MEDICINE PROGRESS NOTE  Caitlin Burgos 76 y o  female   DATE: September 14, 2020     ASSESSMENT and PLAN:  Caitlin Burgos is a 76 y o  female with:     Problem List Items Addressed This Visit        Endocrine    Type 2 diabetes mellitus without complication, without long-term current use of insulin ([facility] [address] )       Lab Results   Component Value Date    HGBA1C 7 4 (H) 08/12/2020     Worsened in the last 6 months, can consider meds if persistently elevated despite dietary changes  Repeat A1c in 3-4 months         Relevant Orders    Hemoglobin A1C    Comprehensive metabolic panel    Subclinical hypothyroidism     Lab Results   Component Value Date    [id number] 7 320 (H) 08/12/2020     Normal FT4    No meds indicated at this time         Relevant Orders    TSH, 3rd generation with Free T4 reflex       Musculoskeletal and Integument    Basal cell carcinoma (BCC) of left temple region     Scheduled for Moh's surgery tomorrow            Other    Pure hypercholesterolemia - Primary     Component      Latest Ref Rng & Units 12/6/2019 8/12/2020   Cholesterol      50 - 200 mg/dL 242 (H) 323 (H)   Triglycerides      <=150 mg/dL 137 168 (H)   HDL      >=40 mg/dL 72 55   LDL Calculated      0 - 100 mg/dL 143 (H) 234 (H)     Worsening, pt refusing statins again despite counseling  Reinforced importance of diet         Relevant Orders    Lipid Panel with Direct LDL reflex    Mixed obsessional thoughts and acts     F/w Psych (Dr Padma Celestin)  Not very well controlled with Zyprexa 2 5mg and Zoloft 25mg daily         Recurrent major depressive disorder, in partial remission ([facility] [address] )      Other Visit Diagnoses     Elevated BP without diagnosis of hypertension            Three month trial diet and lifestyle modifications, blood partial also likely elevated due to recent weight gain  No changes to medications at this time, follow-up with psychiatry    Recommend repeat labs in 3-4 months, if labs persistently elevated, discussed that I would recommend medications  SUBJECTIVE:  Jonnie Breaux is a 76 y o  female who presents today with a chief complaint of Follow-up  Pt has been on Zyprexa 2 5mg and Zoloft 50mg per psychiatry since Jan 2019 for anxiety, depression, OCD and has noticed a 20-30 pound weight gain  Patient had labs done for prior to [facility] surgery tomorrow  She has had an increase in HLD with elevated panel, A1c has worsened from pre-diabetic range to 7 4%  She has also having some urinary frequency and incontinence  She has subclinical hypothyroidism, not on any meds    Review of Systems   Endocrine: Positive for polyuria  Musculoskeletal: Positive for back pain  Psychiatric/Behavioral: Positive for dysphoric mood and sleep disturbance  The patient is nervous/anxious  I have reviewed the patient's Past Medical History  Current Outpatient Medications:     acetaminophen (TYLENOL) 500 mg tablet, Take 500 mg by mouth every 8 (eight) hours as needed for mild pain, Disp: , Rfl:     OLANZapine (ZyPREXA) 2 5 mg tablet, Take 1 tablet (2 5 mg total) by mouth daily at bedtime, Disp: 90 tablet, Rfl: 2    sertraline (ZOLOFT) 50 mg tablet, Take 1 tablet (50 mg total) by mouth daily, Disp: 90 tablet, Rfl: 1    OBJECTIVE:  /80   Pulse 86   Temp (!) 96 5 °F (35 8 °C)   Resp 16   Ht 5' 5" (1 651 m)   Wt 72 6 kg (160 lb)   SpO2 97%   BMI 26 63 kg/m²      I have spent 26 minutes with Patient today in which greater than 50% of this time was spent in counseling/coordination of care regarding Risks and benefits of tx options, Instructions for management, Patient and family education, Importance of treatment compliance and Impressions  Vernon De Los Santos MD    Note: Portions of the record have been created with voice recognition software  Occasional wrong word or "sound a like" substitutions may have occurred due to the inherent limitations of voice recognition software   Read the chart carefully and recognize, using context, where substitutions have occurred

## 2020-09-15 ENCOUNTER — DOCUMENTATION (OUTPATIENT)
Dept: PSYCHIATRY | Facility: CLINIC | Age: 75
End: 2020-09-15

## 2020-09-15 ENCOUNTER — HOSPITAL ENCOUNTER (OUTPATIENT)
Facility: AMBULARY SURGERY CENTER | Age: 75
Setting detail: OUTPATIENT SURGERY
Discharge: HOME | End: 2020-09-15
Attending: SURGERY | Admitting: SURGERY
Payer: COMMERCIAL

## 2020-09-15 ENCOUNTER — ANESTHESIA (OUTPATIENT)
Dept: PERIOP | Facility: AMBULARY SURGERY CENTER | Age: 75
End: 2020-09-15
Payer: COMMERCIAL

## 2020-09-15 VITALS
BODY MASS INDEX: 26.33 KG/M2 | HEART RATE: 66 BPM | DIASTOLIC BLOOD PRESSURE: 77 MMHG | SYSTOLIC BLOOD PRESSURE: 170 MMHG | TEMPERATURE: 98 F | RESPIRATION RATE: 16 BRPM | HEIGHT: 65 IN | OXYGEN SATURATION: 98 % | WEIGHT: 158 LBS

## 2020-09-15 VITALS — HEART RATE: 72 BPM

## 2020-09-15 DIAGNOSIS — C44.219 BASAL CELL CARCINOMA (BCC) OF LEFT EAR: Primary | ICD-10-CM

## 2020-09-15 LAB — GLUCOSE SERPL-MCNC: 138 MG/DL (ref 65–140)

## 2020-09-15 PROCEDURE — 82948 REAGENT STRIP/BLOOD GLUCOSE: CPT

## 2020-09-15 PROCEDURE — 14060 TIS TRNFR E/N/E/L 10 SQ CM/<: CPT | Performed by: SURGERY

## 2020-09-15 PROCEDURE — 15260 FTH/GFT FR N/E/E/L 20 SQCM/<: CPT | Performed by: SURGERY

## 2020-09-15 PROCEDURE — 99024 POSTOP FOLLOW-UP VISIT: CPT | Performed by: PHYSICIAN ASSISTANT

## 2020-09-15 RX ORDER — ONDANSETRON 2 MG/ML
INJECTION INTRAMUSCULAR; INTRAVENOUS AS NEEDED
Status: DISCONTINUED | OUTPATIENT
Start: 2020-09-15 | End: 2020-09-15

## 2020-09-15 RX ORDER — CEFAZOLIN SODIUM 1 G/50ML
1000 SOLUTION INTRAVENOUS ONCE
Status: COMPLETED | OUTPATIENT
Start: 2020-09-15 | End: 2020-09-15

## 2020-09-15 RX ORDER — MAGNESIUM HYDROXIDE 1200 MG/15ML
LIQUID ORAL AS NEEDED
Status: DISCONTINUED | OUTPATIENT
Start: 2020-09-15 | End: 2020-09-15 | Stop reason: HOSPADM

## 2020-09-15 RX ORDER — GINSENG 100 MG
CAPSULE ORAL AS NEEDED
Status: DISCONTINUED | OUTPATIENT
Start: 2020-09-15 | End: 2020-09-15 | Stop reason: HOSPADM

## 2020-09-15 RX ORDER — SODIUM CHLORIDE, SODIUM LACTATE, POTASSIUM CHLORIDE, CALCIUM CHLORIDE 600; 310; 30; 20 MG/100ML; MG/100ML; MG/100ML; MG/100ML
INJECTION, SOLUTION INTRAVENOUS CONTINUOUS PRN
Status: DISCONTINUED | OUTPATIENT
Start: 2020-09-15 | End: 2020-09-15

## 2020-09-15 RX ORDER — FENTANYL CITRATE/PF 50 MCG/ML
25 SYRINGE (ML) INJECTION
Status: DISCONTINUED | OUTPATIENT
Start: 2020-09-15 | End: 2020-09-15 | Stop reason: HOSPADM

## 2020-09-15 RX ORDER — EPHEDRINE SULFATE 50 MG/ML
INJECTION INTRAVENOUS AS NEEDED
Status: DISCONTINUED | OUTPATIENT
Start: 2020-09-15 | End: 2020-09-15

## 2020-09-15 RX ORDER — LIDOCAINE HYDROCHLORIDE 10 MG/ML
INJECTION, SOLUTION EPIDURAL; INFILTRATION; INTRACAUDAL; PERINEURAL AS NEEDED
Status: DISCONTINUED | OUTPATIENT
Start: 2020-09-15 | End: 2020-09-15

## 2020-09-15 RX ORDER — TRAMADOL HYDROCHLORIDE 50 MG/1
50 TABLET ORAL EVERY 6 HOURS PRN
Qty: 5 TABLET | Refills: 0 | Status: SHIPPED | OUTPATIENT
Start: 2020-09-15 | End: 2021-05-18

## 2020-09-15 RX ORDER — PROPOFOL 10 MG/ML
INJECTION, EMULSION INTRAVENOUS CONTINUOUS PRN
Status: DISCONTINUED | OUTPATIENT
Start: 2020-09-15 | End: 2020-09-15

## 2020-09-15 RX ORDER — ACETAMINOPHEN 325 MG/1
650 TABLET ORAL EVERY 6 HOURS
Status: DISCONTINUED | OUTPATIENT
Start: 2020-09-15 | End: 2020-09-15 | Stop reason: HOSPADM

## 2020-09-15 RX ORDER — SODIUM CHLORIDE, SODIUM LACTATE, POTASSIUM CHLORIDE, CALCIUM CHLORIDE 600; 310; 30; 20 MG/100ML; MG/100ML; MG/100ML; MG/100ML
125 INJECTION, SOLUTION INTRAVENOUS CONTINUOUS
Status: DISCONTINUED | OUTPATIENT
Start: 2020-09-15 | End: 2020-09-15 | Stop reason: HOSPADM

## 2020-09-15 RX ORDER — ONDANSETRON 2 MG/ML
4 INJECTION INTRAMUSCULAR; INTRAVENOUS ONCE AS NEEDED
Status: DISCONTINUED | OUTPATIENT
Start: 2020-09-15 | End: 2020-09-15 | Stop reason: HOSPADM

## 2020-09-15 RX ORDER — FENTANYL CITRATE 50 UG/ML
INJECTION, SOLUTION INTRAMUSCULAR; INTRAVENOUS AS NEEDED
Status: DISCONTINUED | OUTPATIENT
Start: 2020-09-15 | End: 2020-09-15

## 2020-09-15 RX ORDER — PROPOFOL 10 MG/ML
INJECTION, EMULSION INTRAVENOUS AS NEEDED
Status: DISCONTINUED | OUTPATIENT
Start: 2020-09-15 | End: 2020-09-15

## 2020-09-15 RX ORDER — DEXAMETHASONE SODIUM PHOSPHATE 10 MG/ML
INJECTION, SOLUTION INTRAMUSCULAR; INTRAVENOUS AS NEEDED
Status: DISCONTINUED | OUTPATIENT
Start: 2020-09-15 | End: 2020-09-15

## 2020-09-15 RX ADMIN — PROPOFOL 120 MCG/KG/MIN: 10 INJECTION, EMULSION INTRAVENOUS at 15:37

## 2020-09-15 RX ADMIN — CEFAZOLIN SODIUM 1000 MG: 1 SOLUTION INTRAVENOUS at 15:33

## 2020-09-15 RX ADMIN — ONDANSETRON 4 MG: 2 INJECTION INTRAMUSCULAR; INTRAVENOUS at 15:49

## 2020-09-15 RX ADMIN — PROPOFOL 150 MG: 10 INJECTION, EMULSION INTRAVENOUS at 15:35

## 2020-09-15 RX ADMIN — EPHEDRINE SULFATE 10 MG: 50 INJECTION, SOLUTION INTRAVENOUS at 15:43

## 2020-09-15 RX ADMIN — FENTANYL CITRATE 50 MCG: 50 INJECTION, SOLUTION INTRAMUSCULAR; INTRAVENOUS at 15:40

## 2020-09-15 RX ADMIN — FENTANYL CITRATE 50 MCG: 50 INJECTION, SOLUTION INTRAMUSCULAR; INTRAVENOUS at 16:20

## 2020-09-15 RX ADMIN — DEXAMETHASONE SODIUM PHOSPHATE 4 MG: 10 INJECTION, SOLUTION INTRAMUSCULAR; INTRAVENOUS at 15:40

## 2020-09-15 RX ADMIN — SODIUM CHLORIDE, SODIUM LACTATE, POTASSIUM CHLORIDE, AND CALCIUM CHLORIDE: .6; .31; .03; .02 INJECTION, SOLUTION INTRAVENOUS at 15:32

## 2020-09-15 RX ADMIN — LIDOCAINE HYDROCHLORIDE 50 MG: 10 INJECTION, SOLUTION EPIDURAL; INFILTRATION; INTRACAUDAL; PERINEURAL at 15:35

## 2020-09-15 NOTE — ANESTHESIA POSTPROCEDURE EVALUATION
Post-Op Assessment Note    CV Status:  Stable  Pain Score: 0    Pain management: adequate     Mental Status:  Alert and awake   Hydration Status:  Euvolemic   PONV Controlled:  Controlled   Airway Patency:  Patent      Post Op Vitals Reviewed: Yes      Staff: CRNA         No complications documented      BP  164/70   Temp      Pulse  77   Resp   16   SpO2   98 RA

## 2020-09-15 NOTE — INTERVAL H&P NOTE
H&P reviewed  After examining the patient I find no changes in the patients condition since the H&P had been written      Vitals:    09/15/20 1501   Pulse: 68   Resp: 18   Temp: 97 7 °F (36 5 °C)   SpO2: 99%

## 2020-09-15 NOTE — H&P
Assessment/Plan:  Ple staged procedure utilizing postauricular flap ase see HPI  She has basal cell carcinoma of the superior pole of the left ear and will be undergoing Mohs with Dr Елена Gross  At today's visit we discussed potential options for reconstruction including full-thickness skin grafting, local/sedation tissue transfer techniques including helical rim advancement, etc   We also talked about the possibility of a staged technique utilizing a postauricular scalp flap  We talked about the different techniques, potential risks, complications limitations  At this point, she feels that she would prefer to avoid complicated reconstruction would be happy with local wound closure  We will discuss this following the Mohs procedure and determine the reconstructive modality at that time  She understands,         There are no diagnoses linked to this encounter        Subjective:  Basal cell carcinoma      Patient ID: Praneeth Doty is a 76 y o  female      HPI Vilma Nevarez is a nice 20-year-old lady, previously known to me, who returns to discuss options for reconstruction of a Mohs defect of the left ear where she has a basal cell carcinoma  She has been referred by Dr Елнеа Gross      The following portions of the patient's history were reviewed and updated as appropriate: allergies, current medications, past family history, past medical history, past social history, past surgical history and problem list      Review of Systems   Constitutional: Negative for chills and fever  HENT: Positive for hearing loss  Eyes: Positive for visual disturbance  Negative for discharge  Respiratory: Negative for chest tightness and shortness of breath  Cardiovascular: Negative for chest pain and leg swelling  Genitourinary: Negative for dysuria  Musculoskeletal: Negative for gait problem  Skin: Negative for rash  Allergic/Immunologic: Negative for immunocompromised state  Neurological: Negative for seizures and headaches  Hematological: Does not bruise/bleed easily  Psychiatric/Behavioral: The patient is nervous/anxious            Objective:        Temp 98 °F (36 7 °C) (Temporal)   Resp 16   Ht 5' 5"   Wt 71 kg (156 lb 9 6 oz)   BMI 26 06 kg/m²             Physical Exam   HENT:   Head: Normocephalic  Nose: Nose normal    Mouth/Throat: Mucous membranes are moist    Eyes: Pupils are equal, round, and reactive to light  Neck: Normal range of motion  Neck supple  Cardiovascular: Normal rate  Pulmonary/Chest: Effort normal    Abdominal: Soft  Normal appearance  Musculoskeletal: Normal range of motion  Neurological: She is alert  Skin: Skin is warm     Psychiatric: Mood normal

## 2020-09-15 NOTE — INTERIM OP NOTE
EAR MOHS DEFECT RECONSTRUCTION, EAR FULL THICKNESS SKIN GRAFT (FTSG)  Postoperative Note  PATIENT NAME: Marycarmen Jordan  : 1945  MRN: [id number]  [facility]    Surgery Date: 9/15/2020    Preop Diagnosis:  Basal cell carcinoma, ear, left [C44 219]    Post-Op Diagnosis Codes:      * Basal cell carcinoma, ear, left [C44 219]    Procedure(s) (LRB):  EAR MOHS DEFECT RECONSTRUCTION (Left)  EAR FULL THICKNESS SKIN GRAFT (FTSG) (Left)    Surgeon(s) and Role:     * Gladys Srinivasan MD - Primary     * George Lyman - Assisting     * Almaz Yusuf MD - Assisting    Specimens:  * No specimens in log *    Estimated Blood Loss:   Minimal    Anesthesia Type:   General/LMA     Findings:    None  Complications:   None    SIGNATURE: Christopher Cooper PA-C   DATE: September 15, 2020   TIME: 4:19 PM

## 2020-09-15 NOTE — DISCHARGE INSTRUCTIONS
[facility]  Dr Bobbi Huynh   [address] [facility] [address], [address]  Phone: [phone]     Postoperative Instructions for Outpatient Surgery     These instructions are being provided by your doctor to give you basic guidelines during your post-op recovery  Please let our office know if your contact information has changed       Please call the office today for an appointment on Monday 9/21 for a dressing change  Dressings: Keep left ear dressing dry  Left neck with steri strips, replace if they fall off       Activity Restrictions: Nothing strenuous for 48 hours       Bathing:  May bathe tomorrow evening  Keep ear dressing dry  Pat steri strips dry afterwards       Medications:    Resume pre-op medications  You may take tylenol, aleve, or ibuprofen for pain control             Ultram as needed for pain  Other: Elevate head of bed at night to sleep over the next 48 hours  Ice to neck area at 10 minute intervals as needed for pain or swelling

## 2020-09-16 ENCOUNTER — OFFICE VISIT (OUTPATIENT)
Dept: PSYCHIATRY | Facility: CLINIC | Age: 75
End: 2020-09-16
Payer: COMMERCIAL

## 2020-09-16 DIAGNOSIS — F42.9 OBSESSIVE-COMPULSIVE DISORDER, UNSPECIFIED TYPE: Primary | ICD-10-CM

## 2020-09-16 DIAGNOSIS — F33.41 RECURRENT MAJOR DEPRESSIVE DISORDER, IN PARTIAL REMISSION (HCC): ICD-10-CM

## 2020-09-16 DIAGNOSIS — R56.9 SEIZURE-LIKE ACTIVITY (HCC): ICD-10-CM

## 2020-09-16 DIAGNOSIS — E03.9 HYPOTHYROIDISM, UNSPECIFIED TYPE: ICD-10-CM

## 2020-09-16 DIAGNOSIS — F32.3 MAJOR DEPRESSION WITH PSYCHOTIC FEATURES (HCC): ICD-10-CM

## 2020-09-16 PROCEDURE — 90833 PSYTX W PT W E/M 30 MIN: CPT | Performed by: PSYCHIATRY & NEUROLOGY

## 2020-09-16 PROCEDURE — 99214 OFFICE O/P EST MOD 30 MIN: CPT | Performed by: PSYCHIATRY & NEUROLOGY

## 2020-09-16 RX ORDER — SERTRALINE HYDROCHLORIDE 100 MG/1
100 TABLET, FILM COATED ORAL DAILY
Qty: 90 TABLET | Refills: 1 | Status: SHIPPED | OUTPATIENT
Start: 2020-09-16 | End: 2021-05-18

## 2020-09-16 NOTE — PATIENT INSTRUCTIONS
Major depression with psychotic features (HCC)  -     sertraline (ZOLOFT) 100 mg tablet;  Take 1 tablet (100 mg total) by mouth daily      discontinue zyprexa    Follow up in 3 months

## 2020-09-16 NOTE — BH TREATMENT PLAN
TREATMENT PLAN (Medication Management Only)        [facility]    Name and Date of Birth:  Brigitte Holcomb 76 y o  1945  Date of Treatment Plan: September 16, 2020  Diagnosis/Diagnoses:    1  Major depression with psychotic features [facility])      Strengths/Personal Resources for Self-Care: "i keep on going  I do not take personally anymore  life is chance and I do not blame myself or God "  Area/Areas of need (in own words): OCD symptoms   1  Long Term Goal: to be healthier and loss weight  Target Date: 2 months - 11/16/2020  Person/Persons responsible for completion of goal: Katia Leach, Dr Thais Mcmillan  2  Short Term Objective (s) - How will we reach this goal?:   A  Provider new recommended medication/dosage changes and/or continue medication(s): Medication changes: I have discontinued Hilda Piper's OLANZapine  I have also changed her sertraline  Additionally, I am having her maintain her acetaminophen and traMADol     B  N/A   C  N/A  Target Date: 6 months - 3/16/2021  Person/Persons Responsible for Completion of Goal: Dr Thais Washington  Progress Towards Goals: continuing treatment  Treatment Modality: medication management every 3 months  Review due 90 to 120 days from date of this plan: 6 months  Expected length of service: ongoing treatment  My Physician/PA/NP and I have developed this plan together and I agree to work on the goals and objectives  I understand the treatment goals that were developed for my treatment

## 2020-09-18 ENCOUNTER — TELEMEDICINE (OUTPATIENT)
Dept: FAMILY MEDICINE CLINIC | Facility: CLINIC | Age: 75
End: 2020-09-18
Payer: COMMERCIAL

## 2020-09-18 DIAGNOSIS — E03.8 SUBCLINICAL HYPOTHYROIDISM: Primary | ICD-10-CM

## 2020-09-18 PROCEDURE — 99441 PR PHYS/QHP TELEPHONE EVALUATION 5-10 MIN: CPT | Performed by: FAMILY MEDICINE

## 2020-09-18 NOTE — ASSESSMENT & PLAN NOTE
Lab Results   Component Value Date    [id number] 7 320 (H) 08/12/2020     Normal FT4-0 81    Reassured patient that she does not have clinical hypothyroidism, her thyroid function, as measured by her FT4, is normal and thyroid replacement is not indicated, she does not need medication for her thyroid   Would just need routine lab monitoring q6months, can check thyroid Ab with next labs as well

## 2020-09-18 NOTE — PROGRESS NOTES
Virtual Brief Visit    Assessment/Plan:    Problem List Items Addressed This Visit        Endocrine    Subclinical hypothyroidism - Primary     Lab Results   Component Value Date    [id number] 7 320 (H) 08/12/2020     Normal FT4-0 81    Reassured patient that she does not have clinical hypothyroidism, her thyroid function, as measured by her FT4, is normal and thyroid replacement is not indicated, she does not need medication for her thyroid  Would just need routine lab monitoring q6months, can check thyroid Ab with next labs as well                     Reason for visit is   Chief Complaint   Patient presents with    Virtual Brief Visit      Encounter provider Ryan Swift MD    Provider located at [place] [address]  [phone]    Recent Visits  Date Type Provider Dept   09/14/20 Office Visit Ryan Swift MD [address] recent visits within past 7 days and meeting all other requirements     Today's Visits  Date Type Provider Dept   09/18/20 Telemedicine Elliott Swift MD [facility] today's visits and meeting all other requirements     Future Appointments  No visits were found meeting these conditions  Showing future appointments within next 150 days and meeting all other requirements        After connecting through telephone, the patient was identified by name and date of birth  Jerry Vernonia was informed that this is a telemedicine visit and that the visit is being conducted through telephone  My office door was closed  No one else was in the room  She acknowledged consent and understanding of privacy and security of the platform  The patient has agreed to participate and understands she can discontinue the visit at any time  Patient is aware this is a billable service  Majo Jones is a 76 y o  female presents to discuss abnormal thyroid tests  TSH 7 320 and FT4- 0 81   Patient saw her psychiatrist this week who told her to call PCP to get started on thyroid medication for abnormal TSH  Past Medical History:   Diagnosis Date    Alopecia areata     Anxiety     Asthma     Basal cell carcinoma (BCC) in situ of skin     Depression     Diabetes mellitus (HCC)     Fibromyalgia, primary     H/O migraine     improved since menopause    Hashimoto's disease     Hyperlipidemia     Incontinence of urine     PONV (postoperative nausea and vomiting)        Past Surgical History:   Procedure Laterality Date    FLAP LOCAL HEAD / NECK N/A 5/29/2019    Procedure: RECONSTRUCTION BASAL CELL CARCINOMA DEFECTS OF LEFT TEMPLE, VERTEX, ANTERIOR SCALP AND LEFT EAR;  Surgeon: Amelia Guzman MD;  [facility];  Service: Plastics    FULL THICKNESS SKIN GRAFT Left 9/15/2020    Procedure: EAR FULL THICKNESS SKIN GRAFT (FTSG);   Surgeon: Amelia Guzman MD;  [facility];  Service: Plastics    LITHOTRIPSY      Renal    MOHS RECONSTRUCTION Left 9/15/2020    Procedure: EAR MOHS DEFECT RECONSTRUCTION;  Surgeon: Amelia Guzman MD;  [facility];  Service: Plastics    NE FULL THICK [facility] HEAD,FAC,HAND <20SQC N/A 5/29/2019    Procedure: FTSG LEFT EAR;  Surgeon: Amelia Guzman MD;  [facility];  Service: Plastics    RECTAL VAGINAL FISTULECTOMY      SKIN BIOPSY      SKIN LESION EXCISION N/A 5/29/2019    Procedure: WIDE EXCISION BASAL CELL CARCINOMA VERTEX OF SCALP, LEFT EAR AND LEFT TEMPLE;  Surgeon: Heber White MD;  [facility];  Service: Surgical Oncology    SPLIT THICKNESS SKIN GRAFT N/A 5/29/2019    Procedure: FLAP RECONSTRUCTION OF LEFT TEMPLE, STSG TO SCALP X2;  Surgeon: Amelia Guzman MD;  [facility];  Service: Plastics    TONSILLECTOMY      with adenoidectomy       Current Outpatient Medications   Medication Sig Dispense Refill    acetaminophen (TYLENOL) 500 mg tablet Take 500 mg by mouth every 8 (eight) hours as needed for mild pain      sertraline (ZOLOFT) 100 mg tablet Take 1 tablet (100 mg total) by mouth daily 90 tablet 1    traMADol (ULTRAM) 50 mg tablet Take 1 tablet (50 mg total) by mouth every 6 (six) hours as needed for moderate pain for up to 5 doses 5 tablet 0     No current facility-administered medications for this visit  Allergies   Allergen Reactions    Amoxicillin Diarrhea    Bee Venom     Clindamycin GI Intolerance    Flu Virus Vaccine     Lisinopril     Sulfites Throat Swelling       Review of Systems   Constitutional: Negative for fatigue  Endocrine: Negative for cold intolerance  Psychiatric/Behavioral: Positive for dysphoric mood  The patient is nervous/anxious  There were no vitals filed for this visit  I have spent 9 minutes with Patient today in which greater than 50% of this time was spent in counseling/coordination of care regarding Risks and benefits of tx options, Instructions for management, Patient and family education, Importance of treatment compliance and Impressions  It was my intent to perform this visit via video technology but the patient was not able to do a video connection so the visit was completed via audio telephone only  VIRTUAL VISIT DISCLAIMER    Silvestre Courtney acknowledges that she has consented to an online visit or consultation  She understands that the online visit is based solely on information provided by her, and that, in the absence of a face-to-face physical evaluation by the physician, the diagnosis she receives is both limited and provisional in terms of accuracy and completeness  This is not intended to replace a full medical face-to-face evaluation by the physician  Silvestre Courtney understands and accepts these terms

## 2020-09-18 NOTE — OP NOTE
OPERATIVE REPORT  PATIENT NAME: Tamika Roper    :    MRN: [id number]  Pt Location: [facility]    SURGERY DATE: 9/15/2020    Surgeon(s) and Role:     * Janice Solorzano MD - Primary     * Clent Lock, [place] - Assisting     * Harry Leone MD - Assisting    Preop Diagnosis:  Basal cell carcinoma, ear, left [C44 219]    Post-Op Diagnosis Codes: * Basal cell carcinoma, ear, left [C44 219]    Procedure 1 preparation of recipient site/Mohs defect of left ear by excision of wound margins and scar to prepare for reconstruction () 2  Adjacent tissue transfer/local flap reconstruction left ear helical rim 3 5 cm x 1 cm 3  Full-thickness skin graft reconstruction left ear defect 3 cm x 1 5 cm  Specimen(s):  * No specimens in log *    Estimated Blood Loss:   Minimal    Drains:  * No LDAs found *    Anesthesia Type:   General/LMA    Operative Indications:  Basal cell carcinoma, ear, left [C44 219]  Status post Mohs    Operative Findings: Mohs defect left ear    Complications:   None    Procedure and Technique:  Juana Pryor was seen preoperatively in the holding area, the surgical site was marked with her participation, and we reviewed the planned procedure as well as potential risks, complications limitations  She was taken to the operating room and underwent induction of anesthesia by the anesthesia personnel  The operative field was prepped and draped in sterile fashion and a proper time-out was performed  2 5 loupe magnification was used to aid visualization  Local anesthesia was then administered as a ring block  Given the significant defect, and degree of missing tissue, the helical rim was reconstructed utilizing adjacent tissue transfer/local flap techniques    The flap was elevated from the scalp utilizing a spreading technique with tenotomy scissors, hemostasis was assured and the flap was inset along the posterior aspect of the helical rim utilizing multiple 5 0 chromic and 5 O Vicryl sutures  Once this flap reconstruction had been completed to recreate helical rim, remainder of the defect was reconstructed with a full-thickness skin graft  The graft was harvested from the lateral neck utilizing a 15 blade, it was defatted on the back table and inset into the defect  It was secured utilizing 5 0 chromic sutures, base Ting sutures were placed  The graft was protected with sterile foam which was coated in bacitracin this was secured with 5 0 silk bolster type sutures  The donor site was closed with 4-0 PDS sutures buried at the level of the deep dermis this was followed by running subcuticular 5 O Monocryl  Benzoin and Steri-Strips were then applied the patient was transferred to the recovery room     I was present for the entire procedure    Patient Disposition:  PACU     SIGNATURE: Sandrita Watters MD  DATE: September 18, 2020  TIME: 9:16 AM

## 2020-09-21 ENCOUNTER — OFFICE VISIT (OUTPATIENT)
Dept: PLASTIC SURGERY | Facility: CLINIC | Age: 75
End: 2020-09-21

## 2020-09-21 DIAGNOSIS — C44.219 BASAL CELL CARCINOMA (BCC) OF LEFT EAR: Primary | ICD-10-CM

## 2020-09-21 PROCEDURE — 99024 POSTOP FOLLOW-UP VISIT: CPT | Performed by: PHYSICIAN ASSISTANT

## 2020-09-21 NOTE — LETTER
September 21, 2020     Bo Bravo MD  [address] [place] [address] [address] [facility] [address]    Patient: Cas Valdivia   YOB: 1945   Date of Visit: 9/21/2020       Dear Dr [place]: Thank you for referring Radha Ware to me for evaluation  Below are my notes for this consultation  If you have questions, please do not hesitate to call me  I look forward to following your patient along with you  Sincerely,        Hector Justice PA-C        CC: DO Hector Heredia [place]  9/21/2020  3:46 PM  Sign when Signing Visit  Assessment/Plan:   Baljinder Mead is a 19-year-old female who is 6 days status post reconstruction of a Mohs defect of the left ear with full-thickness skin graft done in conjunction with Dr Tamiko Tamez  Please see HPI  She was given instructions on how to care for the graft site  We will see her back in 1 week for donor site suture removal      Diagnoses and all orders for this visit:    Basal cell carcinoma (BCC) of left ear          Subjective:     Patient ID: Cas Valdivia is a 76 y o  female  HPI   She is feeling well  She denies any complaints regarding her left ear  Review of Systems   Skin:        As per HPI  Objective:     Physical Exam  Skin:     Comments: Left ear graft site is adhered and viable  Please see photo  The left neck donor site with Steri-Strips intact  Graft site bolster removed

## 2020-09-21 NOTE — PROGRESS NOTES
Assessment/Plan:   Kathy Arreola is a 77-year-old female who is 6 days status post reconstruction of a Mohs defect of the left ear with full-thickness skin graft done in conjunction with Dr Ky Brown  Please see HPI  She was given instructions on how to care for the graft site  We will see her back in 1 week for donor site suture removal      Diagnoses and all orders for this visit:    Basal cell carcinoma (BCC) of left ear          Subjective:     Patient ID: Azeem Carrasco is a 76 y o  female  HPI   She is feeling well  She denies any complaints regarding her left ear  Review of Systems   Skin:        As per HPI  Objective:     Physical Exam  Skin:     Comments: Left ear graft site is adhered and viable  Please see photo  The left neck donor site with Steri-Strips intact  Graft site bolster removed

## 2020-09-23 ENCOUNTER — TELEPHONE (OUTPATIENT)
Dept: SURGICAL ONCOLOGY | Facility: CLINIC | Age: 75
End: 2020-09-23

## 2020-09-25 ENCOUNTER — OFFICE VISIT (OUTPATIENT)
Dept: SURGICAL ONCOLOGY | Facility: CLINIC | Age: 75
End: 2020-09-25
Payer: COMMERCIAL

## 2020-09-25 VITALS
BODY MASS INDEX: 26.48 KG/M2 | HEART RATE: 77 BPM | DIASTOLIC BLOOD PRESSURE: 80 MMHG | HEIGHT: 65 IN | SYSTOLIC BLOOD PRESSURE: 124 MMHG | WEIGHT: 158.9 LBS | TEMPERATURE: 98.6 F

## 2020-09-25 DIAGNOSIS — F32.3 MAJOR DEPRESSION WITH PSYCHOTIC FEATURES (HCC): ICD-10-CM

## 2020-09-25 DIAGNOSIS — C44.310 BASAL CELL CARCINOMA (BCC) OF SKIN OF FACE, UNSPECIFIED PART OF FACE: ICD-10-CM

## 2020-09-25 DIAGNOSIS — C44.41 BASAL CELL CARCINOMA (BCC) OF SCALP: ICD-10-CM

## 2020-09-25 DIAGNOSIS — C44.219 BASAL CELL CARCINOMA (BCC) OF LEFT EAR: Primary | ICD-10-CM

## 2020-09-25 PROCEDURE — 3079F DIAST BP 80-89 MM HG: CPT | Performed by: SURGERY

## 2020-09-25 PROCEDURE — 1160F RVW MEDS BY RX/DR IN RCRD: CPT | Performed by: SURGERY

## 2020-09-25 PROCEDURE — 3074F SYST BP LT 130 MM HG: CPT | Performed by: SURGERY

## 2020-09-25 PROCEDURE — 99213 OFFICE O/P EST LOW 20 MIN: CPT | Performed by: SURGERY

## 2020-09-25 PROCEDURE — 1036F TOBACCO NON-USER: CPT | Performed by: SURGERY

## 2020-09-25 NOTE — LETTER
September 25, 2020     Alicia Salmon MD  [address] [facility] [address] [address] [facility] [address]    Patient: Natalie Goins   YOB: 1945   Date of Visit: 9/25/2020       Dear Dr Jimbo Barbour: Thank you for referring Amelia Farley to me for evaluation  Below are my notes for this consultation  If you have questions, please do not hesitate to call me  I look forward to following your patient along with you  Sincerely,        Maria Esther Alexandra MD        CC: DO Allyson Díaz MD Sharyl Cass, MD  9/25/2020 10:33 AM  Incomplete               Surgical Oncology Follow Up       [address] [facility] SURGICAL ONCOLOGY [place] [address] [place] [address]    Natalie Goins  1945  [id number]  [address]  CANCER [facility] [place]  [address] [address]    Diagnoses and all orders for this visit:    Basal cell carcinoma (BCC) of left ear    Basal cell carcinoma (BCC) of scalp    Basal cell carcinoma (BCC) of skin of face, unspecified part of face        No chief complaint on file  Return in about 1 year (around 9/25/2021) for Office Visit, with Mariela Martinez  Oncology History   Basal cell carcinoma (BCC) of scalp   2/19/2019 Initial Diagnosis    Basal cell carcinoma (BCC) - biopsies of vertex, anterior scalp, left temple, and left ear     5/29/2019 Surgery    Excision of four sites of [address] with reconstruction (Dr Byron Nogueira)  - left temple margins involved at multiple points  - anterior scalp margins are clear  - vertex margins are close but clear  - left ear margins are involved at multiple points             Staging:  Basal cell carcinoma at 4 sites  The left ear and left temple had focally positive margins after skin grafting  Treatment history:   Wide excision basal cell carcinoma x4 with skin graft  Current treatment:  Observation  Disease status: CONOR    History of Present Illness:  Patient returns in follow-up of her multiple basal cell carcinomas  She is doing well at this time with no complaints  She had a new basal cell carcinoma removed from her left ear  This has been reconstructed by Plastic surgery  She is otherwise feeling well  Her youngest uncle has recently been diagnosed with melanoma  Review of Systems  Complete ROS Surg Onc:   Complete ROS Surg Onc:   Constitutional: The patient denies new or recent history of general fatigue, no recent weight loss, no change in appetite  Eyes: No complaints of visual problems, no scleral icterus  ENT: no complaints of ear pain, no hoarseness, no difficulty swallowing,  no tinnitus and no new masses in head, oral cavity, or neck  Cardiovascular: No complaints of chest pain, no palpitations, no ankle edema  Respiratory: No complaints of shortness of breath, no cough  Gastrointestinal: No complaints of jaundice, no bloody stools, no pale stools  Genitourinary: No complaints of dysuria, no hematuria, no nocturia, no frequent urination, no urethral discharge  Musculoskeletal: No complaints of weakness, paralysis, joint stiffness or arthralgias  Integumentary: No complaints of rash, no new lesions  Neurological: No complaints of convulsions, no seizures, no dizziness  Hematologic/Lymphatic: No complaints of easy bruising  Endocrine:  No hot or cold intolerance  No polydipsia, polyphagia, or polyuria  Allergy/immunology:  No environmental allergies  No food allergies  Not immunocompromised  Skin:  No pallor or rash  No wound          Patient Active Problem List   Diagnosis    Type 2 diabetes mellitus without complication, without long-term current use of insulin ([address] )    Pure hypercholesterolemia    Fatty infiltration of liver    Mixed obsessional thoughts and acts    Vitamin D deficiency    Osteoporosis    Subclinical hypothyroidism    Basal cell carcinoma (BCC) of scalp    Recurrent major depressive disorder, in partial remission (HCC)    Moderate malnutrition (HCC)    Basal cell carcinoma of skin of face    Basal cell carcinoma (BCC) of left temple region    Basal cell carcinoma (BCC) of left ear     Past Medical History:   Diagnosis Date    Alopecia areata     Anxiety     Asthma     Basal cell carcinoma (BCC) in situ of skin     Depression     Diabetes mellitus (HCC)     Fibromyalgia, primary     H/O migraine     improved since menopause    Hashimoto's disease     Hyperlipidemia     Incontinence of urine     PONV (postoperative nausea and vomiting)      Past Surgical History:   Procedure Laterality Date    FLAP LOCAL HEAD / NECK N/A 5/29/2019    Procedure: RECONSTRUCTION BASAL CELL CARCINOMA DEFECTS OF LEFT TEMPLE, VERTEX, ANTERIOR SCALP AND LEFT EAR;  Surgeon: Tiffanie Jimenez MD;  [facility];  Service: Plastics    FULL THICKNESS SKIN GRAFT Left 9/15/2020    Procedure: EAR FULL THICKNESS SKIN GRAFT (FTSG);   Surgeon: Tiffanie Jimenez MD;  [facility];  Service: Plastics    LITHOTRIPSY      Renal    MOHS RECONSTRUCTION Left 9/15/2020    Procedure: EAR MOHS DEFECT RECONSTRUCTION;  Surgeon: Tiffanie Jimenez MD;  [facility];  Service: Plastics    WA FULL THICK 2011 [facility] HEAD,FAC,HAND <20SQC N/A 5/29/2019    Procedure: FTSG LEFT EAR;  Surgeon: Tiffanie Jimenez MD;  [facility];  Service: Plastics    RECTAL VAGINAL FISTULECTOMY      SKIN BIOPSY      SKIN LESION EXCISION N/A 5/29/2019    Procedure: WIDE EXCISION BASAL CELL CARCINOMA VERTEX OF SCALP, LEFT EAR AND LEFT TEMPLE;  Surgeon: Tarun Venegas MD;  [facility];  Service: Surgical Oncology    SPLIT THICKNESS SKIN GRAFT N/A 5/29/2019    Procedure: FLAP RECONSTRUCTION OF LEFT TEMPLE, STSG TO SCALP X2;  Surgeon: Tiffanie Jimenez MD;  [facility];  Service: Plastics    TONSILLECTOMY      with adenoidectomy     Family History   Adopted: Yes   Problem Relation Age of Onset    ALS Mother Leon Other Mother         Gastrointestinal bleeding    Other Father         Gastrointestinal bleeding    Alcohol abuse Father     Colon cancer Maternal Aunt     Psychosis Cousin      Social History     Socioeconomic History    Marital status:      Spouse name: Not on file    Number of children: 3    Years of education: 12    Highest education level:  Bachelor's 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 , BA, AB, BS)   Occupational History    Occupation: retired   Social Needs    Financial resource strain: Not on file    Food insecurity     Worry: Not on file     Inability: Not on file   [employer] needs     Medical: Not on file     Non-medical: Not on file   Tobacco Use    Smoking status: Former Smoker     Types: Cigarettes    Smokeless tobacco: Never Used    Tobacco comment: quit in 1968   Substance and Sexual Activity    Alcohol use: Not Currently    Drug use: No    Sexual activity: Not Currently   Lifestyle    Physical activity     Days per week: Not on file     Minutes per session: Not on file    Stress: Not on file   Relationships    Social connections     Talks on phone: Not on file     Gets together: Not on file     Attends Baptism service: Not on file     Active member of club or organization: Not on file     Attends meetings of clubs or organizations: Not on file     Relationship status: Not on file    Intimate partner violence     Fear of current or ex partner: Not on file     Emotionally abused: Not on file     Physically abused: Not on file     Forced sexual activity: Not on file   Other Topics Concern    Not on file   Social History Narrative    Not on file       Current Outpatient Medications:     acetaminophen (TYLENOL) 500 mg tablet, Take 500 mg by mouth every 8 (eight) hours as needed for mild pain, Disp: , Rfl:     sertraline (ZOLOFT) 100 mg tablet, Take 1 tablet (100 mg total) by mouth daily, Disp: 90 tablet, Rfl: 1    traMADol (ULTRAM) 50 mg tablet, Take 1 tablet (50 mg total) by mouth every 6 (six) hours as needed for moderate pain for up to 5 doses, Disp: 5 tablet, Rfl: 0  Allergies   Allergen Reactions    Amoxicillin Diarrhea    Bee Venom     Clindamycin GI Intolerance    Flu Virus Vaccine     Lisinopril     Sulfites Throat Swelling     Vitals:    09/25/20 1016   BP: 124/80   Pulse: 77   Temp: 98 6 °F (37 °C)       Physical Exam  Constitutional: General appearance: The Patient is well-developed and well-nourished who appears the stated age in no acute distress  Patient is pleasant and talkative  HEENT:  Normocephalic  Sclerae are anicteric  Mucous membranes are moist  Neck is supple without adenopathy  No JVD  Chest: The lungs are clear to auscultation  Cardiac: Heart is regular rate  Abdomen: Abdomen is soft, non-tender, non-distended and without masses  Extremities: There is no clubbing or cyanosis  There is no edema  Symmetric  Neuro: Grossly nonfocal  Gait is normal      Lymphatic: No evidence of cervical adenopathy bilaterally  Skin: Warm, anicteric  There is no evidence of local recurrence at her skin grafts ice on the scalp  I do not appreciate any obvious recurrence of the temple  Her left ear a has been dressed by Plastic surgery  Psych:  Patient is pleasant and talkative  Breasts:        Pathology:  [unfilled]    Labs:      Imaging  No results found  I reviewed the above laboratory and imaging data  Discussion/Summary: 58-year-old female with multiple basal cell carcinomas  The 2 lesions on the scalp have been completely excised  The lesion at the temple and the ear have focally positive margins  Since they have been reconstructed with Plastic surgery with skin grafts, I have recommended observation  She will continue to follow up with her dermatologist   A given her new family history of melanoma this is important  I will see her again in 1 year  She is agreeable to this  All of her questions were answered

## 2020-09-25 NOTE — PROGRESS NOTES
Surgical Oncology Follow Up       [address] [facility]  CANCER [facility]ARE [facility] SURGICAL ONCOLOGY [place]  [address] [facility]  [place] [address]    Praneeth Gerardigham  1945  [id number]  [address] [facility]  CANCER [facility] SURGICAL ONCOLOGY [address] [place] [address]    Diagnoses and all orders for this visit:    Basal cell carcinoma (BCC) of left ear    Basal cell carcinoma (BCC) of scalp    Basal cell carcinoma (BCC) of skin of face, unspecified part of face        No chief complaint on file  Return in about 1 year (around 9/25/2021) for Office Visit, with Sam Osborne  Oncology History   Basal cell carcinoma (BCC) of scalp   2/19/2019 Initial Diagnosis    Basal cell carcinoma (BCC) - biopsies of vertex, anterior scalp, left temple, and left ear     5/29/2019 Surgery    Excision of four sites of [facility] with reconstruction (Dr Ever Sullivan)  - left temple margins involved at multiple points  - anterior scalp margins are clear  - vertex margins are close but clear  - left ear margins are involved at multiple points             Staging:  Basal cell carcinoma at 4 sites  The left ear and left temple had focally positive margins after skin grafting  Treatment history: Wide excision basal cell carcinoma x4 with skin graft  Current treatment:  Observation  Disease status: CONOR    History of Present Illness:  Patient returns in follow-up of her multiple basal cell carcinomas  She is doing well at this time with no complaints  She had a new basal cell carcinoma removed from her left ear  This has been reconstructed by Plastic surgery  She is otherwise feeling well  Her youngest uncle has recently been diagnosed with melanoma  Review of Systems  Complete ROS Surg Onc:   Complete ROS Surg Onc:   Constitutional: The patient denies new or recent history of general fatigue, no recent weight loss, no change in appetite     Eyes: No complaints of visual problems, no scleral icterus  ENT: no complaints of ear pain, no hoarseness, no difficulty swallowing,  no tinnitus and no new masses in head, oral cavity, or neck  Cardiovascular: No complaints of chest pain, no palpitations, no ankle edema  Respiratory: No complaints of shortness of breath, no cough  Gastrointestinal: No complaints of jaundice, no bloody stools, no pale stools  Genitourinary: No complaints of dysuria, no hematuria, no nocturia, no frequent urination, no urethral discharge  Musculoskeletal: No complaints of weakness, paralysis, joint stiffness or arthralgias  Integumentary: No complaints of rash, no new lesions  Neurological: No complaints of convulsions, no seizures, no dizziness  Hematologic/Lymphatic: No complaints of easy bruising  Endocrine:  No hot or cold intolerance  No polydipsia, polyphagia, or polyuria  Allergy/immunology:  No environmental allergies  No food allergies  Not immunocompromised  Skin:  No pallor or rash  No wound          Patient Active Problem List   Diagnosis    Type 2 diabetes mellitus without complication, without long-term current use of insulin ([address] )    Pure hypercholesterolemia    Fatty infiltration of liver    Mixed obsessional thoughts and acts    Vitamin D deficiency    Osteoporosis    Subclinical hypothyroidism    Basal cell carcinoma (BCC) of scalp    Recurrent major depressive disorder, in partial remission (HCC)    Moderate malnutrition (HCC)    Basal cell carcinoma of skin of face    Basal cell carcinoma (BCC) of left temple region    Basal cell carcinoma (BCC) of left ear     Past Medical History:   Diagnosis Date    Alopecia areata     Anxiety     Asthma     Basal cell carcinoma (BCC) in situ of skin     Depression     Diabetes mellitus (HCC)     Fibromyalgia, primary     H/O migraine     improved since menopause    Hashimoto's disease     Hyperlipidemia     Incontinence of urine     PONV (postoperative nausea and vomiting)      Past Surgical History:   Procedure Laterality Date    FLAP LOCAL HEAD / NECK N/A 5/29/2019    Procedure: RECONSTRUCTION BASAL CELL CARCINOMA DEFECTS OF LEFT TEMPLE, VERTEX, ANTERIOR SCALP AND LEFT EAR;  Surgeon: Charli Bowen MD;  [facility];  Service: Plastics    FULL THICKNESS SKIN GRAFT Left 9/15/2020    Procedure: EAR FULL THICKNESS SKIN GRAFT (FTSG); Surgeon: Charli Bowen MD;  [facility];  Service: Plastics    LITHOTRIPSY      Renal    MOHS RECONSTRUCTION Left 9/15/2020    Procedure: EAR MOHS DEFECT RECONSTRUCTION;  Surgeon: Charli Bowen MD;  [facility];  Service: Plastics    IN FULL THICK 2011 [facility] HEAD,FAC,HAND <20SQC N/A 5/29/2019    Procedure: FTSG LEFT EAR;  Surgeon: Charli Bowen MD;  [facility];  Service: Plastics    RECTAL VAGINAL FISTULECTOMY      SKIN BIOPSY      SKIN LESION EXCISION N/A 5/29/2019    Procedure: WIDE EXCISION BASAL CELL CARCINOMA VERTEX OF SCALP, LEFT EAR AND LEFT TEMPLE;  Surgeon: Hugh Pryor MD;  [facility];  Service: Surgical Oncology    SPLIT THICKNESS SKIN GRAFT N/A 5/29/2019    Procedure: FLAP RECONSTRUCTION OF LEFT TEMPLE, STSG TO SCALP X2;  Surgeon: Charli Bowen MD;  [facility];  Service: Plastics    TONSILLECTOMY      with adenoidectomy     Family History   Adopted: Yes   Problem Relation Age of Onset   Leon ALS Mother     Other Mother         Gastrointestinal bleeding    Other Father         Gastrointestinal bleeding    Alcohol abuse Father     Colon cancer Maternal Aunt     Psychosis Cousin      Social History     Socioeconomic History    Marital status:      Spouse name: Not on file    Number of children: 3    Years of education: 12    Highest education level:  Bachelor's degree (e g , BA, AB, BS)   Occupational History    Occupation: retired   Social Needs    Financial resource strain: Not on file    Food insecurity     Worry: Not on file Inability: Not on file    Transportation needs     Medical: Not on file     Non-medical: Not on file   Tobacco Use    Smoking status: Former Smoker     Types: Cigarettes    Smokeless tobacco: Never Used    Tobacco comment: quit in 1968   Substance and Sexual Activity    Alcohol use: Not Currently    Drug use: No    Sexual activity: Not Currently   Lifestyle    Physical activity     Days per week: Not on file     Minutes per session: Not on file    Stress: Not on file   Relationships    Social connections     Talks on phone: Not on file     Gets together: Not on file     Attends Rastafarian service: Not on file     Active member of club or organization: Not on file     Attends meetings of clubs or organizations: Not on file     Relationship status: Not on file    Intimate partner violence     Fear of current or ex partner: Not on file     Emotionally abused: Not on file     Physically abused: Not on file     Forced sexual activity: Not on file   Other Topics Concern    Not on file   Social History Narrative    Not on file       Current Outpatient Medications:     acetaminophen (TYLENOL) 500 mg tablet, Take 500 mg by mouth every 8 (eight) hours as needed for mild pain, Disp: , Rfl:     sertraline (ZOLOFT) 100 mg tablet, Take 1 tablet (100 mg total) by mouth daily, Disp: 90 tablet, Rfl: 1    traMADol (ULTRAM) 50 mg tablet, Take 1 tablet (50 mg total) by mouth every 6 (six) hours as needed for moderate pain for up to 5 doses, Disp: 5 tablet, Rfl: 0  Allergies   Allergen Reactions    Amoxicillin Diarrhea    Bee Venom     Clindamycin GI Intolerance    Flu Virus Vaccine     Lisinopril     Sulfites Throat Swelling     Vitals:    09/25/20 1016   BP: 124/80   Pulse: 77   Temp: 98 6 °F (37 °C)       Physical Exam  Constitutional: General appearance: The Patient is well-developed and well-nourished who appears the stated age in no acute distress  Patient is pleasant and talkative  HEENT:  Normocephalic  Sclerae are anicteric  Mucous membranes are moist  Neck is supple without adenopathy  No JVD  Chest: The lungs are clear to auscultation  Cardiac: Heart is regular rate  Abdomen: Abdomen is soft, non-tender, non-distended and without masses  Extremities: There is no clubbing or cyanosis  There is no edema  Symmetric  Neuro: Grossly nonfocal  Gait is normal      Lymphatic: No evidence of cervical adenopathy bilaterally  Skin: Warm, anicteric  There is no evidence of local recurrence at her skin grafts ice on the scalp  I do not appreciate any obvious recurrence of the temple  Her left ear a has been dressed by Plastic surgery  Psych:  Patient is pleasant and talkative  Breasts:        Pathology:  [unfilled]    Labs:      Imaging  No results found  I reviewed the above laboratory and imaging data  Discussion/Summary: 58-year-old female with multiple basal cell carcinomas  The 2 lesions on the scalp have been completely excised  The lesion at the temple and the ear have focally positive margins  Since they have been reconstructed with Plastic surgery with skin grafts, I have recommended observation  She will continue to follow up with her dermatologist   A given her new family history of melanoma this is important  I will see her again in 1 year  She is agreeable to this  All of her questions were answered

## 2020-09-28 ENCOUNTER — OFFICE VISIT (OUTPATIENT)
Dept: PLASTIC SURGERY | Facility: CLINIC | Age: 75
End: 2020-09-28

## 2020-09-28 VITALS — HEIGHT: 65 IN | TEMPERATURE: 97.3 F | WEIGHT: 158 LBS | BODY MASS INDEX: 26.33 KG/M2

## 2020-09-28 DIAGNOSIS — Z98.890 POST-OPERATIVE STATE: Primary | ICD-10-CM

## 2020-09-28 PROCEDURE — 99024 POSTOP FOLLOW-UP VISIT: CPT

## 2020-09-28 NOTE — PROGRESS NOTES
Patient S/P Ear MOHS Defect Reconstruction with FTSG  on 9/15/2020  Patient's ear incision is clean, dry and intact  Sutures remain in place, since absorbable, and dressed with Aquaphor and Xeroform  Monocryl loop sutures removed from donor cite  Photographs obtained  Instructed showering is okay but not direct water to incsions  Scar management given to patient to start in 1 week  Patient to follow up in 2 weeks  Patient encouraged to call with questions or concerns

## 2020-10-13 ENCOUNTER — OFFICE VISIT (OUTPATIENT)
Dept: PLASTIC SURGERY | Facility: CLINIC | Age: 75
End: 2020-10-13

## 2020-10-13 VITALS — TEMPERATURE: 98.1 F

## 2020-10-13 DIAGNOSIS — C44.219 BASAL CELL CARCINOMA (BCC) OF LEFT EAR: Primary | ICD-10-CM

## 2020-10-13 PROCEDURE — 99024 POSTOP FOLLOW-UP VISIT: CPT | Performed by: PHYSICIAN ASSISTANT

## 2021-01-21 ENCOUNTER — VBI (OUTPATIENT)
Dept: ADMINISTRATIVE | Facility: OTHER | Age: 76
End: 2021-01-21

## 2021-05-12 ENCOUNTER — RA CDI HCC (OUTPATIENT)
Dept: OTHER | Facility: HOSPITAL | Age: 76
End: 2021-05-12

## 2021-05-12 NOTE — PROGRESS NOTES
[facility]             Chart reviewed, (number of) suggestions sent to provider: 1           Patients insurance company: [employer] (Medicare Advantage only)     DX: E11 [phone] Type 2 diabetes mellitus without complications         DX: E11 9 was used

## 2021-05-17 NOTE — PROGRESS NOTES
FAMILY MEDICINE PROGRESS NOTE    Date of Service: 21  Primary Care Provider:   Gabriele Pan MD       Name: Jerry Haywood       : 1945       Age:75 y o  Sex: female      MRN: [id number]      Chief Complaint:Medicare Wellness Visit (subsequent wellness) and Diabetes Type 2 (follow up)       ASSESSMENT and PLAN:  Jerry Haywood is a 76 y o  female with:     Problem List Items Addressed This Visit        Endocrine    Type 2 diabetes mellitus without complication, without long-term current use of insulin ([facility] [place] [address] )     Lab Results   Component Value Date    HGBA1C 7 4 (H) 2020    HGBA1C 5 9 2019    HGBA1C 6 8 (H) 2019     Lab Results   Component Value Date    GLUF 160 (H) 2020    LDLCALC 234 (H) 2020    CREATININE 0 70 2020     Not currently on medications, though her A1C worsened on previous labs  Due for repeat labs  Relevant Orders    Hemoglobin A1C    Microalbumin / creatinine urine ratio    Basic metabolic panel    Subclinical hypothyroidism     Lab Results   Component Value Date    [id number] 7 320 (H) 2020     Normal FT4-0 81           Relevant Orders    TSH, 3rd generation with Free T4 reflex       Other    Pure hypercholesterolemia     Lab Results   Component Value Date    HDL 55 2020    LDLCALC 234 (H) 2020    TRIG 168 (H) 2020     Lab Results   Component Value Date    CHOLESTEROL 323 (H) 2020    CHOLESTEROL 242 (H) 2019    CHOLESTEROL 205 (H) 2019       Patient has previously declined statin, recommended again today  Reviewed importance of lifestyle to manage cholesterol  Counseled patient on the importance of lifestyle interventions, specifically discussed a whole food, plant based diet low in saturated fat and processed foods  Discussed the importance of a diet that is rich in whole grains, fruits and vegetables, beans and legumes   Will repeat Lipid panel and reconsider statin at follow-up appointment  Relevant Orders    Lipid Panel with Direct LDL reflex    Recurrent major depressive disorder, in partial remission ([facility] [address] )     No longer on medications  Follows with psych           Other Visit Diagnoses     Medicare annual wellness visit, subsequent    -  Primary    Need for hepatitis C screening test        Relevant Orders    Hepatitis C Antibody ([facility])    BMI 25 0-25 9,adult            SUBJECTIVE:  Landen Vo is a 76 y o  female who presents today with a chief complaint of Medicare Wellness Visit (subsequent wellness) and Diabetes Type 2 (follow up)     HPI     Diabetes  She is not currently on medications, most recent A1C in August 2020 was 7 4  She was instructed to trial lifestyle modifications for 3 to 4 months and follow-up  She was previously on medications, but had diarrhea  Basal Cell Carcinoma  She had recent reconstruction of Mohs defect of left ear following basal cell carcinoma  She will follow with Dr Marjan Keller yearly  She had previously had Mohs procedure on her scalp that required grafting  She did have a lot of sun exposure as a child  Depression/OCD  She follows with psychiatry  She did have hospitalization in 2019 for major depression, at that time she was struggling with anticipatory grief with her  on hospice for Creutzfeld-Bimal disease  She was diagnosed with having an episode of psychosis with hallucinations  She was started on Zoloft and Zyprexa at this time  She is no longer on medications  She still follows with psychiatry  Subclinical Hypothyroid  She was treated with synthroid in the past      She has a lot of concerns about getting the COVID vaccine because of history of intolerance to medications, she has a number of different allergies, anaphylaxis, and autoimmune conditions, as well as Rh- blood type  Review of Systems   Constitutional: Negative for activity change, appetite change, fatigue and fever     HENT: Positive for tinnitus  Eyes: Negative for visual disturbance  Respiratory: Negative for shortness of breath  Cardiovascular: Negative for chest pain  I have reviewed the patient's Past Medical History  Current Outpatient Medications:     acetaminophen (TYLENOL) 500 mg tablet, Take 500 mg by mouth every 8 (eight) hours as needed for mild pain, Disp: , Rfl:     OBJECTIVE:  /84 (BP Location: Left arm, Patient Position: Sitting, Cuff Size: Large)   Pulse 68   Temp 97 6 °F (36 4 °C)   Resp 18   Ht 5' 5 25" (1 657 m)   Wt 70 3 kg (155 lb)   SpO2 98%   Breastfeeding No   BMI 25 60 kg/m²    BP Readings from Last 3 Encounters:   05/18/21 152/84   09/25/20 124/80   09/15/20 170/77      Wt Readings from Last 3 Encounters:   05/18/21 70 3 kg (155 lb)   09/28/20 71 7 kg (158 lb)   09/25/20 72 1 kg (158 lb 14 4 oz)      Physical Exam  Constitutional:       General: She is not in acute distress  Appearance: Normal appearance  She is normal weight  She is not ill-appearing or toxic-appearing  HENT:      Head: Normocephalic and atraumatic  Right Ear: External ear normal       Left Ear: External ear normal       Nose: Nose normal       Mouth/Throat:      Mouth: Mucous membranes are moist    Eyes:      Extraocular Movements: Extraocular movements intact  Conjunctiva/sclera: Conjunctivae normal    Neck:      Musculoskeletal: Normal range of motion and neck supple  Cardiovascular:      Rate and Rhythm: Normal rate and regular rhythm  Pulmonary:      Effort: Pulmonary effort is normal  No respiratory distress  Musculoskeletal: Normal range of motion  Skin:     Findings: No erythema or rash  Neurological:      General: No focal deficit present  Mental Status: She is alert and oriented to person, place, and time     Psychiatric:         Mood and Affect: Mood normal          Behavior: Behavior normal          Lab Results   Component Value Date    SODIUM 139 08/12/2020    K 4 3 08/12/2020  08/12/2020    CO2 25 08/12/2020    BUN 14 08/12/2020    CREATININE 0 70 08/12/2020    GLUC 104 10/18/2016    CALCIUM 9 3 08/12/2020     Lab Results   Component Value Date    ALT 27 08/12/2020    AST 19 08/12/2020    ALKPHOS 78 08/12/2020     Lab Results   Component Value Date    HGBA1C 7 4 (H) 08/12/2020     Lab Results   Component Value Date    ZTC2TCFCJEIS 7 320 (H) 08/12/2020     Lab Results   Component Value Date    CHOLESTEROL 323 (H) 08/12/2020    CHOLESTEROL 242 (H) 12/06/2019    CHOLESTEROL 205 (H) 01/22/2019     Lab Results   Component Value Date    HDL 55 08/12/2020    HDL 72 12/06/2019    HDL 77 (H) 01/22/2019     Lab Results   Component Value Date    TRIG 168 (H) 08/12/2020    TRIG 137 12/06/2019    TRIG 69 01/22/2019     Lab Results   Component Value Date    NONHDLC 183 06/29/2018     Lab Results   Component Value Date    LDLCALC 234 (H) 08/12/2020            Return in about 2 months (around 7/18/2021) for follow-up   Shar Christie MD    Note: Portions of the record have been created with voice recognition software  Occasional wrong word or "sound a like" substitutions may have occurred due to the inherent limitations of voice recognition software  Read the chart carefully and recognize, using context, where substitutions have occurred

## 2021-05-17 NOTE — ASSESSMENT & PLAN NOTE
Lab Results   Component Value Date    HDL 55 08/12/2020    LDLCALC 234 (H) 08/12/2020    TRIG 168 (H) 08/12/2020     Lab Results   Component Value Date    CHOLESTEROL 323 (H) 08/12/2020    CHOLESTEROL 242 (H) 12/06/2019    CHOLESTEROL 205 (H) 01/22/2019       Patient has previously declined statin, recommended again today  Reviewed importance of lifestyle to manage cholesterol  Counseled patient on the importance of lifestyle interventions, specifically discussed a whole food, plant based diet low in saturated fat and processed foods  Discussed the importance of a diet that is rich in whole grains, fruits and vegetables, beans and legumes  Will repeat Lipid panel and reconsider statin at follow-up appointment

## 2021-05-17 NOTE — ASSESSMENT & PLAN NOTE
Lab Results   Component Value Date    HGBA1C 7 4 (H) 08/12/2020    HGBA1C 5 9 05/08/2019    HGBA1C 6 8 (H) 01/22/2019     Lab Results   Component Value Date    GLUF 160 (H) 08/12/2020    LDLCALC 234 (H) 08/12/2020    CREATININE 0 70 08/12/2020     Not currently on medications, though her A1C worsened on previous labs  Due for repeat labs

## 2021-05-18 ENCOUNTER — OFFICE VISIT (OUTPATIENT)
Dept: FAMILY MEDICINE CLINIC | Facility: CLINIC | Age: 76
End: 2021-05-18
Payer: COMMERCIAL

## 2021-05-18 VITALS
DIASTOLIC BLOOD PRESSURE: 84 MMHG | HEART RATE: 68 BPM | HEIGHT: 65 IN | SYSTOLIC BLOOD PRESSURE: 152 MMHG | RESPIRATION RATE: 18 BRPM | OXYGEN SATURATION: 98 % | BODY MASS INDEX: 25.83 KG/M2 | WEIGHT: 155 LBS | TEMPERATURE: 97.6 F

## 2021-05-18 DIAGNOSIS — E03.8 SUBCLINICAL HYPOTHYROIDISM: ICD-10-CM

## 2021-05-18 DIAGNOSIS — Z11.59 NEED FOR HEPATITIS C SCREENING TEST: ICD-10-CM

## 2021-05-18 DIAGNOSIS — F33.41 RECURRENT MAJOR DEPRESSIVE DISORDER, IN PARTIAL REMISSION (HCC): ICD-10-CM

## 2021-05-18 DIAGNOSIS — E11.9 TYPE 2 DIABETES MELLITUS WITHOUT COMPLICATION, WITHOUT LONG-TERM CURRENT USE OF INSULIN (HCC): ICD-10-CM

## 2021-05-18 DIAGNOSIS — E78.00 PURE HYPERCHOLESTEROLEMIA: ICD-10-CM

## 2021-05-18 DIAGNOSIS — Z00.00 MEDICARE ANNUAL WELLNESS VISIT, SUBSEQUENT: Primary | ICD-10-CM

## 2021-05-18 PROBLEM — E44.0 MODERATE MALNUTRITION (HCC): Status: RESOLVED | Noted: 2019-01-23 | Resolved: 2021-05-18

## 2021-05-18 PROCEDURE — G0439 PPPS, SUBSEQ VISIT: HCPCS | Performed by: FAMILY MEDICINE

## 2021-05-18 PROCEDURE — 1170F FXNL STATUS ASSESSED: CPT | Performed by: FAMILY MEDICINE

## 2021-05-18 PROCEDURE — 3725F SCREEN DEPRESSION PERFORMED: CPT | Performed by: FAMILY MEDICINE

## 2021-05-18 PROCEDURE — 99214 OFFICE O/P EST MOD 30 MIN: CPT | Performed by: FAMILY MEDICINE

## 2021-05-18 PROCEDURE — 3288F FALL RISK ASSESSMENT DOCD: CPT | Performed by: FAMILY MEDICINE

## 2021-05-18 PROCEDURE — 1125F AMNT PAIN NOTED PAIN PRSNT: CPT | Performed by: FAMILY MEDICINE

## 2021-05-18 NOTE — PROGRESS NOTES
Assessment and Plan:     Problem List Items Addressed This Visit        Endocrine    Type 2 diabetes mellitus without complication, without long-term current use [facility]     Lab Results   Component Value Date    HGBA1C 7 4 (H) 08/12/2020    HGBA1C 5 9 05/08/2019    HGBA1C 6 8 (H) 01/22/2019     Lab Results   Component Value Date    GLUF 160 (H) 08/12/2020    LDLCALC 234 (H) 08/12/2020    CREATININE 0 70 08/12/2020     Not currently on medications, though her A1C worsened on previous labs  Due for repeat labs  Relevant Orders    Hemoglobin A1C    Microalbumin / creatinine urine ratio    Basic metabolic panel    Subclinical hypothyroidism     Lab Results   Component Value Date    [id number] 7 320 (H) 08/12/2020     Normal FT4-0 81           Relevant Orders    TSH, 3rd generation with Free T4 reflex       Other    Pure hypercholesterolemia     Lab Results   Component Value Date    HDL 55 08/12/2020    LDLCALC 234 (H) 08/12/2020    TRIG 168 (H) 08/12/2020     Lab Results   Component Value Date    CHOLESTEROL 323 (H) 08/12/2020    CHOLESTEROL 242 (H) 12/06/2019    CHOLESTEROL 205 (H) 01/22/2019       Patient has previously declined statin, recommended again today  Reviewed importance of lifestyle to manage cholesterol  Counseled patient on the importance of lifestyle interventions, specifically discussed a whole food, plant based diet low in saturated fat and processed foods  Discussed the importance of a diet that is rich in whole grains, fruits and vegetables, beans and legumes  Will repeat Lipid panel and reconsider statin at follow-up appointment                  Relevant Orders    Lipid Panel with Direct LDL reflex    Recurrent major depressive disorder, in partial remission ([facility] [address] )     No longer on medications  Follows with psych           Other Visit Diagnoses     Medicare annual wellness visit, subsequent    -  Primary    Need for hepatitis C screening test        Relevant Orders    Hepatitis C Antibody ([facility])    BMI 25 0-25 9,adult            BMI Counseling: Body mass index is 25 6 kg/m²  The BMI is above normal  Nutrition recommendations include encouraging healthy choices of fruits and vegetables and reducing intake of saturated and trans fat  Exercise recommendations include moderate physical activity 150 minutes/week  Preventive health issues were discussed with patient, and age appropriate screening tests were ordered as noted in patient's After Visit Summary  Personalized health advice and appropriate referrals for health education or preventive services given if needed, as noted in patient's After Visit Summary       History of Present Illness:     Patient presents for Medicare Annual Wellness visit    Patient Care Team:  Raghu Rdz MD as PCP - General (Family Medicine)  Elise Souza MD (Dermatology)  Martín Brooks MD (Oncology)     Problem List:     Patient Active Problem List   Diagnosis    Type 2 diabetes mellitus without complication, without long-term current use of insulin ([facility] [address] )    Pure hypercholesterolemia    Fatty infiltration of liver    Mixed obsessional thoughts and acts    Vitamin D deficiency    Osteoporosis    Subclinical hypothyroidism    Basal cell carcinoma (BCC) of scalp    Recurrent major depressive disorder, in partial remission ([address] )    Basal cell carcinoma of skin of face    Basal cell carcinoma (BCC) of left temple region    Basal cell carcinoma (BCC) of left ear      Past Medical and Surgical History:     Past Medical History:   Diagnosis Date    Alopecia areata     Anxiety     Asthma     Basal cell carcinoma (BCC) in situ of skin     Depression     Diabetes mellitus ([address] )     Fibromyalgia, primary     H/O migraine     improved since menopause    Hashimoto's disease     Hyperlipidemia     Incontinence of urine     PONV (postoperative nausea and vomiting)      Past Surgical History:   Procedure Laterality Date    FLAP LOCAL HEAD / NECK N/A 5/29/2019    Procedure: RECONSTRUCTION BASAL CELL CARCINOMA DEFECTS OF LEFT TEMPLE, VERTEX, ANTERIOR SCALP AND LEFT EAR;  Surgeon: Chuy Moreno MD;  [facility];  Service: Plastics    FULL THICKNESS SKIN GRAFT Left 9/15/2020    Procedure: EAR FULL THICKNESS SKIN GRAFT (FTSG); Surgeon: Chuy Moreno MD;  [facility];  Service: Plastics    LITHOTRIPSY      Renal    MOHS RECONSTRUCTION Left 9/15/2020    Procedure: EAR MOHS DEFECT RECONSTRUCTION;  Surgeon: Chuy Moreno MD;  [facility];  Service: Plastics    ND FULL THICK 2011 [facility] HEAD,FAC,HAND <20SQC N/A 5/29/2019    Procedure: FTSG LEFT EAR;  Surgeon: Chuy Moreno MD;  [facility];  Service: Plastics    RECTAL VAGINAL FISTULECTOMY      SKIN BIOPSY      SKIN LESION EXCISION N/A 5/29/2019    Procedure: WIDE EXCISION BASAL CELL CARCINOMA VERTEX OF SCALP, LEFT EAR AND LEFT TEMPLE;  Surgeon: Seun Small MD;  [facility];  Service: Surgical Oncology    SPLIT THICKNESS SKIN GRAFT N/A 5/29/2019    Procedure: FLAP RECONSTRUCTION OF LEFT TEMPLE, STSG TO SCALP X2;  Surgeon: Chuy Moreno MD;  [facility];  Service: Plastics    TONSILLECTOMY      with adenoidectomy      Family History:     Family History   Adopted: Yes   Problem Relation Age of Onset    ALS Mother     Other Mother         Gastrointestinal bleeding    Other Father         Gastrointestinal bleeding    Alcohol abuse Father     Colon cancer Maternal Aunt     Psychosis Cousin       Social History:        Social History     Socioeconomic History    Marital status:      Spouse name: None    Number of children: 3    Years of education: 12    Highest education level:  Bachelor's degree (e g , BA, AB, BS)   Occupational History    Occupation: retired   Social Needs    Financial resource strain: None    Food insecurity     Worry: None     Inability: None    Transportation needs Medical: None     Non-medical: None   Tobacco Use    Smoking status: Former Smoker     Packs/day: 0 25     Years: 2 00     Pack years: 0 50     Types: Cigarettes    Smokeless tobacco: Never Used    Tobacco comment: quit in 1968   Substance and Sexual Activity    Alcohol use: Not Currently    Drug use: No    Sexual activity: Not Currently   Lifestyle    Physical activity     Days per week: None     Minutes per session: None    Stress: None   Relationships    Social connections     Talks on phone: None     Gets together: None     Attends [employer] service: None     Active member of club or organization: None     Attends meetings of clubs or organizations: None     Relationship status: None    Intimate partner violence     Fear of current or ex partner: None     Emotionally abused: None     Physically abused: None     Forced sexual activity: None   Other Topics Concern    None   Social History Narrative    None      Medications and Allergies:     Current Outpatient Medications   Medication Sig Dispense Refill    acetaminophen (TYLENOL) 500 mg tablet Take 500 mg by mouth every 8 (eight) hours as needed for mild pain       No current facility-administered medications for this visit        Allergies   Allergen Reactions    Amoxicillin Diarrhea    Bee Venom     Clindamycin GI Intolerance    Flu Virus Vaccine     Lisinopril     Sulfites - Food Allergy Throat Swelling      Immunizations:     Immunization History   Administered Date(s) Administered    Pneumococcal Conjugate 13-Valent 07/22/2016    Pneumococcal Polysaccharide PPV23 01/17/2018    Td (adult), adsorbed 05/01/2003, 06/06/2013, 09/14/2013      Health Maintenance:         Topic Date Due    Hepatitis C Screening  Never done    Colorectal Cancer Screening  Never done         Topic Date Due    COVID-19 Vaccine (1) Never done    DTaP,Tdap,and Td Vaccines (1 - Tdap) 10/17/1966      Medicare Health Risk Assessment:     /84 (BP Location: Left arm, Patient Position: Sitting, Cuff Size: Large)   Pulse 68   Temp 97 6 °F (36 4 °C)   Resp 18   Ht 5' 5 25" (1 657 m)   Wt 70 3 kg (155 lb)   SpO2 98%   Breastfeeding No   BMI 25 60 kg/m²      Sandra Lay is here for her Subsequent Wellness visit  Last Medicare Wellness visit information reviewed, patient interviewed and updates made to the record today  Health Risk Assessment:   Patient rates overall health as good  Patient feels that their physical health rating is much better  Patient is satisfied with their life  Eyesight was rated as same  Hearing was rated as slightly worse  Patient feels that their emotional and mental health rating is much better  Patients states they are sometimes angry  Patient states they are sometimes unusually tired/fatigued  Pain experienced in the last 7 days has been some  Patient's pain rating has been 4/10  Patient states that she has experienced no weight loss or gain in last 6 months  Depression Screening:   PHQ-2 Score: 0  PHQ-9 Score: 0      Fall Risk Screening: In the past year, patient has experienced: no history of falling in past year      Urinary Incontinence Screening:   Patient has leaked urine accidently in the last six months  Home Safety:  Patient does not have trouble with stairs inside or outside of their home  Patient has working smoke alarms and has no working carbon monoxide detector  Home safety hazards include: none  Nutrition:   Current diet is Regular  Medications:   Patient is not currently taking any over-the-counter supplements  Patient is able to manage medications  Activities of Daily Living (ADLs)/Instrumental Activities of Daily Living (IADLs):   Walk and transfer into and out of bed and chair?: Yes  Dress and groom yourself?: Yes    Bathe or shower yourself?: Yes    Feed yourself?  Yes  Do your laundry/housekeeping?: Yes  Manage your money, pay your bills and track your expenses?: Yes  Make your own meals?: Yes    Do your own shopping?: Yes    Previous Hospitalizations:   Any hospitalizations or ED visits within the last 12 months?: Yes    How many hospitalizations have you had in the last year?: 1-2    Hospitalization Comments: Mohs procedure and reconstruction  Advance Care Planning:   Living will: Yes    Durable POA for healthcare: Yes    Advanced directive: Yes      PREVENTIVE SCREENINGS      Cardiovascular Screening:    General: Screening Not Indicated and History Lipid Disorder      Diabetes Screening:     General: Screening Not Indicated and History Diabetes      Cervical Cancer Screening:    General: Screening Not Indicated      Osteoporosis Screening:    General: Screening Not Indicated and History Osteoporosis      Lung Cancer Screening:     General: Screening Not Indicated      Hepatitis C Screening:      Hep C Screening Accepted: Yes      Screening, Brief Intervention, and Referral to Treatment (SBIRT)    Screening  Typical number of drinks in a day: 0  Typical number of drinks in a week: 0  Interpretation: Low risk drinking behavior  Single Item Drug Screening:  How often have you used an illegal drug (including marijuana) or a prescription medication for non-medical reasons in the past year? never    Single Item Drug Screen Score: 0  Interpretation: Negative screen for possible drug use disorder    Brief Intervention  Alcohol & drug use screenings were reviewed  No concerns regarding substance use disorder identified  Other Counseling Topics:   Car/seat belt/driving safety, skin self-exam, sunscreen and calcium and vitamin D intake and regular weightbearing exercise         Tamara White MD

## 2021-05-18 NOTE — PATIENT INSTRUCTIONS
Medicare Preventive Visit Patient Instructions  Thank you for completing your Welcome to Medicare Visit or Medicare Annual Wellness Visit today  Your next wellness visit will be due in one year (5/19/2022)  The screening/preventive services that you may require over the next 5-10 years are detailed below  Some tests may not apply to you based off risk factors and/or age  Screening tests ordered at today's visit but not completed yet may show as past due  Also, please note that scanned in results may not display below  Preventive Screenings:  Service Recommendations Previous Testing/Comments   Colorectal Cancer Screening  * Colonoscopy    * Fecal Occult Blood Test (FOBT)/Fecal Immunochemical Test (FIT)  * Fecal DNA/Cologuard Test  * Flexible Sigmoidoscopy Age: 54-65 years old   Colonoscopy: every 10 years (may be performed more frequently if at higher risk)  OR  FOBT/FIT: every 1 year  OR  Cologuard: every 3 years  OR  Sigmoidoscopy: every 5 years  Screening may be recommended earlier than age 48 if at higher risk for colorectal cancer  Also, an individualized decision between you and your healthcare provider will decide whether screening between the ages of 74-80 would be appropriate  Colonoscopy: Not on file  FOBT/FIT: Not on file  Cologuard: Not on file  Sigmoidoscopy: Not on file          Breast Cancer Screening Age: 36 years old  Frequency: every 1-2 years  Not required if history of left and right mastectomy Mammogram: Not on file        Cervical Cancer Screening Between the ages of 21-29, pap smear recommended once every 3 years  Between the ages of 33-67, can perform pap smear with HPV co-testing every 5 years     Recommendations may differ for women with a history of total hysterectomy, cervical cancer, or abnormal pap smears in past  Pap Smear: Not on file    Screening Not Indicated   Hepatitis C Screening Once for adults born between [facility]  More frequently in patients at high risk for Hepatitis C Hep C Antibody: Not on file        Diabetes Screening 1-2 times per year if you're at risk for diabetes or have pre-diabetes Fasting glucose: 160 mg/dL   A1C: 7 4 %    Screening Not Indicated  History Diabetes   Cholesterol Screening Once every 5 years if you don't have a lipid disorder  May order more often based on risk factors  Lipid panel: 08/12/2020    Screening Not Indicated  History Lipid Disorder     Other Preventive Screenings Covered by Medicare:  1  Abdominal Aortic Aneurysm (AAA) Screening: covered once if your at risk  You're considered to be at risk if you have a family history of AAA  2  Lung Cancer Screening: covers low dose CT scan once per year if you meet all of the following conditions: (1) Age 50-69; (2) No signs or symptoms of lung cancer; (3) Current smoker or have quit smoking within the last 15 years; (4) You have a tobacco smoking history of at least 30 pack years (packs per day multiplied by number of years you smoked); (5) You get a written order from a healthcare provider  3  Glaucoma Screening: covered annually if you're considered high risk: (1) You have diabetes OR (2) Family history of glaucoma OR (3)  aged 48 and older OR (3)  American aged 72 and older  3  Osteoporosis Screening: covered every 2 years if you meet one of the following conditions: (1) You're estrogen deficient and at risk for osteoporosis based off medical history and other findings; (2) Have a vertebral abnormality; (3) On glucocorticoid therapy for more than 3 months; (4) Have primary hyperparathyroidism; (5) On osteoporosis medications and need to assess response to drug therapy  · Last bone density test (DXA Scan): Not on file  5  HIV Screening: covered annually if you're between the age of 12-76  Also covered annually if you are younger than 13 and older than 72 with risk factors for HIV infection   For pregnant patients, it is covered up to 3 times per pregnancy  Immunizations:  Immunization Recommendations   Influenza Vaccine Annual influenza vaccination during flu season is recommended for all persons aged >= 6 months who do not have contraindications   Pneumococcal Vaccine (Prevnar and Pneumovax)  * Prevnar = PCV13  * Pneumovax = PPSV23   Adults 25-60 years old: 1-3 doses may be recommended based on certain risk factors  Adults 72 years old: Prevnar (PCV13) vaccine recommended followed by Pneumovax (PPSV23) vaccine  If already received PPSV23 since turning 65, then PCV13 recommended at least one year after PPSV23 dose  Hepatitis B Vaccine 3 dose series if at intermediate or high risk (ex: diabetes, end stage renal disease, liver disease)   Tetanus (Td) Vaccine - COST NOT COVERED BY MEDICARE PART B Following completion of primary series, a booster dose should be given every 10 years to maintain immunity against tetanus  Td may also be given as tetanus wound prophylaxis  Tdap Vaccine - COST NOT COVERED BY MEDICARE PART B Recommended at least once for all adults  For pregnant patients, recommended with each pregnancy  Shingles Vaccine (Shingrix) - COST NOT COVERED BY MEDICARE PART B  2 shot series recommended in those aged 48 and above     Health Maintenance Due:      Topic Date Due    Hepatitis C Screening  Never done    Colorectal Cancer Screening  Never done     Immunizations Due:      Topic Date Due    COVID-19 Vaccine (1) Never done    DTaP,Tdap,and Td Vaccines (1 - Tdap) 10/17/1966     Advance Directives   What are advance directives? Advance directives are legal documents that state your wishes and plans for medical care  These plans are made ahead of time in case you lose your ability to make decisions for yourself  Advance directives can apply to any medical decision, such as the treatments you want, and if you want to donate organs  What are the types of advance directives?   There are many types of advance directives, and each state has rules about how to use them  You may choose a combination of any of the following:  · Living will: This is a written record of the treatment you want  You can also choose which treatments you do not want, which to limit, and which to stop at a certain time  This includes surgery, medicine, IV fluid, and tube feedings  · Durable power of  for healthcare [facility]): This is a written record that states who you want to make healthcare choices for you when you are unable to make them for yourself  This person, called a proxy, is usually a family member or a friend  You may choose more than 1 proxy  · Do not resuscitate (DNR) order:  A DNR order is used in case your heart stops beating or you stop breathing  It is a request not to have certain forms of treatment, such as CPR  A DNR order may be included in other types of advance directives  · Medical directive: This covers the care that you want if you are in a coma, near death, or unable to make decisions for yourself  You can list the treatments you want for each condition  Treatment may include pain medicine, surgery, blood transfusions, dialysis, IV or tube feedings, and a ventilator (breathing machine)  · Values history: This document has questions about your views, beliefs, and how you feel and think about life  This information can help others choose the care that you would choose  Why are advance directives important? An advance directive helps you control your care  Although spoken wishes may be used, it is better to have your wishes written down  Spoken wishes can be misunderstood, or not followed  Treatments may be given even if you do not want them  An advance directive may make it easier for your family to make difficult choices about your care  Urinary Incontinence   Urinary incontinence (UI)  is when you lose control of your bladder  UI develops because your bladder cannot store or empty urine properly   The 3 most common types of UI are stress incontinence, urge incontinence, or both  Medicines:   · May be given to help strengthen your bladder control  Report any side effects of medication to your healthcare provider  Do pelvic muscle exercises often:  Your pelvic muscles help you stop urinating  Squeeze these muscles tight for 5 seconds, then relax for 5 seconds  Gradually work up to squeezing for 10 seconds  Do 3 sets of 15 repetitions a day, or as directed  This will help strengthen your pelvic muscles and improve bladder control  Train your bladder:  Go to the bathroom at set times, such as every 2 hours, even if you do not feel the urge to go  You can also try to hold your urine when you feel the urge to go  For example, hold your urine for 5 minutes when you feel the urge to go  As that becomes easier, hold your urine for 10 minutes  Self-care:   · Keep a UI record  Write down how often you leak urine and how much you leak  Make a note of what you were doing when you leaked urine  · Drink liquids as directed  You may need to limit the amount of liquid you drink to help control your urine leakage  Do not drink any liquid right before you go to bed  Limit or do not have drinks that contain caffeine or alcohol  · Prevent constipation  Eat a variety of high-fiber foods  Good examples are high-fiber cereals, beans, vegetables, and whole-grain breads  Walking is the best way to trigger your intestines to have a bowel movement  · Exercise regularly and maintain a healthy weight  Weight loss and exercise will decrease pressure on your bladder and help you control your leakage  · Use a catheter as directed  to help empty your bladder  A catheter is a tiny, plastic tube that is put into your bladder to drain your urine  · Go to behavior therapy as directed  Behavior therapy may be used to help you learn to control your urge to urinate      Weight Management   Why it is important to manage your weight:  Being overweight increases your risk of health conditions such as heart disease, high blood pressure, type 2 diabetes, and certain types of cancer  It can also increase your risk for osteoarthritis, sleep apnea, and other respiratory problems  Aim for a slow, steady weight loss  Even a small amount of weight loss can lower your risk of health problems  How to lose weight safely:  A safe and healthy way to lose weight is to eat fewer calories and get regular exercise  You can lose up about 1 pound a week by decreasing the number of calories you eat by 500 calories each day  Healthy meal plan for weight management:  A healthy meal plan includes a variety of foods, contains fewer calories, and helps you stay healthy  A healthy meal plan includes the following:  · Eat whole-grain foods more often  A healthy meal plan should contain fiber  Fiber is the part of grains, fruits, and vegetables that is not broken down by your body  Whole-grain foods are healthy and provide extra fiber in your diet  Some examples of whole-grain foods are whole-wheat breads and pastas, oatmeal, brown rice, and bulgur  · Eat a variety of vegetables every day  Include dark, leafy greens such as spinach, kale, terese greens, and mustard greens  Eat yellow and orange vegetables such as carrots, sweet potatoes, and winter squash  · Eat a variety of fruits every day  Choose fresh or canned fruit (canned in its own juice or light syrup) instead of juice  Fruit juice has very little or no fiber  · Eat low-fat dairy foods  Drink fat-free (skim) milk or 1% milk  Eat fat-free yogurt and low-fat cottage cheese  Try low-fat cheeses such as mozzarella and other reduced-fat cheeses  · Choose meat and other protein foods that are low in fat  Choose beans or other legumes such as split peas or lentils  Choose fish, skinless poultry (chicken or turkey), or lean cuts of red meat (beef or pork)  Before you cook meat or poultry, cut off any visible fat  · Use less fat and oil  Try baking foods instead of frying them  Add less fat, such as margarine, sour cream, regular salad dressing and mayonnaise to foods  Eat fewer high-fat foods  Some examples of high-fat foods include french fries, doughnuts, ice cream, and cakes  · Eat fewer sweets  Limit foods and drinks that are high in sugar  This includes candy, cookies, regular soda, and sweetened drinks  Exercise:  Exercise at least 30 minutes per day on most days of the week  Some examples of exercise include walking, biking, dancing, and swimming  You can also fit in more physical activity by taking the stairs instead of the elevator or parking farther away from stores  Ask your healthcare provider about the best exercise plan for you  © Copyright [address] 2018 Information is for End User's use only and may not be sold, redistributed or otherwise used for commercial purposes  All illustrations and images included in CareNotes® are the copyrighted property of [employer]  or [address]:   A Mediterranean diet  is a meal plan that includes foods that are commonly eaten in countries that border the [facility]  This meal plan may provide several health benefits  These include losing or maintaining weight, and decreasing blood pressure, blood sugar, and cholesterol levels  It may also help protect against certain health conditions such as heart disease, cancer, type 2 diabetes, and Alzheimer disease  Work with a dietitian to develop a meal plan that is right for you  Foods to include in the [address] [facility] diet:   Include fruits and vegetables in each meal   Eat a variety of fresh fruits and vegetables  Choose whole grains every day  These foods include whole-grain breads, pastas, and cereals  It also includes brown rice, quinoa, and millet  Use unsaturated fats instead of saturated fats  Cook with olive or canola oil   Limit saturated fats, such as butter, margarine, and shortening  Saturated fat is an unhealthy fat that can increase your cholesterol levels  Choose plant foods, poultry, and fish as your main sources of protein  Eat plant-based foods that provide protein,  such as lentils, beans, chickpeas, nuts, and seeds  Choose mostly plant-based foods in place of meat on most days of the week  Eat protein foods high in omega-3 fats  Fish high in omega-3 fats include salmon, trout, and tuna  Include these types of fish 1 or 2 times each week  Limit fish high in mercury, such as shark, swordfish, tilefish, and rebecca mackerel  Omega-3 fats are also found in walnuts and flaxseed  Choose poultry (chicken or turkey)  without skin instead of red meat  Red meat is high in saturated fat  Limit eggs and high-fat meats, such as quiroz, sausage, and hot dogs  Choose low-fat dairy foods  such as nonfat or 1% milk, or low-fat almond, cashew, or soy milk  Other examples include low-fat cheese, yogurt, and cottage cheese  Limit sweets  Limit your intake of high-sugar foods, such as soda, desserts, and candy  Talk to your healthcare provider about alcohol  Studies have shown that moderate intake of wine may reduce the risk of heart disease  A moderate amount of wine is 1 serving for women and men 65 years and older each day  Two servings is recommended for men 24to 59years of age each day  A serving of wine is 5 ounces  Other things you need to know if you follow the Mediterranean diet:   Include foods high in iron and vitamin C   Plant-based foods that are high in iron include spinach, beans, tofu, and artichoke  Eat a serving of vitamin C with any iron-rich food to help your body absorb more iron  Examples include oranges, strawberries, cantaloupe, broccoli, and yellow peppers  Get regular physical activity  The Mediterranean diet will have the most benefit if you get regular physical activity   Get 30 minutes of physical activity at least 5 days a week  Choose physical activities that increase your heart rate  Examples include walking, hiking, swimming, and riding a bike  Ask your healthcare provider about the best exercise plan for you  © Copyright [address] Information is for End User's use only and may not be sold, redistributed or otherwise used for commercial purposes  All illustrations and images included in CareNotes® are the copyrighted property of [employer]  or [address] [place], [place] [address]   The above information is an  only  It is not intended as medical advice for individual conditions or treatments  Talk to your doctor, nurse or pharmacist before following any medical regimen to see if it is safe and effective for you

## 2021-05-19 ENCOUNTER — OFFICE VISIT (OUTPATIENT)
Dept: PSYCHIATRY | Facility: CLINIC | Age: 76
End: 2021-05-19
Payer: COMMERCIAL

## 2021-05-19 ENCOUNTER — TELEPHONE (OUTPATIENT)
Dept: PSYCHIATRY | Facility: CLINIC | Age: 76
End: 2021-05-19

## 2021-05-19 DIAGNOSIS — F42.9 OBSESSIVE-COMPULSIVE DISORDER, UNSPECIFIED TYPE: ICD-10-CM

## 2021-05-19 DIAGNOSIS — F31.81 BIPOLAR II DISORDER (HCC): ICD-10-CM

## 2021-05-19 DIAGNOSIS — F33.41 RECURRENT MAJOR DEPRESSIVE DISORDER, IN PARTIAL REMISSION (HCC): ICD-10-CM

## 2021-05-19 DIAGNOSIS — F32.3 MAJOR DEPRESSION WITH PSYCHOTIC FEATURES (HCC): Primary | ICD-10-CM

## 2021-05-19 PROCEDURE — 99214 OFFICE O/P EST MOD 30 MIN: CPT | Performed by: PSYCHIATRY & NEUROLOGY

## 2021-05-19 PROCEDURE — 1036F TOBACCO NON-USER: CPT | Performed by: PSYCHIATRY & NEUROLOGY

## 2021-05-19 PROCEDURE — 90833 PSYTX W PT W E/M 30 MIN: CPT | Performed by: PSYCHIATRY & NEUROLOGY

## 2021-05-19 PROCEDURE — 1160F RVW MEDS BY RX/DR IN RCRD: CPT | Performed by: PSYCHIATRY & NEUROLOGY

## 2021-05-19 NOTE — BH TREATMENT PLAN
TREATMENT PLAN (Medication Management Only)        [facility]    Name and Date of Birth:  Fabiola Arellano 76 y o  1945  Date of Treatment Plan: May 19, 2021  Diagnosis/Diagnoses:    1  Major depression with psychotic features ([facility] )    2  Obsessive-compulsive disorder, unspecified type    3  Recurrent major depressive disorder, in partial remission ([facility] )    4  Bipolar II disorder [facility]      Strengths/Personal Resources for Self-Care: "I keep on going no matter what  I can process things  I am organized  I am very aware of what is going on with me ", supportive family  Area/Areas of need (in own words): "I want to work on getting down to the small house in [place] "  1  Long Term Goal: get to [place] and settle out all the outstanding accounts     Target Date:6 months - 11/19/2021  Person/Persons responsible for completion of goal: Dr Reji Dsouza  2  Short Term Objective (s) - How will we reach this goal?:   A  Provider new recommended medication/dosage changes and/or continue medication(s): no medications at this time     B  N/A   C  N/A  Target Date:6 months - 11/19/2021  Person/Persons Responsible for Completion of Goal: Dr Reji Dsouza  Progress Towards Goals: stable  Treatment Modality: medication management every 9 months  Review due 180 days from date of this plan: 9 months  Expected length of service: maintenance  My Physician/PA/NP and I have developed this plan together and I agree to work on the goals and objectives  I understand the treatment goals that were developed for my treatment    Treatment Plan done but not signed at time of office visit due to:  Plan reviewed by phone or in person  and verbal consent given due to [employer] social emilia

## 2021-05-19 NOTE — PATIENT INSTRUCTIONS
Major depression with psychotic features (HCC)    Obsessive-compulsive disorder, unspecified type    Recurrent major depressive disorder, in partial remission ([facility] )    [facility]      off of all meds    Follow up in 9 months

## 2021-05-19 NOTE — PSYCH
Subjective:     Patient ID: Reymundo Lawrence is a 76 y o  female with MDD, OCD, and grief being seen for a follow up  She is currently on Zoloft and s/p Zyprexa ( due to metabolic derangement)  HPI ROS Appetite Changes and Sleep: the patient stated that she has been doing pretty well, considering that she has had to have more lesions removed from her head  She is off of all her medications  She stopped both Zyprexa and Zoloft as she did not want to be on medication and this seemed to resolve her sedation during the day and no more having issues with waking up in a V shape  She has been doing well mood wise and denied depression  She has been very busy cleaning out her late husbands paperwork since 1989  She has been in touch with her sons  She is organizing her files at home and she enjoys that  She has gone to a new family doctor and will be getting blood work  She continues to have tinnitus  She is also going to get a revision on her ear lesion in June  She is sleeping like she normally does  If she is very worried about something she will not sleep well, but then will sleep well the night after  She has been drinking chamomile tea at night and this has been helping her sleep better when anxious  She discussed a history of BPAD type 2 and hypomania  She has been on Lithium in the past and this was helpful for her in the past at small doses  Anxiety is "appropriate for the situation" due to an account that she cannot close as it is a trust on her husbands side of the family  No SI/HI  Appetite has been is good and has come back  She has regained about 25 lbs since she was very sick  Review Of Systems:     Mood Anxiety and Depression improved and stable   Behavior Compulsive Behavior improved to 80-90%      Thought Content Normal   General Emotional Problems and Sleep Disturbances   Personality Normal   Other Psych Symptoms Normal   Constitutional As Noted in HPI   ENT As Noted in HPI and allergies Cardiovascular Negative   Respiratory Negative   Gastrointestinal Negative   Genitourinary Negative   Musculoskeletal Negative   Integumentary As Noted in HPI and Skin Lesions   Neurological Negative   Endocrine Normal    Other Symptoms Normal              Laboratory Results:no new labs to review  Substance Abuse History:  Social History     Substance and Sexual Activity   Drug Use No       Family Psychiatric History:   Family History   Adopted: Yes   Problem Relation Age of Onset    ALS Mother     Other Mother         Gastrointestinal bleeding    Other Father         Gastrointestinal bleeding    Alcohol abuse Father     Colon cancer Maternal Aunt     Psychosis Cousin        The following portions of the patient's history were reviewed and updated as appropriate: allergies, current medications, past family history, past medical history, past social history, past surgical history and problem list     Social History     Socioeconomic History    Marital status:      Spouse name: Not on file    Number of children: 3    Years of education: 12    Highest education level:  Bachelor's degree (e g , BA, AB, BS)   Occupational History    Occupation: retired   Social Needs    Financial resource strain: Not on file    Food insecurity     Worry: Not on file     Inability: Not on file   [employer] needs     Medical: Not on file     Non-medical: Not on file   Tobacco Use    Smoking status: Former Smoker     Packs/day: 0 25     Years: 2 00     Pack years: 0 50     Types: Cigarettes    Smokeless tobacco: Never Used    Tobacco comment: quit in 1968   Substance and Sexual Activity    Alcohol use: Not Currently    Drug use: No    Sexual activity: Not Currently   Lifestyle    Physical activity     Days per week: Not on file     Minutes per session: Not on file    Stress: Not on file   Relationships    Social connections     Talks on phone: Not on file     Gets together: Not on file     Attends Gnosticism service: Not on file     Active member of club or organization: Not on file     Attends meetings of clubs or organizations: Not on file     Relationship status: Not on file    Intimate partner violence     Fear of current or ex partner: Not on file     Emotionally abused: Not on file     Physically abused: Not on file     Forced sexual activity: Not on file   Other Topics Concern    Not on file   Social History Narrative    Not on file     Social History     Social History Narrative    Not on file       Objective:       Mental status:  Appearance calm and cooperative , adequate hygiene and grooming and good eye contact    Mood euthymic   Affect affect was broad   Speech a normal rate  Loquacious, not pressured   Thought Processes circumstantial   Hallucinations no hallucinations present    Thought Content no delusions   Abnormal Thoughts no suicidal thoughts  and no homicidal thoughts    Orientation  oriented to person and place and time   Remote Memory short term memory intact and long term memory intact   Attention Span concentration intact   Intellect Appears to be of Average Intelligence   Insight Insight intact   Judgement judgment was intact   Muscle Strength Muscle strength and tone were normal and Normal gait    Language no difficulty naming common objects and no difficulty repeating a phrase    Fund of Knowledge displays adequate knowledge of current events and adequate fund of knowledge regarding past history   Pain none   Pain Scale 0       Assessment/Plan:       Diagnoses and all orders for this visit:    Major depression with psychotic features ([facility] )    Obsessive-compulsive disorder, unspecified type    Recurrent major depressive disorder, in partial remission ([facility] )    Bipolar II disorder ([facility] )      off of all meds    Follow up in 9 months    Treatment Recommendations- Risks Benefits      Immediate Medical/Psychiatric/Psychotherapy Treatments and Any Precautions: she has stopped her medications and doing well  Her OCD is 80-90% controlled and this is not impeding her daily activity  Mood stable  BPAD type 2 is likely and has a hx of it  She has had lithium in the past and this was helpful for depression  If needed, will give small dose of this  Risks, Benefits And Possible Side Effects Of Medications:  PSYCH RISK, BENEFITS AND POSSIBLE SIDE EFFECTS discussed  Controlled Medication Discussion: No records found for controlled prescriptions according to [address] [place] [address] The [email or web address] Monitoring Program       Psychotherapy Provided: Individual psychotherapy provided       Goals discussed in session: medications, dx, keeping busy, coping, dreams  Counseling provided: 20       This note was not shared with the patient due to reasonable likelihood of causing patient harm

## 2021-05-19 NOTE — TELEPHONE ENCOUNTER
5/19/2021 @ 1 PM spoke with Virginia Tolbert and scheduled her 6 week f/u per Dr Jody Zamora in [facility] office

## 2021-06-10 ENCOUNTER — IMMUNIZATIONS (OUTPATIENT)
Dept: FAMILY MEDICINE CLINIC | Facility: HOSPITAL | Age: 76
End: 2021-06-10

## 2021-06-10 DIAGNOSIS — Z23 ENCOUNTER FOR IMMUNIZATION: Primary | ICD-10-CM

## 2021-06-10 PROCEDURE — 0001A SARS-COV-2 / COVID-19 MRNA VACCINE (PFIZER-BIONTECH) 30 MCG: CPT

## 2021-06-10 PROCEDURE — 91300 SARS-COV-2 / COVID-19 MRNA VACCINE (PFIZER-BIONTECH) 30 MCG: CPT

## 2021-06-30 ENCOUNTER — IMMUNIZATIONS (OUTPATIENT)
Dept: FAMILY MEDICINE CLINIC | Facility: HOSPITAL | Age: 76
End: 2021-06-30

## 2021-06-30 DIAGNOSIS — Z23 ENCOUNTER FOR IMMUNIZATION: Primary | ICD-10-CM

## 2021-06-30 PROCEDURE — 0002A SARS-COV-2 / COVID-19 MRNA VACCINE (PFIZER-BIONTECH) 30 MCG: CPT

## 2021-06-30 PROCEDURE — 91300 SARS-COV-2 / COVID-19 MRNA VACCINE (PFIZER-BIONTECH) 30 MCG: CPT

## 2021-07-15 ENCOUNTER — APPOINTMENT (OUTPATIENT)
Dept: LAB | Facility: CLINIC | Age: 76
End: 2021-07-15
Payer: COMMERCIAL

## 2021-07-15 DIAGNOSIS — E11.9 TYPE 2 DIABETES MELLITUS WITHOUT COMPLICATION, WITHOUT LONG-TERM CURRENT USE OF INSULIN (HCC): ICD-10-CM

## 2021-07-15 DIAGNOSIS — E03.8 SUBCLINICAL HYPOTHYROIDISM: ICD-10-CM

## 2021-07-15 DIAGNOSIS — E78.00 PURE HYPERCHOLESTEROLEMIA: ICD-10-CM

## 2021-07-15 DIAGNOSIS — Z11.59 NEED FOR HEPATITIS C SCREENING TEST: ICD-10-CM

## 2021-07-15 LAB
ALBUMIN SERPL BCP-MCNC: 4 G/DL (ref 3.5–5)
ALP SERPL-CCNC: 81 U/L (ref 46–116)
ALT SERPL W P-5'-P-CCNC: 23 U/L (ref 12–78)
ANION GAP SERPL CALCULATED.3IONS-SCNC: 8 MMOL/L (ref 4–13)
AST SERPL W P-5'-P-CCNC: 16 U/L (ref 5–45)
BILIRUB SERPL-MCNC: 0.71 MG/DL (ref 0.2–1)
BUN SERPL-MCNC: 10 MG/DL (ref 5–25)
CALCIUM SERPL-MCNC: 9.6 MG/DL (ref 8.3–10.1)
CHLORIDE SERPL-SCNC: 104 MMOL/L (ref 100–108)
CHOLEST SERPL-MCNC: 253 MG/DL (ref 50–200)
CO2 SERPL-SCNC: 23 MMOL/L (ref 21–32)
CREAT SERPL-MCNC: 0.71 MG/DL (ref 0.6–1.3)
CREAT UR-MCNC: <13 MG/DL
EST. AVERAGE GLUCOSE BLD GHB EST-MCNC: 148 MG/DL
GFR SERPL CREATININE-BSD FRML MDRD: 84 ML/MIN/1.73SQ M
GLUCOSE P FAST SERPL-MCNC: 129 MG/DL (ref 65–99)
HBA1C MFR BLD: 6.8 %
HCV AB SER QL: NORMAL
HDLC SERPL-MCNC: 59 MG/DL
LDLC SERPL CALC-MCNC: 163 MG/DL (ref 0–100)
MICROALBUMIN UR-MCNC: <5 MG/L (ref 0–20)
POTASSIUM SERPL-SCNC: 4.4 MMOL/L (ref 3.5–5.3)
PROT SERPL-MCNC: 7.9 G/DL (ref 6.4–8.2)
SODIUM SERPL-SCNC: 135 MMOL/L (ref 136–145)
T4 FREE SERPL-MCNC: 0.82 NG/DL (ref 0.76–1.46)
TRIGL SERPL-MCNC: 156 MG/DL
TSH SERPL DL<=0.05 MIU/L-ACNC: 5.18 UIU/ML (ref 0.36–3.74)

## 2021-07-15 PROCEDURE — 84439 ASSAY OF FREE THYROXINE: CPT

## 2021-07-15 PROCEDURE — 80053 COMPREHEN METABOLIC PANEL: CPT

## 2021-07-15 PROCEDURE — 84443 ASSAY THYROID STIM HORMONE: CPT

## 2021-07-15 PROCEDURE — 86803 HEPATITIS C AB TEST: CPT

## 2021-07-15 PROCEDURE — 3044F HG A1C LEVEL LT 7.0%: CPT | Performed by: PSYCHIATRY & NEUROLOGY

## 2021-07-15 PROCEDURE — 36415 COLL VENOUS BLD VENIPUNCTURE: CPT

## 2021-07-15 PROCEDURE — 80061 LIPID PANEL: CPT

## 2021-07-15 PROCEDURE — 82043 UR ALBUMIN QUANTITATIVE: CPT

## 2021-07-15 PROCEDURE — 82570 ASSAY OF URINE CREATININE: CPT

## 2021-07-15 PROCEDURE — 83036 HEMOGLOBIN GLYCOSYLATED A1C: CPT

## 2021-07-16 ENCOUNTER — RA CDI HCC (OUTPATIENT)
Dept: OTHER | Facility: HOSPITAL | Age: 76
End: 2021-07-16

## 2021-07-16 NOTE — PROGRESS NOTES
[facility]  coding opportunities          Chart reviewed, no opportunity found: CHART REVIEWED, NO OPPORTUNITY FOUND                     Patients insurance company: [employer] only)

## 2021-07-25 PROBLEM — E55.9 VITAMIN D DEFICIENCY: Status: RESOLVED | Noted: 2018-07-10 | Resolved: 2021-07-25

## 2021-07-25 NOTE — PROGRESS NOTES
FAMILY MEDICINE PROGRESS NOTE    Date of Service: 21  Primary Care Provider:   Joana Boeck, MD       Name: Regan Elmore       : 1945       Age:75 y o  Sex: female      MRN: [id number]      Chief Complaint:Follow-up (2 months)       ASSESSMENT and PLAN:  Regan Elmore is a 76 y o  female with:     Problem List Items Addressed This Visit        Endocrine    Type 2 diabetes mellitus without complication, without long-term current use of insulin ([address] ) - Primary     Lab Results   Component Value Date    HGBA1C 6 8 (H) 07/15/2021    HGBA1C 7 4 (H) 2020    HGBA1C 5 9 2019     Lab Results   Component Value Date    GLUF 129 (H) 07/15/2021    LDLCALC 163 (H) 07/15/2021    CREATININE 0 71 07/15/2021     A1C improved, continue with lifestyle modifications  Counseled patient on the importance of lifestyle interventions, specifically discussed a whole food, plant based diet low in saturated fat and processed foods  Discussed the importance of a diet that is rich in whole grains, fruits and vegetables, beans and legumes            Subclinical hypothyroidism     Lab Results   Component Value Date    [id number] 5 180 (H) 07/15/2021     Normal FT4-0 82  Continue to monitor            Musculoskeletal and Integument    Basal cell carcinoma (BCC) of scalp     Being followed by Dr Terri Brittle, Dr Robin Arauz and Dr Carmen Hobbs and has multiple lesions that have been grafted  She will be having Mohs with Dr Devon Pisano in August              Other    Pure hypercholesterolemia     Lab Results   Component Value Date    HDL 59 07/15/2021    [address] 163 (H) 07/15/2021    TRIG 156 (H) 07/15/2021     The 10-year ASCVD risk score (Crystal Verde et al , 2013) is: 38 5%    Values used to calculate the score:      Age: 76 years      Sex: Female      Is Non- : No      Diabetic: Yes      Tobacco smoker: No      Systolic Blood Pressure: 325 mmHg      Is BP treated: No      HDL Cholesterol: 59 mg/dL      Total Cholesterol: 253 mg/dL     Patient has previously declined statin due to concern for developing autoimmune condition (Jacqulyne Deep disease)  Reviewed elevated ASCVD risk, continue off statin for now per patient preference  Counseled on lifestyle modifications              Mixed obsessional thoughts and acts    Recurrent major depressive disorder, in partial remission ([facility] )     She reports that she is doing well  She is not currently on medications, she continues to follow with psychiatry  She feels like she would know whether or not she would need to be back on medications  Patient declined colon cancer and breast cancer screening  She did have sigmoidoscopy and mammogram over 30 years ago  SUBJECTIVE:  Marcelle Adrian is a 76 y o  female who presents today with a chief complaint of Follow-up (2 months)  HPI       Diabetes  She is not currently on medications, most recent A1C at prior visit in May 2021 was done in August 2020 of 7 4  She has tried to drink more water and walk regularly  She has been on medications previously, but is not currently on medications  She does see eye doctor, Dr James Kim  Depression/OCD  She follows with psychiatry, Dr Mp Lombardi    She did have hospitalization in 2019 for major depression, at that time she was struggling with anticipatory grief with her  on hospice for Creutzfeld-Bimal disease  She was diagnosed with having an episode of psychosis with hallucinations  She was started on Zoloft and Zyprexa at this time  She is no longer on medications, she was on lithium year ago  She still follows with psychiatry  She reports that she has been waking up twiddling her thumbs  She is wondering if this is a nervous tick  She does report that she is under a lot of stress; she is trying to help her son financially  She has her normal depressive symptoms  Basal Cell Carcinoma  She has had multiple basal cell carcinomas removed from scalp   She last saw Dr Yumiko Jose in January  She will undergoing more Mohs surgeries in August  She has been following with Surg-Onc as well  Review of Systems   HENT: Negative for tinnitus  Musculoskeletal: Positive for arthralgias  Neurological: Positive for numbness  Psychiatric/Behavioral: Positive for dysphoric mood and sleep disturbance  The patient is nervous/anxious  I have reviewed the patient's Past Medical History  Current Outpatient Medications:     acetaminophen (TYLENOL) 500 mg tablet, Take 500 mg by mouth every 8 (eight) hours as needed for mild pain, Disp: , Rfl:     Multiple Vitamins-Minerals (MULTIVITAMIN ADULTS 50+ PO), Take by mouth daily, Disp: , Rfl:     OBJECTIVE:  /86   Pulse 78   Temp 97 9 °F (36 6 °C)   Resp 14   Ht 5' 5 25" (1 657 m)   Wt 69 4 kg (153 lb)   SpO2 98%   BMI 25 27 kg/m²    BP Readings from Last 3 Encounters:   07/26/21 142/86   05/18/21 152/84   09/25/20 124/80      Wt Readings from Last 3 Encounters:   07/26/21 69 4 kg (153 lb)   05/18/21 70 3 kg (155 lb)   09/28/20 71 7 kg (158 lb)      Physical Exam  Constitutional:       General: She is not in acute distress  Appearance: Normal appearance  She is normal weight  She is not ill-appearing or toxic-appearing  HENT:      Head: Normocephalic and atraumatic  Right Ear: External ear normal       Left Ear: External ear normal    Eyes:      Extraocular Movements: Extraocular movements intact  Conjunctiva/sclera: Conjunctivae normal    Cardiovascular:      Rate and Rhythm: Normal rate and regular rhythm  Pulses: Normal pulses  no weak pulses          Dorsalis pedis pulses are 2+ on the right side and 2+ on the left side  Posterior tibial pulses are 2+ on the right side and 2+ on the left side  Heart sounds: No murmur heard  No friction rub  No gallop  Pulmonary:      Effort: Pulmonary effort is normal  No respiratory distress  Breath sounds: Normal breath sounds  Musculoskeletal:         General: Normal range of motion  Cervical back: Normal range of motion and neck supple  Right lower leg: No edema  Left lower leg: No edema  Feet:      Right foot:      Skin integrity: No ulcer, skin breakdown, erythema, warmth, callus or dry skin  Left foot:      Skin integrity: No ulcer, skin breakdown, erythema, warmth, callus or dry skin  Skin:     General: Skin is warm and dry  Findings: No erythema or rash  Neurological:      General: No focal deficit present  Mental Status: She is alert and oriented to person, place, and time  Psychiatric:         Mood and Affect: Mood and affect normal          Speech: Speech is tangential          Behavior: Behavior normal        Patient's shoes and socks removed  Right Foot/Ankle   Right Foot Inspection  Skin Exam: skin normal and skin intact no dry skin, no warmth, no callus, no erythema, no maceration, no abnormal color, no pre-ulcer, no ulcer and no callus                          Toe Exam: ROM and strength within normal limits  Sensory     Proprioception: intact   Monofilament testing: intact  Vascular  Capillary refills: < 3 seconds  The right DP pulse is 2+  The right PT pulse is 2+  Left Foot/Ankle  Left Foot Inspection  Skin Exam: skin normal and skin intactno dry skin, no warmth, no erythema, no maceration, normal color, no pre-ulcer, no ulcer and no callus                         Toe Exam: ROM and strength within normal limits                   Sensory     Proprioception: intact  Monofilament: intact  Vascular  Capillary refills: < 3 seconds  The left DP pulse is 2+  The left PT pulse is 2+  Assign Risk Category:  No deformity present; No loss of protective sensation;  No weak pulses       Risk: 0    Lab Results   Component Value Date    HGBA1C 6 8 (H) 07/15/2021     Lab Results   Component Value Date    SODIUM 135 (L) 07/15/2021    K 4 4 07/15/2021     07/15/2021    CO2 23 07/15/2021 BUN 10 07/15/2021    CREATININE 0 71 07/15/2021    GLUC 104 10/18/2016    CALCIUM 9 6 07/15/2021     Lab Results   Component Value Date    ALT 23 07/15/2021    AST 16 07/15/2021    ALKPHOS 81 07/15/2021     Lab Results   Component Value Date    [id number] 5 180 (H) 07/15/2021     Lab Results   Component Value Date    CHOLESTEROL 253 (H) 07/15/2021    CHOLESTEROL 323 (H) 08/12/2020    CHOLESTEROL 242 (H) 12/06/2019     Lab Results   Component Value Date    HDL 59 07/15/2021    HDL 55 08/12/2020    HDL 72 12/06/2019     Lab Results   Component Value Date    TRIG 156 (H) 07/15/2021    TRIG 168 (H) 08/12/2020    TRIG 137 12/06/2019     Lab Results   Component Value Date    NONHDLC 183 06/29/2018     Lab Results   Component Value Date    LDLCALC 163 (H) 07/15/2021     Vit D, 25-Hydroxy   Date Value Ref Range Status   12/06/2019 27 3 (L) 30 0 - 100 0 ng/mL Final              Return in about 6 months (around 1/26/2022) for follow-up diabetes, recheck A1C in office  Cherrie Blas MD    Note: Portions of the record have been created with voice recognition software  Occasional wrong word or "sound a like" substitutions may have occurred due to the inherent limitations of voice recognition software  Read the chart carefully and recognize, using context, where substitutions have occurred

## 2021-07-25 NOTE — ASSESSMENT & PLAN NOTE
Lab Results   Component Value Date    HGBA1C 6 8 (H) 07/15/2021    HGBA1C 7 4 (H) 08/12/2020    HGBA1C 5 9 05/08/2019     Lab Results   Component Value Date    GLUF 129 (H) 07/15/2021    LDLCALC 163 (H) 07/15/2021    CREATININE 0 71 07/15/2021     A1C improved, continue with lifestyle modifications  Counseled patient on the importance of lifestyle interventions, specifically discussed a whole food, plant based diet low in saturated fat and processed foods  Discussed the importance of a diet that is rich in whole grains, fruits and vegetables, beans and legumes

## 2021-07-25 NOTE — ASSESSMENT & PLAN NOTE
Lab Results   Component Value Date    HDL 59 07/15/2021    LDLCALC 163 (H) 07/15/2021    TRIG 156 (H) 07/15/2021     The 10-year ASCVD risk score (William Jim et al , 2013) is: 38 5%    Values used to calculate the score:      Age: 76 years      Sex: Female      Is Non- : No      Diabetic: Yes      Tobacco smoker: No      Systolic Blood Pressure: 222 mmHg      Is BP treated: No      HDL Cholesterol: 59 mg/dL      Total Cholesterol: 253 mg/dL     Patient has previously declined statin due to concern for developing autoimmune condition Robi Desai Gehrig's disease)  Reviewed elevated ASCVD risk, continue off statin for now per patient preference     Counseled on lifestyle modifications

## 2021-07-25 NOTE — ASSESSMENT & PLAN NOTE
Lab Results   Component Value Date    [id number] 5 180 (H) 07/15/2021     Normal FT4-0 82  Continue to monitor

## 2021-07-26 ENCOUNTER — OFFICE VISIT (OUTPATIENT)
Dept: FAMILY MEDICINE CLINIC | Facility: CLINIC | Age: 76
End: 2021-07-26
Payer: COMMERCIAL

## 2021-07-26 VITALS
SYSTOLIC BLOOD PRESSURE: 142 MMHG | HEIGHT: 65 IN | RESPIRATION RATE: 14 BRPM | HEART RATE: 78 BPM | WEIGHT: 153 LBS | DIASTOLIC BLOOD PRESSURE: 86 MMHG | BODY MASS INDEX: 25.49 KG/M2 | OXYGEN SATURATION: 98 % | TEMPERATURE: 97.9 F

## 2021-07-26 DIAGNOSIS — E11.9 TYPE 2 DIABETES MELLITUS WITHOUT COMPLICATION, WITHOUT LONG-TERM CURRENT USE OF INSULIN (HCC): Primary | ICD-10-CM

## 2021-07-26 DIAGNOSIS — E78.00 PURE HYPERCHOLESTEROLEMIA: ICD-10-CM

## 2021-07-26 DIAGNOSIS — F42.2 MIXED OBSESSIONAL THOUGHTS AND ACTS: ICD-10-CM

## 2021-07-26 DIAGNOSIS — E03.8 SUBCLINICAL HYPOTHYROIDISM: ICD-10-CM

## 2021-07-26 DIAGNOSIS — C44.41 BASAL CELL CARCINOMA (BCC) OF SCALP: ICD-10-CM

## 2021-07-26 DIAGNOSIS — F33.41 RECURRENT MAJOR DEPRESSIVE DISORDER, IN PARTIAL REMISSION (HCC): ICD-10-CM

## 2021-07-26 PROCEDURE — 99214 OFFICE O/P EST MOD 30 MIN: CPT | Performed by: FAMILY MEDICINE

## 2021-07-26 PROCEDURE — 1036F TOBACCO NON-USER: CPT | Performed by: FAMILY MEDICINE

## 2021-07-26 PROCEDURE — 1160F RVW MEDS BY RX/DR IN RCRD: CPT | Performed by: FAMILY MEDICINE

## 2021-07-26 NOTE — ASSESSMENT & PLAN NOTE
She reports that she is doing well  She is not currently on medications, she continues to follow with psychiatry  She feels like she would know whether or not she would need to be back on medications

## 2021-07-26 NOTE — ASSESSMENT & PLAN NOTE
Being followed by Dr Joe Onofre, Dr Vin Alba and Dr Melani Marquez and has multiple lesions that have been grafted  She will be having Mohs with Dr Nina Pemberton in August

## 2021-08-06 ENCOUNTER — HOSPITAL ENCOUNTER (EMERGENCY)
Facility: HOSPITAL | Age: 76
Discharge: HOME | End: 2021-08-06
Attending: EMERGENCY MEDICINE | Admitting: EMERGENCY MEDICINE
Payer: COMMERCIAL

## 2021-08-06 ENCOUNTER — TELEPHONE (OUTPATIENT)
Dept: FAMILY MEDICINE CLINIC | Facility: CLINIC | Age: 76
End: 2021-08-06

## 2021-08-06 VITALS
SYSTOLIC BLOOD PRESSURE: 195 MMHG | TEMPERATURE: 98.3 F | DIASTOLIC BLOOD PRESSURE: 97 MMHG | RESPIRATION RATE: 18 BRPM | HEART RATE: 87 BPM | OXYGEN SATURATION: 96 %

## 2021-08-06 DIAGNOSIS — Z20.3 NEED FOR POST EXPOSURE PROPHYLAXIS FOR RABIES: Primary | ICD-10-CM

## 2021-08-06 PROCEDURE — 90375 RABIES IG IM/SC: CPT | Performed by: PHYSICIAN ASSISTANT

## 2021-08-06 PROCEDURE — 90471 IMMUNIZATION ADMIN: CPT

## 2021-08-06 PROCEDURE — 96372 THER/PROPH/DIAG INJ SC/IM: CPT

## 2021-08-06 PROCEDURE — 99284 EMERGENCY DEPT VISIT MOD MDM: CPT | Performed by: PHYSICIAN ASSISTANT

## 2021-08-06 PROCEDURE — 99283 EMERGENCY DEPT VISIT LOW MDM: CPT

## 2021-08-06 PROCEDURE — 90675 RABIES VACCINE IM: CPT | Performed by: PHYSICIAN ASSISTANT

## 2021-08-06 RX ADMIN — RABIES VIRUS STRAIN PM-1503-3M ANTIGEN (PROPIOLACTONE INACTIVATED) AND WATER 1 ML: KIT at 18:56

## 2021-08-06 RX ADMIN — RABIES IMMUNE GLOBULIN (HUMAN) 1380 UNITS: 300 INJECTION, SOLUTION INFILTRATION; INTRAMUSCULAR at 19:20

## 2021-08-06 NOTE — ED PROVIDER NOTES
History  Chief Complaint   Patient presents with    Rabies Test     pt presents to ER for possible exposure to a bat x 1 day  Pt sent to ER by PCP for initiation of rabies vaccination series     This is a 77 y/o female who presents to ED for possible rabies exposure  She states that she woke up this morning and noted that her bedroom  She mentioned this to her dermatologist today who stated that she should be vaccinated against rabies because she could of been bit and not noted  She spoke to her PCP who recommended the same  A PA the morning came up to her house and could not find the back but also recommended that she get vaccinated  She currently offers no complaints at this time  Prior to Admission Medications   Prescriptions Last Dose Informant Patient Reported? Taking? Multiple Vitamins-Minerals (MULTIVITAMIN ADULTS 50+ PO)  Self Yes No   Sig: Take by mouth daily   acetaminophen (TYLENOL) 500 mg tablet  Self Yes No   Sig: Take 500 mg by mouth every 8 (eight) hours as needed for mild pain      Facility-Administered Medications: None       Past Medical History:   Diagnosis Date    Alopecia areata     Anxiety     Asthma     Basal cell carcinoma (BCC) in situ of skin     Depression     Diabetes mellitus (HCC)     Fibromyalgia, primary     H/O migraine     improved since menopause    Hashimoto's disease     Hyperlipidemia     Incontinence of urine     PONV (postoperative nausea and vomiting)        Past Surgical History:   Procedure Laterality Date    FLAP LOCAL HEAD / NECK N/A 5/29/2019    Procedure: RECONSTRUCTION BASAL CELL CARCINOMA DEFECTS OF LEFT TEMPLE, VERTEX, ANTERIOR SCALP AND LEFT EAR;  Surgeon: Shaye Pollock MD;  [facility];  Service: Plastics    FULL THICKNESS SKIN GRAFT Left 9/15/2020    Procedure: EAR FULL THICKNESS SKIN GRAFT (FTSG);   Surgeon: Shaye Pollock MD;  [facility];  Service: Plastics    LITHOTRIPSY      Renal    MOHS RECONSTRUCTION Left 9/15/2020    Procedure: EAR MOHS DEFECT RECONSTRUCTION;  Surgeon: Cameron Mohs, MD;  [facility];  Service: Plastics    ID FULL THICK  [address] HEAD,FAC,HAND <20SQC N/A 2019    Procedure: FTSG LEFT EAR;  Surgeon: Cameron Mohs, MD;  [facility];  Service: Plastics    RECTAL VAGINAL FISTULECTOMY      SKIN BIOPSY      SKIN LESION EXCISION N/A 2019    Procedure: WIDE EXCISION BASAL CELL CARCINOMA VERTEX OF SCALP, LEFT EAR AND LEFT TEMPLE;  Surgeon: Maida Stoddard MD;  [facility];  Service: Surgical Oncology    SPLIT THICKNESS SKIN GRAFT N/A 2019    Procedure: FLAP RECONSTRUCTION OF LEFT TEMPLE, STSG TO SCALP X2;  Surgeon: Cameron Mohs, MD;  [facility];  Service: Plastics    TONSILLECTOMY      with adenoidectomy       Family History   Adopted: Yes   Problem Relation Age of Onset   Winter [place] ALS Mother     Other Mother         Gastrointestinal bleeding    Other Father         Gastrointestinal bleeding    Alcohol abuse Father     Colon cancer Maternal Aunt     Psychosis Cousin      I have reviewed and agree with the history as documented  E-Cigarette/Vaping    E-Cigarette Use Never User      E-Cigarette/Vaping Substances     Social History     Tobacco Use    Smoking status: Former Smoker     Packs/day: 0 25     Years: 2 00     Pack years: 0 50     Types: Cigarettes     Quit date:      Years since quittin 6    Smokeless tobacco: Never Used   Vaping Use    Vaping Use: Never used   Substance Use Topics    Alcohol use: Not Currently    Drug use: No       Review of Systems   Constitutional: Negative for chills and fever  HENT: Negative for ear pain and sore throat  Eyes: Negative for pain and visual disturbance  Respiratory: Negative for cough and shortness of breath  Cardiovascular: Negative for chest pain and palpitations  Gastrointestinal: Negative for abdominal pain and vomiting     Genitourinary: Negative for dysuria and hematuria  Musculoskeletal: Negative for arthralgias and back pain  Skin: Negative for color change and rash  Neurological: Negative for seizures and syncope  All other systems reviewed and are negative  Physical Exam  Physical Exam  Vitals and nursing note reviewed  Constitutional:       General: She is not in acute distress  Appearance: Normal appearance  She is not ill-appearing, toxic-appearing or diaphoretic  HENT:      Head: Normocephalic and atraumatic  Mouth/Throat:      Mouth: Mucous membranes are moist    Eyes:      General: No scleral icterus  Pupils: Pupils are equal, round, and reactive to light  Cardiovascular:      Rate and Rhythm: Normal rate and regular rhythm  Pulses: Normal pulses  Heart sounds: Normal heart sounds  Pulmonary:      Effort: Pulmonary effort is normal       Breath sounds: Normal breath sounds  Abdominal:      General: Abdomen is flat  There is no distension  Palpations: Abdomen is soft  Tenderness: There is no abdominal tenderness  There is no guarding or rebound  Hernia: No hernia is present  Musculoskeletal:         General: No swelling or tenderness  Normal range of motion  Cervical back: Normal range of motion  Skin:     General: Skin is warm  Capillary Refill: Capillary refill takes less than 2 seconds  Neurological:      General: No focal deficit present  Mental Status: She is alert     Psychiatric:         Mood and Affect: Mood normal          Vital Signs  ED Triage Vitals [08/06/21 1721]   Temperature Pulse Respirations Blood Pressure SpO2   98 3 °F (36 8 °C) 87 18 (!) 195/97 96 %      Temp Source Heart Rate Source Patient Position - Orthostatic VS BP Location FiO2 (%)   Oral Monitor Sitting Left arm --      Pain Score       --           Vitals:    08/06/21 1721   BP: (!) 195/97   Pulse: 87   Patient Position - Orthostatic VS: Sitting         Visual Acuity      ED Medications  Medications   rabies vaccine, human diploid (IMOVAX RABIES) IM injection 1 mL (has no administration in time range)   rabies immune globulin, human (HyperRAB) injection 1,380 Units (has no administration in time range)       Diagnostic Studies  Results Reviewed     None                 No orders to display              Procedures  Procedures         ED Course                                           MDM  Number of Diagnoses or Management Options  Need for post exposure prophylaxis for rabies: new and does not require workup  Diagnosis management comments: Patient was seen and examined  HyperRAB and IMOVax given to the patient in the emergency department  She was discharged for return for repeat rabies vaccine on day 3, 7, and 14  Anticipatory guidance and return precautions were discussed at length all questions were answered to the patient's satisfaction  They verbalized understanding and agreement with the plan the patient remained stable under my care in the emergency department  Risk of Complications, Morbidity, and/or Mortality  Presenting problems: moderate  Diagnostic procedures: minimal  Management options: moderate    Patient Progress  Patient progress: stable      Disposition  Final diagnoses:   Need for post exposure prophylaxis for rabies     Time reflects when diagnosis was documented in both MDM as applicable and the Disposition within this note     Time User Action Codes Description Comment    8/6/2021  5:54 PM Byron Hanna Add [Z20 3] Need for post exposure prophylaxis for rabies       ED Disposition     ED Disposition Condition Date/Time Comment    Discharge Stable Fri Aug 6, 2021  5:57 PM Thermon Haven discharge to home/self care              Follow-up Information     Follow up With Specialties Details Why [address] Brody MD Family Medicine  As needed [address] [place] [id number]  [phone]            Patient's Medications   Discharge Prescriptions    No medications on file     No discharge procedures on file      PDMP Review     None          ED Provider  Electronically Signed by           Yoandy Junior PA-C  08/06/21 5874

## 2021-08-06 NOTE — DISCHARGE INSTRUCTIONS
You will need rabies vaccine in 3 (August 9th), 7 (August 13th) and 14 days (August 20th)  You will just come in through the ED at your convenience on those days to get your injections

## 2021-08-06 NOTE — TELEPHONE ENCOUNTER
Patient had a bat in her house & the [facility] said she could have been bitten & not be aware of it  They recommended she start rabies shots as a precautionary measure at ER

## 2021-08-09 ENCOUNTER — HOSPITAL ENCOUNTER (EMERGENCY)
Facility: HOSPITAL | Age: 76
Discharge: HOME | End: 2021-08-09
Attending: EMERGENCY MEDICINE
Payer: COMMERCIAL

## 2021-08-09 VITALS
RESPIRATION RATE: 20 BRPM | HEART RATE: 78 BPM | OXYGEN SATURATION: 98 % | DIASTOLIC BLOOD PRESSURE: 85 MMHG | SYSTOLIC BLOOD PRESSURE: 209 MMHG | TEMPERATURE: 97.4 F

## 2021-08-09 DIAGNOSIS — Z23 ENCOUNTER FOR REPEAT ADMINISTRATION OF RABIES VACCINATION: Primary | ICD-10-CM

## 2021-08-09 PROCEDURE — 90675 RABIES VACCINE IM: CPT | Performed by: EMERGENCY MEDICINE

## 2021-08-09 PROCEDURE — 99281 EMR DPT VST MAYX REQ PHY/QHP: CPT | Performed by: EMERGENCY MEDICINE

## 2021-08-09 PROCEDURE — 90471 IMMUNIZATION ADMIN: CPT

## 2021-08-09 RX ADMIN — RABIES VIRUS STRAIN PM-1503-3M ANTIGEN (PROPIOLACTONE INACTIVATED) AND WATER 1 ML: KIT at 10:11

## 2021-08-09 NOTE — DISCHARGE INSTRUCTIONS
If you develop any fever, chest pain, shortness of breath, confusion, seizures, or any other concerning symptoms, please return to the emergency department as these could be signs of worsening illness

## 2021-08-09 NOTE — ED PROVIDER NOTES
History  Chief Complaint   Patient presents with    Follow Up Rabies     here for second rabies shot, had first shot this weekend     HPI     30-year-old female presenting to the emergency department for 2nd in series of rabies vaccinations  Patient had exposure to a bat this weekend, no known bite that patient is aware of  Patient received 1st vaccine and immunoglobulin on 08/06  Since vaccination, patient has felt well  Denies fever, chest pain, shortness breath, nausea, vomiting, abdominal pain  Prior to Admission Medications   Prescriptions Last Dose Informant Patient Reported? Taking? Multiple Vitamins-Minerals (MULTIVITAMIN ADULTS 50+ PO)  Self Yes No   Sig: Take by mouth daily   acetaminophen (TYLENOL) 500 mg tablet  Self Yes No   Sig: Take 500 mg by mouth every 8 (eight) hours as needed for mild pain      Facility-Administered Medications: None       Past Medical History:   Diagnosis Date    Alopecia areata     Anxiety     Asthma     Basal cell carcinoma (BCC) in situ of skin     Depression     Diabetes mellitus (HCC)     Fibromyalgia, primary     H/O migraine     improved since menopause    Hashimoto's disease     Hyperlipidemia     Incontinence of urine     PONV (postoperative nausea and vomiting)        Past Surgical History:   Procedure Laterality Date    FLAP LOCAL HEAD / NECK N/A 5/29/2019    Procedure: RECONSTRUCTION BASAL CELL CARCINOMA DEFECTS OF LEFT TEMPLE, VERTEX, ANTERIOR SCALP AND LEFT EAR;  Surgeon: Shea May MD;  [facility];  Service: Plastics    FULL THICKNESS SKIN GRAFT Left 9/15/2020    Procedure: EAR FULL THICKNESS SKIN GRAFT (FTSG);   Surgeon: Shea May MD;  [facility];  Service: Plastics    LITHOTRIPSY      Renal    MOHS RECONSTRUCTION Left 9/15/2020    Procedure: EAR MOHS DEFECT RECONSTRUCTION;  Surgeon: Shea May MD;  [facility];  Service: Plastics    LA FULL THICK GRFT HEAD,FAC,HAND <20SQC N/A 2019    Procedure: FTSG LEFT EAR;  Surgeon: Alfredo Tom MD;  [facility];  Service: Plastics    RECTAL VAGINAL FISTULECTOMY      SKIN BIOPSY      SKIN LESION EXCISION N/A 2019    Procedure: WIDE EXCISION BASAL CELL CARCINOMA VERTEX OF SCALP, LEFT EAR AND LEFT TEMPLE;  Surgeon: Joelle Murphy MD;  [facility];  Service: Surgical Oncology    SPLIT THICKNESS SKIN GRAFT N/A 2019    Procedure: FLAP RECONSTRUCTION OF LEFT TEMPLE, STSG TO SCALP X2;  Surgeon: Alfredo Tom MD;  [facility];  Service: Plastics    TONSILLECTOMY      with adenoidectomy       Family History   Adopted: Yes   Problem Relation Age of Onset   Eddie Hero ALS Mother     Other Mother         Gastrointestinal bleeding    Other Father         Gastrointestinal bleeding    Alcohol abuse Father     Colon cancer Maternal Aunt     Psychosis Cousin      I have reviewed and agree with the history as documented  E-Cigarette/Vaping    E-Cigarette Use Never User      E-Cigarette/Vaping Substances     Social History     Tobacco Use    Smoking status: Former Smoker     Packs/day: 0 25     Years: 2 00     Pack years: 0 50     Types: Cigarettes     Quit date:      Years since quittin 6    Smokeless tobacco: Never Used   Vaping Use    Vaping Use: Never used   Substance Use Topics    Alcohol use: Not Currently    Drug use: No       Review of Systems   Constitutional: Negative for fatigue and fever  Respiratory: Negative for cough, shortness of breath, wheezing and stridor  Cardiovascular: Negative for chest pain, palpitations and leg swelling  Gastrointestinal: Negative for abdominal pain, nausea and vomiting  Musculoskeletal: Negative for neck stiffness  Skin: Negative for color change, pallor, rash and wound  Neurological: Negative for dizziness and weakness  Physical Exam  Physical Exam  Vitals and nursing note reviewed     Constitutional:       General: She is not in acute distress  Appearance: Normal appearance  She is well-developed  She is not ill-appearing, toxic-appearing or diaphoretic  HENT:      Head: Normocephalic and atraumatic  Right Ear: External ear normal       Left Ear: External ear normal       Nose: Nose normal       Mouth/Throat:      Mouth: Mucous membranes are moist    Eyes:      General:         Right eye: No discharge  Left eye: No discharge  Extraocular Movements: Extraocular movements intact  Conjunctiva/sclera: Conjunctivae normal       Pupils: Pupils are equal, round, and reactive to light  Cardiovascular:      Rate and Rhythm: Normal rate and regular rhythm  Heart sounds: Normal heart sounds  No murmur heard  No friction rub  No gallop  Pulmonary:      Effort: Pulmonary effort is normal  No respiratory distress  Breath sounds: Normal breath sounds  No stridor  No wheezing, rhonchi or rales  Chest:      Chest wall: No tenderness  Abdominal:      General: Bowel sounds are normal  There is no distension  Palpations: Abdomen is soft  There is no mass  Tenderness: There is no abdominal tenderness  There is no guarding or rebound  Hernia: No hernia is present  Musculoskeletal:         General: Normal range of motion  Cervical back: Normal range of motion and neck supple  Skin:     General: Skin is warm and dry  Capillary Refill: Capillary refill takes less than 2 seconds  Neurological:      Mental Status: She is alert and oriented to person, place, and time     Psychiatric:         Mood and Affect: Mood normal          Vital Signs  ED Triage Vitals   Temperature Pulse Respirations Blood Pressure SpO2   08/09/21 0855 08/09/21 0854 08/09/21 0854 08/09/21 0854 08/09/21 0854   (!) 97 4 °F (36 3 °C) 78 20 (!) 209/85 98 %      Temp Source Heart Rate Source Patient Position - Orthostatic VS BP Location FiO2 (%)   08/09/21 0855 08/09/21 0854 08/09/21 0854 08/09/21 0854 --   Oral Monitor Sitting Left arm       Pain Score       --                  Vitals:    08/09/21 0854   BP: (!) 209/85   Pulse: 78   Patient Position - Orthostatic VS: Sitting         Visual Acuity      ED Medications  Medications   rabies vaccine, human diploid (IMOVAX RABIES) IM injection 1 mL (1 mL Intramuscular Given 8/9/21 1011)       Diagnostic Studies  Results Reviewed     None                 No orders to display              Procedures  Procedures         ED Course        68-year-old female presenting to the emergency department for 2nd in series of rabies vaccination  Vital signs upon arrival notable for hypertension  Patient has longstanding history of hypertension and states that her blood pressure increases in the emergency department  Has no symptoms at this time  Physical exam is grossly unremarkable  vaccine was given  Patient was instructed to return not for next in series  Strict return precautions given  Upon discharge, patient was fully alert, oriented, GCS 15, ambulatory, without any further complaints  SBIRT 20yo+      Most Recent Value   SBIRT (22 yo +)   In order to provide better care to our patients, we are screening all of our patients for alcohol and drug use  Would it be okay to ask you these screening questions? Yes Filed at: 08/09/2021 0950   Initial Alcohol Screen: US AUDIT-C    1  How often do you have a drink containing alcohol?  0 Filed at: 08/09/2021 0950   2  How many drinks containing alcohol do you have on a typical day you are drinking? 0 Filed at: 08/09/2021 0950   3a  Male UNDER 65: How often do you have five or more drinks on one occasion? 0 Filed at: 08/09/2021 0950   3b  FEMALE Any Age, or MALE 65+: How often do you have 4 or more drinks on one occassion? 0 Filed at: 08/09/2021 0950   Audit-C Score  0 Filed at: 08/09/2021 9910   GIBRAN: How many times in the past year have you       Used an illegal drug or used a prescription medication for non-medical reasons? Never Filed at: 08/09/2021 5170                    MDM    Disposition  Final diagnoses:   Encounter for repeat administration of rabies vaccination     Time reflects when diagnosis was documented in both MDM as applicable and the Disposition within this note     Time User Action Codes Description Comment    8/9/2021 10:05 AM Manual Rosaliay Add [Z23] Encounter for repeat administration of rabies vaccination       ED Disposition     ED Disposition Condition Date/Time Comment    Discharge Stable Mon Aug 9, 2021 [address] [place] discharge to home/self care  Follow-up Information     Follow up With Specialties Details Why Contact Info    Emergency Department  On 8/13/2021 For wound re-check Return on Aug 13 for repeat in your vaccination series          Patient's Medications   Discharge Prescriptions    No medications on file     No discharge procedures on file      PDMP Review     None          ED Provider  Electronically Signed by           Amy Schmid MD  08/09/21 1552

## 2021-08-13 ENCOUNTER — HOSPITAL ENCOUNTER (EMERGENCY)
Facility: HOSPITAL | Age: 76
Discharge: HOME | End: 2021-08-13
Attending: EMERGENCY MEDICINE
Payer: COMMERCIAL

## 2021-08-13 VITALS
TEMPERATURE: 98 F | HEART RATE: 82 BPM | DIASTOLIC BLOOD PRESSURE: 100 MMHG | OXYGEN SATURATION: 96 % | SYSTOLIC BLOOD PRESSURE: 203 MMHG

## 2021-08-13 DIAGNOSIS — Z23 NEED FOR PROPHYLACTIC VACCINATION AGAINST RABIES: Primary | ICD-10-CM

## 2021-08-13 PROCEDURE — 90471 IMMUNIZATION ADMIN: CPT

## 2021-08-13 PROCEDURE — 99284 EMERGENCY DEPT VISIT MOD MDM: CPT | Performed by: PHYSICIAN ASSISTANT

## 2021-08-13 PROCEDURE — 90675 RABIES VACCINE IM: CPT | Performed by: PHYSICIAN ASSISTANT

## 2021-08-13 RX ADMIN — RABIES VIRUS STRAIN PM-1503-3M ANTIGEN (PROPIOLACTONE INACTIVATED) AND WATER 1 ML: KIT at 08:27

## 2021-08-13 NOTE — ED PROVIDER NOTES
History  Chief Complaint   Patient presents with    Immunizations     3rd rabies vaccine     45-year-old female presents emergency department for repeat administration of OB sectioning  Here for shallow 3 4 seconds series  Denies previous reactions at the injection sites  History provided by:  Patient   used: No        Prior to Admission Medications   Prescriptions Last Dose Informant Patient Reported? Taking? Multiple Vitamins-Minerals (MULTIVITAMIN ADULTS 50+ PO)  Self Yes No   Sig: Take by mouth daily   acetaminophen (TYLENOL) 500 mg tablet  Self Yes No   Sig: Take 500 mg by mouth every 8 (eight) hours as needed for mild pain      Facility-Administered Medications: None       Past Medical History:   Diagnosis Date    Alopecia areata     Anxiety     Asthma     Basal cell carcinoma (BCC) in situ of skin     Depression     Diabetes mellitus (HCC)     Fibromyalgia, primary     H/O migraine     improved since menopause    Hashimoto's disease     Hyperlipidemia     Incontinence of urine     PONV (postoperative nausea and vomiting)        Past Surgical History:   Procedure Laterality Date    FLAP LOCAL HEAD / NECK N/A 5/29/2019    Procedure: RECONSTRUCTION BASAL CELL CARCINOMA DEFECTS OF LEFT TEMPLE, VERTEX, ANTERIOR SCALP AND LEFT EAR;  Surgeon: Geno Mosquera MD;  [facility];  Service: Plastics    FULL THICKNESS SKIN GRAFT Left 9/15/2020    Procedure: EAR FULL THICKNESS SKIN GRAFT (FTSG);   Surgeon: Geno Mosquera MD;  [facility];  Service: Plastics    LITHOTRIPSY      Renal    MOHS RECONSTRUCTION Left 9/15/2020    Procedure: EAR MOHS DEFECT RECONSTRUCTION;  Surgeon: Geno Mosquera MD;  [facility];  Service: Plastics    [place] FULL THICK 2011 [facility] HEAD,FAC,HAND <20SQC N/A 5/29/2019    Procedure: FTSG LEFT EAR;  Surgeon: Geno Mosquera MD;  [facility];  Service: Plastics    RECTAL VAGINAL FISTULECTOMY      SKIN BIOPSY  SKIN LESION EXCISION N/A 2019    Procedure: WIDE EXCISION BASAL CELL CARCINOMA VERTEX OF SCALP, LEFT EAR AND LEFT TEMPLE;  Surgeon: Monet Olson MD;  [facility];  Service: Surgical Oncology    SPLIT THICKNESS SKIN GRAFT N/A 2019    Procedure: FLAP RECONSTRUCTION OF LEFT TEMPLE, STSG TO SCALP X2;  Surgeon: Alejandro Beyer MD;  [facility];  Service: Plastics    TONSILLECTOMY      with adenoidectomy       Family History   Adopted: Yes   Problem Relation Age of Onset   [address] [facility] ALS Mother     Other Mother         Gastrointestinal bleeding    Other Father         Gastrointestinal bleeding    Alcohol abuse Father     Colon cancer Maternal Aunt     Psychosis Cousin      I have reviewed and agree with the history as documented  E-Cigarette/Vaping    E-Cigarette Use Never User      E-Cigarette/Vaping Substances     Social History     Tobacco Use    Smoking status: Former Smoker     Packs/day: 0 25     Years: 2 00     Pack years: 0 50     Types: Cigarettes     Quit date:      Years since quittin 6    Smokeless tobacco: Never Used   Vaping Use    Vaping Use: Never used   Substance Use Topics    Alcohol use: Not Currently    Drug use: No       Review of Systems   Constitutional: Negative for chills and fever  HENT: Negative for congestion, dental problem and sore throat  Respiratory: Negative for cough  Cardiovascular: Negative for chest pain  Gastrointestinal: Negative for abdominal pain  Musculoskeletal: Negative for back pain and neck pain  Skin: Negative for rash and wound  All other systems reviewed and are negative  Physical Exam  Physical Exam  Vitals and nursing note reviewed  Constitutional:       Appearance: She is well-developed  HENT:      Head: Normocephalic and atraumatic  Cardiovascular:      Rate and Rhythm: Normal rate and regular rhythm  Pulmonary:      Effort: Pulmonary effort is normal  No respiratory distress        Breath sounds: No wheezing, rhonchi or rales  Musculoskeletal:      Comments: No tenderness, redness, or swelling over the right deltoid at previous injection site  Skin:     General: Skin is warm and dry  Neurological:      Mental Status: She is alert and oriented to person, place, and time  Psychiatric:         Mood and Affect: Mood normal          Behavior: Behavior normal          Vital Signs  ED Triage Vitals [08/13/21 0817]   Temperature Pulse Resp Blood Pressure SpO2   98 °F (36 7 °C) 82 -- (!) 203/100 96 %      Temp Source Heart Rate Source Patient Position - Orthostatic VS BP Location FiO2 (%)   Oral Monitor Sitting Left arm --      Pain Score       --           Vitals:    08/13/21 0817   BP: (!) 203/100   Pulse: 82   Patient Position - Orthostatic VS: Sitting         Visual Acuity      ED Medications  Medications   rabies vaccine, human diploid (IMOVAX RABIES) IM injection 1 mL (1 mL Intramuscular Given 8/13/21 0827)       Diagnostic Studies  Results Reviewed     None                 No orders to display              Procedures  Procedures         ED Course                                           MDM  Number of Diagnoses or Management Options  Need for prophylactic vaccination against rabies  Diagnosis management comments: Differential diagnosis includes but not limited to:  Encounter for rabies vaccination        Disposition  Final diagnoses:   Need for prophylactic vaccination against rabies     Time reflects when diagnosis was documented in both MDM as applicable and the Disposition within this note     Time User Action Codes Description Comment    8/13/2021  8:21 AM Juan Luis Blunt Add [Z23] Need for prophylactic vaccination against rabies       ED Disposition     ED Disposition Condition Date/Time Comment    Discharge Stable Fri Aug 13, 2021  8:21 AM Marcelle Adrian discharge to home/self care              Follow-up Information    None         Discharge Medication List as of 8/13/2021  8:22 AM CONTINUE these medications which have NOT CHANGED    Details   acetaminophen (TYLENOL) 500 mg tablet Take 500 mg by mouth every 8 (eight) hours as needed for mild pain, Historical Med      Multiple Vitamins-Minerals (MULTIVITAMIN ADULTS 50+ PO) Take by mouth daily, Historical Med           No discharge procedures on file      PDMP Review     None          ED Provider  Electronically Signed by           Humberto Henson PA-C  08/13/21 2862

## 2021-08-17 ENCOUNTER — TELEPHONE (OUTPATIENT)
Dept: PSYCHIATRY | Facility: CLINIC | Age: 76
End: 2021-08-17

## 2021-08-17 NOTE — TELEPHONE ENCOUNTER
Patient called stating that during her last visit in May that it was discussed between both of you that if she started to experience more anxiety than normal that she would call in and let you know and you could discuss the possibility of going back on lithium

## 2021-08-18 ENCOUNTER — OFFICE VISIT (OUTPATIENT)
Dept: PSYCHIATRY | Facility: CLINIC | Age: 76
End: 2021-08-18
Payer: COMMERCIAL

## 2021-08-18 VITALS — SYSTOLIC BLOOD PRESSURE: 160 MMHG | DIASTOLIC BLOOD PRESSURE: 83 MMHG | HEART RATE: 71 BPM

## 2021-08-18 DIAGNOSIS — F42.9 OBSESSIVE-COMPULSIVE DISORDER, UNSPECIFIED TYPE: ICD-10-CM

## 2021-08-18 DIAGNOSIS — R03.0 BLOOD PRESSURE ELEVATED WITHOUT HISTORY OF HTN: ICD-10-CM

## 2021-08-18 DIAGNOSIS — F31.81 BIPOLAR II DISORDER (HCC): Primary | ICD-10-CM

## 2021-08-18 DIAGNOSIS — F43.21 COMPLICATED GRIEF: ICD-10-CM

## 2021-08-18 DIAGNOSIS — F41.9 ANXIETY: ICD-10-CM

## 2021-08-18 DIAGNOSIS — G47.00 INSOMNIA, UNSPECIFIED TYPE: ICD-10-CM

## 2021-08-18 PROCEDURE — 99214 OFFICE O/P EST MOD 30 MIN: CPT | Performed by: PSYCHIATRY & NEUROLOGY

## 2021-08-18 PROCEDURE — 1160F RVW MEDS BY RX/DR IN RCRD: CPT | Performed by: PSYCHIATRY & NEUROLOGY

## 2021-08-18 PROCEDURE — 90833 PSYTX W PT W E/M 30 MIN: CPT | Performed by: PSYCHIATRY & NEUROLOGY

## 2021-08-18 RX ORDER — LITHIUM CARBONATE 300 MG/1
300 TABLET, FILM COATED, EXTENDED RELEASE ORAL DAILY
Qty: 30 TABLET | Refills: 2 | Status: SHIPPED | OUTPATIENT
Start: 2021-08-18 | End: 2022-01-26 | Stop reason: ALTCHOICE

## 2021-08-18 RX ORDER — TRAZODONE HYDROCHLORIDE 50 MG/1
25 TABLET ORAL
Qty: 30 TABLET | Refills: 5 | Status: SHIPPED | OUTPATIENT
Start: 2021-08-18 | End: 2022-01-26 | Stop reason: ALTCHOICE

## 2021-08-18 NOTE — PATIENT INSTRUCTIONS
Diagnoses and all orders for this visit:    Bipolar II disorder ([facility] [address] )    Obsessive-compulsive disorder, unspecified type    Complicated grief    Anxiety    Blood pressure elevated without history of HTN    Insomnia, unspecified type  -     traZODone (DESYREL) 50 mg tablet;  Take 0 5 tablets (25 mg total) by mouth daily at bedtime Can take 50mg if needed for insomnia        Start lithium CR 300mg daily  Labs to be done in 2 weeks after starting meds  Follow up with PCP about HTN  Follow up in 6 months with Dr Nabil De Santiago  Follow up in 2 months with Dr Nabil De Santiago

## 2021-08-18 NOTE — TELEPHONE ENCOUNTER
Spoke with Arian Mckeon at length  She reported all the thing she has been through the past couple of weeks from getting cancer removed from her head to a bat found in her room which now has her receiving rabies shots for exposure  We discussed Dr Letha Fong recommendation of starting Lithium 300 mg  Lin Richards it was sent to pharmacy  Advised after a few weeks of taking medication she should obtain lab work  She requested the orders be mailed to her home address  Verified address and will place in outgoing mail bin  She verbalized understanding and will call if any issues

## 2021-08-18 NOTE — TELEPHONE ENCOUNTER
Message left by Rhea Fontenot at [employer]  She called about a possible drug interaction:  Trazodone along with Lithium can cause increased serotonin levels/serotonin syndrome  For Dr Styles Mercury review  Can call pharmacy with instructions

## 2021-08-18 NOTE — TELEPHONE ENCOUNTER
Ene Mccartney made an appointment for today  Called and LM for Carlos Barrera to request lab orders at her upcoming appointment

## 2021-08-18 NOTE — PSYCH
Subjective:     Patient ID: Danika Irving is a 76 y o  female with MDD, OCD, and grief being seen for a follow up  She is currently not on medications  HPI ROS Appetite Changes and Sleep: the patient stated that 10 days ago she had a bat in her bedroom and had to get vaccinated for rabies exposure  She is unsure if it bite her, but they did consider it an exposure  She also recently had surgery to remove two spots of skin cancer on her head  She has also been vaccinated for COVID  Her anxiety has been elevated for the last two months  She has some financial issues that are contributing to her anxiety  She has also been feeling a little depressed this last month  She is not sleeping very well, now on top of that she is afraid to sleep in the bedroom and sleeps in the living room  She will have to drink chamomile tea in the middle of the night to help get her back to sleep  Appetite is good and stable on her weight  Denied SI/HI  Review Of Systems:     Mood Anxiety and Depression    Behavior Compulsive Behavior controlled   Thought Content Normal   General Emotional Problems and Sleep Disturbances   Personality Normal   Other Psych Symptoms Normal   Constitutional As Noted in HPI   ENT Loss of Hearing    Cardiovascular Negative   Respiratory Negative   Gastrointestinal Negative   Genitourinary Negative   Musculoskeletal Negative   Integumentary As Noted in HPI and Skin Lesions   Neurological Negative   Endocrine Normal    Other Symptoms bat exposure with rabies vaccines on board'             Laboratory Results: Results for Alexa Nichols (MRN [id number]) as of 8/18/2021 14:55   Ref   Range 7/15/2021 10:23   Sodium Latest Ref Range: 136 - 145 mmol/L 135 (L)   Potassium Latest Ref Range: 3 5 - 5 3 mmol/L 4 4   Chloride Latest Ref Range: 100 - 108 mmol/L 104   CO2 Latest Ref Range: 21 - 32 mmol/L 23   Anion Gap Latest Ref Range: 4 - 13 mmol/L 8   BUN Latest Ref Range: 5 - 25 mg/dL 10   Creatinine Latest Ref Range: 0 60 - 1 30 mg/dL 0 71   GLUCOSE FASTING Latest Ref Range: 65 - 99 mg/dL 129 (H)   Calcium Latest Ref Range: 8 3 - 10 1 mg/dL 9 6   AST Latest Ref Range: 5 - 45 U/L 16   ALT Latest Ref Range: 12 - 78 U/L 23   Alkaline Phosphatase Latest Ref Range: 46 - 116 U/L 81   Total Protein Latest Ref Range: 6 4 - 8 2 g/dL 7 9   Albumin Latest Ref Range: 3 5 - 5 0 g/dL 4 0   TOTAL BILIRUBIN Latest Ref Range: 0 20 - 1 00 mg/dL 0 71   eGFR Latest Units: ml/min/1 73sq m 84   Cholesterol Latest Ref Range: 50 - 200 mg/dL 253 (H)   Triglycerides Latest Ref Range: <=150 mg/dL 156 (H)   HDL Latest Ref Range: >=40 mg/dL 59   LDL Calculated Latest Ref Range: 0 - 100 mg/dL 163 (H)   Hemoglobin A1C Latest Ref Range: Normal 3 8-5 6%; PreDiabetic 5 7-6 4%; Diabetic >=6 5%; Glycemic control for adults with diabetes <7 0% % 6 8 (H)   eAG, EST AVG Glucose Latest Units: mg/dl 148   TSH 3RD GENERATON Latest Ref Range: 0 358 - 3 740 uIU/mL 5 180 (H)   Free T4 Latest Ref Range: 0 76 - 1 46 ng/dL 0 82   HEPATITIS C ANTIBODY Latest Ref Range: Non-reactive  Non-reactive   EXT Creatinine Urine Latest Units: mg/dL <13 0   MICROALBUMIN/CREATININE RATIO Unknown See Comment   MICROALBUM ,U,RANDOM Latest Ref Range: 0 0 - 20 0 mg/L <5 0     Substance Abuse History:  Social History     Substance and Sexual Activity   Drug Use No       Family Psychiatric History:   Family History   Adopted: Yes   Problem Relation Age of Onset    ALS Mother     Other Mother         Gastrointestinal bleeding    Other Father         Gastrointestinal bleeding    Alcohol abuse Father     Colon cancer Maternal Aunt     Psychosis Cousin        The following portions of the patient's history were reviewed and updated as appropriate: allergies, current medications, past family history, past medical history, past social history, past surgical history and problem list     Social History     Socioeconomic History    Marital status:       Spouse name: Not on file    Number of children: 3    Years of education: 12    Highest education level: Bachelor's 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 , BA, AB, BS)   Occupational History    Occupation: retired   Tobacco Use    Smoking status: Former Smoker     Packs/day: 0 25     Years: 2 00     Pack years: 0 50     Types: Cigarettes     Quit date:      Years since quittin 6    Smokeless tobacco: Never Used   Vaping Use    Vaping Use: Never used   Substance and Sexual Activity    Alcohol use: Not Currently    Drug use: No    Sexual activity: Not Currently   Other Topics Concern    Not on file   Social History Narrative    Not on file     Social Determinants of Health     Financial Resource Strain:     Difficulty of Paying Living Expenses:    Food Insecurity:     Worried About Running Out of Food in the Last Year:     [address] in the Last Year:    Transportation Needs:     Lack of Transportation (Medical):  Lack of Transportation (Non-Medical):    Physical Activity:     Days of Exercise per Week:     Minutes of Exercise per Session:    Stress:     Feeling of Stress :    Social Connections:     Frequency of Communication with Friends and Family:     Frequency of Social Gatherings with Friends and Family:     Attends Shinto Services:     Active Member of Clubs or Organizations:     Attends Club or Organization Meetings:     Marital Status:    Intimate Partner Violence:     Fear of Current or Ex-Partner:     Emotionally Abused:     Physically Abused:     Sexually Abused:      Social History     Social History Narrative    Not on file       Objective:       Mental status:  Appearance calm and cooperative , adequate hygiene and grooming and good eye contact    Mood anxious   Affect affect was broad, somewhat inappropriate at times     Speech a normal rate  Loquacious, not pressured   Thought Processes coherent/organized and normal thought processes   Hallucinations no hallucinations present    Thought Content no delusions Abnormal Thoughts no suicidal thoughts  and no homicidal thoughts    Orientation  oriented to person and place and time   Remote Memory short term memory intact and long term memory intact   Attention Span concentration intact   Intellect Appears to be of Average Intelligence   Insight Insight intact   Judgement judgment was intact   Muscle Strength Muscle strength and tone were normal and Normal gait    Language no difficulty naming common objects and no difficulty repeating a phrase    Fund of Knowledge displays adequate knowledge of current events and adequate fund of knowledge regarding past history   Pain none   Pain Scale 0       Assessment/Plan:       Diagnoses and all orders for this visit:    Bipolar II disorder ([facility] [address] )    Obsessive-compulsive disorder, unspecified type    Complicated grief    Anxiety    Blood pressure elevated without history of HTN    Insomnia, unspecified type  -     traZODone (DESYREL) 50 mg tablet; Take 0 5 tablets (25 mg total) by mouth daily at bedtime Can take 50mg if needed for insomnia        Start lithium CR 300mg daily  Labs to be done in 2 weeks after starting meds  Follow up with PCP about HTN  Follow up in 6 months with writer  Follow up in 2 months with writer    Treatment Recommendations- Risks Benefits      Immediate Medical/Psychiatric/Psychotherapy Treatments and Any Precautions: will try a small dose of lithium for some anxiety and BPAD 2  She is not sleeping well, which also is something we will work on with her, trying trazodone  Will check labs in 2 weeks  Will follow up with PCP about elevated BP  Risks, Benefits And Possible Side Effects Of Medications:  PSYCH RISK, BENEFITS AND POSSIBLE SIDE EFFECTS discussed serotonin syndrome    Controlled Medication Discussion: No records found for controlled prescriptions according to Steve Jasso [address]       Psychotherapy Provided: Individual psychotherapy provided       Goals discussed in session: complicated grief, being on her own, recent issues with a bat, and dreams about her      Counseling provided: 20       This note was not shared with the patient due to reasonable likelihood of causing patient harm

## 2021-08-19 DIAGNOSIS — G47.00 INSOMNIA, UNSPECIFIED TYPE: Primary | ICD-10-CM

## 2021-08-19 RX ORDER — ZOLPIDEM TARTRATE 5 MG/1
5 TABLET ORAL
Qty: 15 TABLET | Refills: 0 | Status: SHIPPED | OUTPATIENT
Start: 2021-08-19 | End: 2022-01-26 | Stop reason: ALTCHOICE

## 2021-08-20 ENCOUNTER — HOSPITAL ENCOUNTER (EMERGENCY)
Facility: HOSPITAL | Age: 76
Discharge: HOME | End: 2021-08-20
Attending: EMERGENCY MEDICINE | Admitting: EMERGENCY MEDICINE
Payer: COMMERCIAL

## 2021-08-20 VITALS
HEART RATE: 79 BPM | DIASTOLIC BLOOD PRESSURE: 92 MMHG | RESPIRATION RATE: 18 BRPM | OXYGEN SATURATION: 97 % | SYSTOLIC BLOOD PRESSURE: 212 MMHG | WEIGHT: 155 LBS | TEMPERATURE: 97.9 F | BODY MASS INDEX: 25.6 KG/M2

## 2021-08-20 DIAGNOSIS — I10 HYPERTENSION, UNSPECIFIED TYPE: ICD-10-CM

## 2021-08-20 DIAGNOSIS — Z23 ENCOUNTER FOR REPEAT ADMINISTRATION OF RABIES VACCINATION: Primary | ICD-10-CM

## 2021-08-20 PROCEDURE — 99281 EMR DPT VST MAYX REQ PHY/QHP: CPT | Performed by: EMERGENCY MEDICINE

## 2021-08-20 PROCEDURE — 90675 RABIES VACCINE IM: CPT | Performed by: EMERGENCY MEDICINE

## 2021-08-20 PROCEDURE — 90471 IMMUNIZATION ADMIN: CPT

## 2021-08-20 RX ADMIN — RABIES VIRUS STRAIN PM-1503-3M ANTIGEN (PROPIOLACTONE INACTIVATED) AND WATER 1 ML: KIT at 08:13

## 2021-08-20 NOTE — TELEPHONE ENCOUNTER
I spoke with Sharyn Lucero  She did speak with Dr Gary Wood about medications at the appointment  Sharyn Lucero spoke with the pharmacist about serotonin concerns and trazodone was dispensed  However, when she went to the ED for the last rabbies shot, her BP was elevated  The ED doctor said lithium can interact with BP meds as well  Sharyn Lucero already had made an appointment with her PCP for this coming Monday as Dr Gary Wood had encouraged her to do  She is not going to take trazodone at this time and will review everything with Dr Lisa Jacobo and thinks Dr Lisa Jacobo would let Dr Gary Wood know about the outcome  I gave her the nursing number to call with any medication concerns

## 2021-08-20 NOTE — ED PROVIDER NOTES
History  Chief Complaint   Patient presents with    Follow Up Rabies     PT here for final rabies shot     76 yr female -- on 8/6/21 awoke with bat in house-- is here for 3rd  And final dose of rabies vaccine -  Pt is to see pmd on Monday  for recently elevated bp-- is currently not on any agents- in past has been on acei--- also to start lithium soon as well -- but has nto started it as of yet--         History provided by:  Patient   used: No        Cannot display prior to admission medications because the patient has not been admitted in this contact  Past Medical History:   Diagnosis Date    Alopecia areata     Anxiety     Asthma     Basal cell carcinoma (BCC) in situ of skin     Depression     Diabetes mellitus (HCC)     Fibromyalgia, primary     H/O migraine     improved since menopause    Hashimoto's disease     Hyperlipidemia     Incontinence of urine     PONV (postoperative nausea and vomiting)        Past Surgical History:   Procedure Laterality Date    FLAP LOCAL HEAD / NECK N/A 5/29/2019    Procedure: RECONSTRUCTION BASAL CELL CARCINOMA DEFECTS OF LEFT TEMPLE, VERTEX, ANTERIOR SCALP AND LEFT EAR;  Surgeon: Philippe Hernandez MD;  [facility];  Service: Plastics    FULL THICKNESS SKIN GRAFT Left 9/15/2020    Procedure: EAR FULL THICKNESS SKIN GRAFT (FTSG);   Surgeon: Philippe Hernandez MD;  [facility];  Service: Plastics    LITHOTRIPSY      Renal    MOHS RECONSTRUCTION Left 9/15/2020    Procedure: EAR MOHS DEFECT RECONSTRUCTION;  Surgeon: Philippe Hernandez MD;  [facility];  Service: Plastics    IA FULL THICK [facility] HEAD,FAC,HAND <20SQC N/A 5/29/2019    Procedure: FTSG LEFT EAR;  Surgeon: Philippe Hernandez MD;  [facility];  Service: Plastics    RECTAL VAGINAL FISTULECTOMY      SKIN BIOPSY      SKIN LESION EXCISION N/A 5/29/2019    Procedure: WIDE EXCISION BASAL CELL CARCINOMA VERTEX OF SCALP, LEFT EAR AND LEFT TEMPLE;  Surgeon: Quentin Mosher MD;  [facility];  Service: Surgical Oncology    SPLIT THICKNESS SKIN GRAFT N/A 2019    Procedure: FLAP RECONSTRUCTION OF LEFT TEMPLE, STSG TO SCALP X2;  Surgeon: Surya Moore MD;  [facility];  Service: Plastics    TONSILLECTOMY      with adenoidectomy       Family History   Adopted: Yes   Problem Relation Age of Onset   [facility] ALS Mother     Other Mother         Gastrointestinal bleeding    Other Father         Gastrointestinal bleeding    Alcohol abuse Father     Colon cancer Maternal Aunt     Psychosis Cousin      I have reviewed and agree with the history as documented  E-Cigarette/Vaping    E-Cigarette Use Never User      E-Cigarette/Vaping Substances     Social History     Tobacco Use    Smoking status: Former Smoker     Packs/day: 0 25     Years: 2 00     Pack years: 0 50     Types: Cigarettes     Quit date:      Years since quittin 6    Smokeless tobacco: Never Used   Vaping Use    Vaping Use: Never used   Substance Use Topics    Alcohol use: Not Currently    Drug use: No       Review of Systems   Constitutional: Negative  HENT: Negative  Eyes: Negative  Respiratory: Negative  Cardiovascular: Negative  Gastrointestinal: Negative  Endocrine: Negative  Genitourinary: Negative  Musculoskeletal: Negative  Skin: Negative  Allergic/Immunologic: Negative  Neurological: Negative  Hematological: Negative  Psychiatric/Behavioral: Negative  Physical Exam  Physical Exam  Vitals and nursing note reviewed  Constitutional:       General: She is not in acute distress  Appearance: Normal appearance  She is not ill-appearing, toxic-appearing or diaphoretic  Comments: avss-- htnsive-- pulse ox 97 % on ra- interpretation is normal- no intervention    HENT:      Head: Normocephalic and atraumatic  Nose: Nose normal    Eyes:      General: No scleral icterus          Right eye: No discharge  Left eye: No discharge  Extraocular Movements: Extraocular movements intact  Conjunctiva/sclera: Conjunctivae normal       Pupils: Pupils are equal, round, and reactive to light  Neck:      Vascular: No carotid bruit  Cardiovascular:      Rate and Rhythm: Normal rate and regular rhythm  Pulses: Normal pulses  Heart sounds: Normal heart sounds  No murmur heard  No friction rub  No gallop  Pulmonary:      Effort: Pulmonary effort is normal  No respiratory distress  Breath sounds: Normal breath sounds  No stridor  No wheezing, rhonchi or rales  Chest:      Chest wall: No tenderness  Abdominal:      General: Bowel sounds are normal  There is no distension  Palpations: Abdomen is soft  There is no mass  Tenderness: There is no abdominal tenderness  There is no right CVA tenderness, left CVA tenderness, guarding or rebound  Hernia: No hernia is present  Musculoskeletal:         General: No swelling, tenderness, deformity or signs of injury  Normal range of motion  Cervical back: Normal range of motion and neck supple  No rigidity or tenderness  Right lower leg: No edema  Left lower leg: No edema  Lymphadenopathy:      Cervical: No cervical adenopathy  Skin:     General: Skin is warm  Capillary Refill: Capillary refill takes less than 2 seconds  Coloration: Skin is not jaundiced or pale  Findings: No bruising, erythema, lesion or rash  Neurological:      General: No focal deficit present  Mental Status: She is alert and oriented to person, place, and time  Mental status is at baseline  Cranial Nerves: No cranial nerve deficit  Sensory: No sensory deficit  Motor: No weakness        Coordination: Coordination normal       Gait: Gait normal    Psychiatric:      Comments: mildly anxious         Vital Signs  ED Triage Vitals [08/20/21 0743]   Temperature Pulse Respirations Blood Pressure SpO2   97 9 °F (36 6 °C) 79 18 (!) 212/92 97 %      Temp Source Heart Rate Source Patient Position - Orthostatic VS BP Location FiO2 (%)   Oral Monitor Lying Right arm --      Pain Score       --           Vitals:    08/20/21 0743   BP: (!) 212/92   Pulse: 79   Patient Position - Orthostatic VS: Lying         Visual Acuity      ED Medications  Medications   rabies vaccine, human diploid (IMOVAX RABIES) IM injection 1 mL (has no administration in time range)       Diagnostic Studies  Results Reviewed     None                 No orders to display              Procedures  Procedures         ED Course  ED Course as of Aug 20 0837   Fri Aug 20, 2021   0802 Er md note-  reviewed pt blood pressure over last several er visits- all elevated --  likley compenent of anxiety -- pt to see pmd on Monday about this --   pt aware of  multiple bp med interactions with  lithium-- pt states  will hold off on taking lithium until seen by pmd on Monday for bp recheck and will discuss  this with pmd                                              MDM    Disposition  Final diagnoses:   None     ED Disposition     None      Follow-up Information    None         Patient's Medications   Discharge Prescriptions    No medications on file     No discharge procedures on file      PDMP Review       Value Time User    PDMP Reviewed  Yes 8/19/2021  2:23 PM Gio Schultz MD          ED Provider  Electronically Signed by           Lynn Newton MD  08/20/21 4542

## 2021-08-20 NOTE — TELEPHONE ENCOUNTER
August 19, 2021  Roula Burns MD  to Me    MG    8:30 AM  Yes, we discussed this with Marcela Hoffman yesterday  She understood  If she wants to hold off on Trazodone she can until I see her back or she can call me in a few weeks to tell me how her sleep has been and if she wants we can try a different medication for sleep at that time  Me  to Roula Burns MD        9:39 AM  So, from your end, the pharmacy can dispense the medication? Roula Burns MD  to Me    MG    2:23 PM  Sounds like they are very hesitant and Marcela Hoffman is already not keen on taking meds  Why dont we hold off on it and ill write for a small dose of ambien in the meantime  August 20, 2021  Me  to Roula Burns MD        9:59 AM  The pharmacy will dispense it if you authorize it  Roula Burns MD  to Me    MG    10:08 AM  Ask jarret what she is more comfortable with please

## 2021-08-22 PROBLEM — R03.0 ELEVATED BLOOD PRESSURE READING WITHOUT DIAGNOSIS OF HYPERTENSION: Status: ACTIVE | Noted: 2021-08-22

## 2021-08-22 PROBLEM — F31.81 BIPOLAR 2 DISORDER (HCC): Status: ACTIVE | Noted: 2021-08-22

## 2021-08-23 ENCOUNTER — OFFICE VISIT (OUTPATIENT)
Dept: FAMILY MEDICINE CLINIC | Facility: CLINIC | Age: 76
End: 2021-08-23
Payer: COMMERCIAL

## 2021-08-23 VITALS
RESPIRATION RATE: 18 BRPM | TEMPERATURE: 96.5 F | BODY MASS INDEX: 25.16 KG/M2 | HEART RATE: 90 BPM | DIASTOLIC BLOOD PRESSURE: 80 MMHG | HEIGHT: 65 IN | WEIGHT: 151 LBS | OXYGEN SATURATION: 97 % | SYSTOLIC BLOOD PRESSURE: 142 MMHG

## 2021-08-23 DIAGNOSIS — F31.81 BIPOLAR 2 DISORDER (HCC): ICD-10-CM

## 2021-08-23 DIAGNOSIS — R03.0 ELEVATED BLOOD PRESSURE READING WITHOUT DIAGNOSIS OF HYPERTENSION: ICD-10-CM

## 2021-08-23 DIAGNOSIS — E11.9 TYPE 2 DIABETES MELLITUS WITHOUT COMPLICATION, WITHOUT LONG-TERM CURRENT USE OF INSULIN (HCC): Primary | ICD-10-CM

## 2021-08-23 PROCEDURE — 1160F RVW MEDS BY RX/DR IN RCRD: CPT | Performed by: FAMILY MEDICINE

## 2021-08-23 PROCEDURE — 1036F TOBACCO NON-USER: CPT | Performed by: FAMILY MEDICINE

## 2021-08-23 PROCEDURE — 99214 OFFICE O/P EST MOD 30 MIN: CPT | Performed by: FAMILY MEDICINE
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
## 2021-08-23 NOTE — ASSESSMENT & PLAN NOTE
Patient came in today to discuss her medications  She has been started on lithium but never actually took this medication; she had requested that her psychiatrist put her on this medication as she had been on it previously in young adulthood  She became concerned when reading the package insert for lithium, she also worried when the ER provider expressed concerns for her being on this medication  She wanted my opinion on medication management, and was wondering about mood stabilizing medication  Advised that this is a good idea given that her symptoms of bipolar and depression do not appear to be optimally managed  I have reached out to her psychiatrist regarding medication management

## 2021-08-23 NOTE — ASSESSMENT & PLAN NOTE
BP Readings from Last 3 Encounters:   08/23/21 142/80   08/20/21 (!) 212/92   08/18/21 160/83     She had elevated blood pressure readings in the ER, mildly elevated today  Will continue to monitor for now, asked patient to check home readings  Call the office and follow-up if persistently 140-150 over 80-90  Would consider adding medication if persistently elevated given her co-morbid diabetes

## 2021-08-25 ENCOUNTER — TELEPHONE (OUTPATIENT)
Dept: PSYCHIATRY | Facility: CLINIC | Age: 76
End: 2021-08-25

## 2021-08-25 NOTE — TELEPHONE ENCOUNTER
Spoke with jarret Casas of Dr Omid Velazquez information regarding the [place]- May take at [facility], Discussed diabetes insipidus- watch too much water  She was thankful and stated she "would start the Lithium tonight" Provided nursing number if any issues or questions         ELO

## 2021-08-25 NOTE — TELEPHONE ENCOUNTER
Per Dr Martinez felder- Agustina Lee was to her PCP today and voiced concern about her Lithium  She has not started it yet  She was wondering about a mood stabilizer, but that is what lithium is  She requested to go back on it as she was on a low dose some years ago  Please give her a call and see what sort of questions she may have  Nursing attempted to follow up with Santa George LM requesting return call   Provided nursing number

## 2021-08-25 NOTE — TELEPHONE ENCOUNTER
Spoke at [facility] with Jay Jay Mclean  She has some questions regarding the Lithium  She is not currently taking it but has the prescription at home  She said she sweats a lot especially at night  She said will the Lithium effect how much fluid intake she should have? She also can not be drowsy during the day as she is alone  Can she take the Lithium at night? When should she start the [place]? She has the lab slips for after the 2 weeks on Lithium for blood  Jay Jay Mclean gave permission to leave message          Jay Jay Mclean also added she will not be taking the Zolpidem or trazadone   FYI

## 2021-09-10 ENCOUNTER — TELEPHONE (OUTPATIENT)
Dept: PSYCHIATRY | Facility: CLINIC | Age: 76
End: 2021-09-10

## 2021-09-16 ENCOUNTER — LAB (OUTPATIENT)
Dept: LAB | Facility: CLINIC | Age: 76
End: 2021-09-16
Payer: COMMERCIAL

## 2021-09-16 DIAGNOSIS — F31.81 BIPOLAR II DISORDER (HCC): ICD-10-CM

## 2021-09-16 LAB
ANION GAP SERPL CALCULATED.3IONS-SCNC: 3 MMOL/L (ref 4–13)
BASOPHILS # BLD AUTO: 0.06 THOUSANDS/ΜL (ref 0–0.1)
BASOPHILS NFR BLD AUTO: 1 % (ref 0–1)
BUN SERPL-MCNC: 10 MG/DL (ref 5–25)
CALCIUM SERPL-MCNC: 9.2 MG/DL (ref 8.3–10.1)
CHLORIDE SERPL-SCNC: 105 MMOL/L (ref 100–108)
CO2 SERPL-SCNC: 28 MMOL/L (ref 21–32)
CREAT SERPL-MCNC: 0.62 MG/DL (ref 0.6–1.3)
EOSINOPHIL # BLD AUTO: 0.24 THOUSAND/ΜL (ref 0–0.61)
EOSINOPHIL NFR BLD AUTO: 3 % (ref 0–6)
ERYTHROCYTE [DISTWIDTH] IN BLOOD BY AUTOMATED COUNT: 13.2 % (ref 11.6–15.1)
GFR SERPL CREATININE-BSD FRML MDRD: 88 ML/MIN/1.73SQ M
GLUCOSE P FAST SERPL-MCNC: 151 MG/DL (ref 65–99)
HCT VFR BLD AUTO: 42.9 % (ref 34.8–46.1)
HGB BLD-MCNC: 14.2 G/DL (ref 11.5–15.4)
IMM GRANULOCYTES # BLD AUTO: 0.02 THOUSAND/UL (ref 0–0.2)
IMM GRANULOCYTES NFR BLD AUTO: 0 % (ref 0–2)
LITHIUM SERPL-SCNC: 0.5 MMOL/L (ref 0.5–1)
LYMPHOCYTES # BLD AUTO: 1.74 THOUSANDS/ΜL (ref 0.6–4.47)
LYMPHOCYTES NFR BLD AUTO: 23 % (ref 14–44)
MCH RBC QN AUTO: 30.7 PG (ref 26.8–34.3)
MCHC RBC AUTO-ENTMCNC: 33.1 G/DL (ref 31.4–37.4)
MCV RBC AUTO: 93 FL (ref 82–98)
MONOCYTES # BLD AUTO: 0.55 THOUSAND/ΜL (ref 0.17–1.22)
MONOCYTES NFR BLD AUTO: 7 % (ref 4–12)
NEUTROPHILS # BLD AUTO: 4.9 THOUSANDS/ΜL (ref 1.85–7.62)
NEUTS SEG NFR BLD AUTO: 66 % (ref 43–75)
NRBC BLD AUTO-RTO: 0 /100 WBCS
PLATELET # BLD AUTO: 253 THOUSANDS/UL (ref 149–390)
PMV BLD AUTO: 10.3 FL (ref 8.9–12.7)
POTASSIUM SERPL-SCNC: 4.3 MMOL/L (ref 3.5–5.3)
RBC # BLD AUTO: 4.63 MILLION/UL (ref 3.81–5.12)
SODIUM SERPL-SCNC: 136 MMOL/L (ref 136–145)
T4 FREE SERPL-MCNC: 0.76 NG/DL (ref 0.76–1.46)
TSH SERPL DL<=0.05 MIU/L-ACNC: 5.18 UIU/ML (ref 0.36–3.74)
WBC # BLD AUTO: 7.51 THOUSAND/UL (ref 4.31–10.16)

## 2021-09-16 PROCEDURE — 36415 COLL VENOUS BLD VENIPUNCTURE: CPT

## 2021-09-16 PROCEDURE — 85025 COMPLETE CBC W/AUTO DIFF WBC: CPT

## 2021-09-16 PROCEDURE — 80178 ASSAY OF LITHIUM: CPT

## 2021-09-16 PROCEDURE — 84443 ASSAY THYROID STIM HORMONE: CPT

## 2021-09-16 PROCEDURE — 80048 BASIC METABOLIC PNL TOTAL CA: CPT

## 2021-09-16 PROCEDURE — 84439 ASSAY OF FREE THYROXINE: CPT

## 2021-09-17 NOTE — RESULT ENCOUNTER NOTE
Lithium level is within range    TSH and elevated fasting glucose should be addressed by PCP or endocrinologist

## 2021-09-20 ENCOUNTER — TELEPHONE (OUTPATIENT)
Dept: PSYCHIATRY | Facility: CLINIC | Age: 76
End: 2021-09-20

## 2021-09-20 NOTE — TELEPHONE ENCOUNTER
Dr Paolo Leach:    Divya De Guzman called to inform us that she had "tremendous problems with the Lithium " She states she had tremendous muscle pain, really bad depression, and blurred vision  Therefore, she discontinued taking the Lithium on last Friday, and now whe feels much better as all symptoms have subsided greatly  She states that she spoke with pharmacy who told her that it may be an allergy  She also states that she had her labs done last Thursday  Case Management:    Divya De Guzman called requesting grief therapy  For your review

## 2021-09-22 NOTE — TELEPHONE ENCOUNTER
Courtney is a 53 year old female who presents to the Immediate Care for a possible allergic reaction to it a vaccination.  She states 2 days ago she received her second shingles vaccination in her left deltoid area.  She has had a little fatigue and body aches since.  Today she noticed some redness and swelling in the upper arm.  It is a little bit warm and tender.  There is no itching.  She denies any fevers or chills.  She denies any red streaks.  There is no difficulty breathing or swallowing.    PMH:   see chart   Alg:      ALLERGIES:  Patient has no known allergies.   Meds:  see chart   Tobacco Use: none     OBJECTIVE:  Visit Vitals  /80   Pulse 72   Temp 97.4 °F (36.3 °C) (Temporal)   Resp 16   SpO2 98%      Gen: alert & oriented, pleasant and comfortable  Lungs: Clear to auscultation; good air entry           No use of accessory muscles  CV:  Regular rate and rhythm; no S3/S4  L Arm:  Erythema, induration and warmth over the lower deltoid area                No axillary lymphadenopathy; no erythematous streaks     ASSESSMENT:/PLAN:  Vaccine reaction    I discussed this with the patient.  Recommended over the counter symptomatic treatments.  Reassurance given  The patient indicates understanding of these issues and agrees with the plan.  Patient is to follow-up if symptoms persist or worsen over the next few days.       Electronically signed by:  Keith E Weiler, MD on 3/13/2021   Left message for Elizabeth Arauz to call back Case management at  to discuss grief therapy resources

## 2021-09-24 NOTE — TELEPHONE ENCOUNTER
Spoke with Carol Quan and relayed information from Dr Richard Shanks  She was appreciative of follow up call  She said she "will stay off the Lithium" and discuss at next visit  She said Osbaldo Mays had called her regarding grief/talk therapy  She would like her to call back   [phone]

## 2021-09-24 NOTE — TELEPHONE ENCOUNTER
Spoke with Santa George to discuss interim therapy while Dr Toño Maravilla it out  Provided information for:  [facility]  2nd/4th Wednesday at [address] , [place]  [address]  In person and virtual  [phone]    Santa George discussed her hesitancy with virtual or at night, but is still interested and will possibly call   Also discussed adding patient to therapy waitlist

## 2021-09-28 ENCOUNTER — OFFICE VISIT (OUTPATIENT)
Dept: SURGICAL ONCOLOGY | Facility: CLINIC | Age: 76
End: 2021-09-28
Payer: COMMERCIAL

## 2021-09-28 VITALS
SYSTOLIC BLOOD PRESSURE: 140 MMHG | RESPIRATION RATE: 18 BRPM | HEIGHT: 65 IN | WEIGHT: 151 LBS | TEMPERATURE: 96.2 F | HEART RATE: 74 BPM | OXYGEN SATURATION: 99 % | BODY MASS INDEX: 25.16 KG/M2 | DIASTOLIC BLOOD PRESSURE: 82 MMHG

## 2021-09-28 DIAGNOSIS — C44.41 BASAL CELL CARCINOMA (BCC) OF SCALP: Primary | ICD-10-CM

## 2021-09-28 DIAGNOSIS — C44.319 BASAL CELL CARCINOMA (BCC) OF LEFT TEMPLE REGION: ICD-10-CM

## 2021-09-28 DIAGNOSIS — C44.219 BASAL CELL CARCINOMA (BCC) OF LEFT EAR: ICD-10-CM

## 2021-09-28 PROCEDURE — 99213 OFFICE O/P EST LOW 20 MIN: CPT | Performed by: NURSE PRACTITIONER

## 2021-09-28 NOTE — PROGRESS NOTES
Surgical Oncology Follow Up       [address] [place]  [facility] [address] [id number]    David Smith  1945  [id number]    Chief Complaint   Patient presents with    Follow-up        Assessment/Plan:  1  Basal cell carcinoma (BCC) of scalp  - 1 year f/u visit with Dr Robert Hyde    2  Basal cell carcinoma (BCC) of left temple region      3  Basal cell carcinoma (BCC) of left ear    Discussion/Summary:  Patient is a 42-year-old female that presents today for a follow-up visit for multiple basal cell carcinoma  She had 2 lesions on her scalp which were completely excised  The lesion at the temple and ear had focally positive margins however observation was recommended as she had skin graft reconstruction  She continues to follow closely with her dermatologist  She underwent a recent Mohs procedure of her posterior scalp region in the area of her previous skin graft site  We will request these records for our files  There are no worrisome findings on her exam today  We will plan to see her back in 1 year for another clinical exam or sooner should the need arise  She knows to contact us with any changes or concerns in the interim  All of her questions were answered today  History of Present Illness:     Oncology History   Basal cell carcinoma (BCC) of scalp   2/19/2019 Initial Diagnosis    Basal cell carcinoma (BCC) - biopsies of vertex, anterior scalp, left temple, and left ear     5/29/2019 Surgery    Excision of four sites of [facility] with reconstruction (Dr Nely Coughlin)  - left temple margins involved at multiple points  - anterior scalp margins are clear  - vertex margins are close but clear  - left ear margins are involved at multiple points              -Interval History:  Patient presents today for a follow-up visit for multiple basal cell carcinomas    She has undergone a recent Mohs procedure of her posterior scalp at the area of her previous basal cell carcinoma  She continues to follow closely with her dermatologist     Dermatologist: Dr Colunga/ Dr Brandon Gilliam of Systems:  Review of Systems   Constitutional: Negative for activity change, appetite change, chills, fatigue, fever and unexpected weight change  Respiratory: Negative for cough and shortness of breath  Cardiovascular: Negative for chest pain  Gastrointestinal: Negative for abdominal pain, constipation, diarrhea, nausea and vomiting  Musculoskeletal: Negative for arthralgias, back pain, gait problem and myalgias  Skin: Negative for color change and rash  Neurological: Negative for dizziness and headaches  Hematological: Negative for adenopathy  Psychiatric/Behavioral: Negative for agitation and confusion  All other systems reviewed and are negative        Patient Active Problem List   Diagnosis    Type 2 diabetes mellitus without complication, without long-term current use of insulin ([facility] )    Pure hypercholesterolemia    Fatty infiltration of liver    Mixed obsessional thoughts and acts    Osteoporosis    Subclinical hypothyroidism    Basal cell carcinoma (BCC) of scalp    Recurrent major depressive disorder, in partial remission ([facility] )    Basal cell carcinoma of skin of face    Basal cell carcinoma (BCC) of left temple region    Basal cell carcinoma (BCC) of left ear    Bipolar 2 disorder (HCC)    Elevated blood pressure reading without diagnosis of hypertension     Past Medical History:   Diagnosis Date    Alopecia areata     Anxiety     Asthma     Basal cell carcinoma (BCC) in situ of skin     Depression     Diabetes mellitus (HCC)     Fibromyalgia, primary     H/O migraine     improved since menopause    Hashimoto's disease     Hyperlipidemia     Incontinence of urine     PONV (postoperative nausea and vomiting)      Past Surgical History:   Procedure Laterality Date    FLAP LOCAL HEAD / NECK N/A 5/29/2019    Procedure: RECONSTRUCTION BASAL CELL CARCINOMA DEFECTS OF LEFT TEMPLE, VERTEX, ANTERIOR SCALP AND LEFT EAR;  Surgeon: Cameron Mohs, MD;  Location: [facility];  Service: Plastics    FULL THICKNESS SKIN GRAFT Left 9/15/2020    Procedure: EAR FULL THICKNESS SKIN GRAFT (FTSG); Surgeon: Cameron Mohs, MD;  [facility];  Service: Plastics    LITHOTRIPSY      Renal    MOHS RECONSTRUCTION Left 9/15/2020    Procedure: EAR MOHS DEFECT RECONSTRUCTION;  Surgeon: Cameron Mohs, MD;  [facility];  Service: Plastics    SC FULL THICK  [facility] HEAD,FAC,HAND <20SQC N/A 2019    Procedure: FTSG LEFT EAR;  Surgeon: Cameron Mohs, MD;  [facility];  Service: Plastics    RECTAL VAGINAL FISTULECTOMY      SKIN BIOPSY      SKIN LESION EXCISION N/A 2019    Procedure: WIDE EXCISION BASAL CELL CARCINOMA VERTEX OF SCALP, LEFT EAR AND LEFT TEMPLE;  Surgeon: Maida Stoddard MD;  [facility];  Service: Surgical Oncology    SPLIT THICKNESS SKIN GRAFT N/A 2019    Procedure: FLAP RECONSTRUCTION OF LEFT TEMPLE, STSG TO SCALP X2;  Surgeon: Cameron Mohs, MD;  [facility];  Service: Plastics    TONSILLECTOMY      with adenoidectomy     Family History   Adopted: Yes   Problem Relation Age of Onset   [place] ALS Mother     Other Mother         Gastrointestinal bleeding    Other Father         Gastrointestinal bleeding    Alcohol abuse Father     Colon cancer Maternal Aunt     Psychosis Cousin      Social History     Socioeconomic History    Marital status:      Spouse name: Not on file    Number of children: 3    Years of education: 12    Highest education level:  Bachelor's degree (e g , BA, AB, BS)   Occupational History    Occupation: retired   Tobacco Use    Smoking status: Former Smoker     Packs/day: 0 25     Years: 2 00     Pack years: 0 50     Types: Cigarettes     Quit date:      Years since quittin 7    Smokeless tobacco: Never Used   Vaping Use  Vaping Use: Never used   Substance and Sexual Activity    Alcohol use: Not Currently    Drug use: No    Sexual activity: Not Currently   Other Topics Concern    Not on file   Social History Narrative    Not on file     Social Determinants of Health     Financial Resource Strain:     Difficulty of Paying Living Expenses:    Food Insecurity:     Worried About Running Out of Food in the Last Year:     [address] in the Last Year:    Transportation Needs:     Lack of Transportation (Medical):      Lack of Transportation (Non-Medical):    Physical Activity:     Days of Exercise per Week:     Minutes of Exercise per Session:    Stress:     Feeling of Stress :    Social Connections:     Frequency of Communication with Friends and Family:     Frequency of Social Gatherings with Friends and Family:     Attends Restoration[employer] Services:     Active Member of Clubs or Organizations:     Attends Club or Organization Meetings:     Marital Status:    Intimate Partner Violence:     Fear of Current or Ex-Partner:     Emotionally Abused:     Physically Abused:     Sexually Abused:        Current Outpatient Medications:     acetaminophen (TYLENOL) 500 mg tablet, Take 500 mg by mouth every 8 (eight) hours as needed for mild pain (Patient not taking: Reported on 9/28/2021), Disp: , Rfl:     lithium carbonate (LITHOBID) 300 mg CR tablet, Take 1 tablet (300 mg total) by mouth daily (Patient not taking: Reported on 8/23/2021), Disp: 30 tablet, Rfl: 2    Multiple Vitamins-Minerals (MULTIVITAMIN ADULTS 50+ PO), Take by mouth daily (Patient not taking: Reported on 9/28/2021), Disp: , Rfl:     mupirocin (BACTROBAN) 2 % ointment, APPLY DAILY WITH BANDAGE CHANGE (Patient not taking: Reported on 9/28/2021), Disp: , Rfl:     traZODone (DESYREL) 50 mg tablet, Take 0 5 tablets (25 mg total) by mouth daily at bedtime Can take 50mg if needed for insomnia (Patient not taking: Reported on 8/23/2021), Disp: 30 tablet, Rfl: 5    zolpidem (AMBIEN) 5 mg tablet, Take 1 tablet (5 mg total) by mouth daily at bedtime as needed for sleep (Patient not taking: Reported on 8/23/2021), Disp: 15 tablet, Rfl: 0  Allergies   Allergen Reactions    Amoxicillin Diarrhea    Bee Venom     Clindamycin GI Intolerance    Flu Virus Vaccine     Lisinopril     Sulfites - Food Allergy Throat Swelling     Vitals:    09/28/21 1252   BP: 140/82   Pulse: 74   Resp: 18   Temp: (!) 96 2 °F (35 7 °C)   SpO2: 99%       Physical Exam  Vitals reviewed  Constitutional:       General: She is not in acute distress  Appearance: Normal appearance  She is well-developed  She is not diaphoretic  HENT:      Head: Normocephalic and atraumatic  Cardiovascular:      Rate and Rhythm: Normal rate and regular rhythm  Heart sounds: Normal heart sounds  Pulmonary:      Effort: Pulmonary effort is normal       Breath sounds: Normal breath sounds  Abdominal:      Palpations: Abdomen is soft  There is no mass  Tenderness: There is no abdominal tenderness  Musculoskeletal:         General: Normal range of motion  Cervical back: Normal range of motion  Lymphadenopathy:      Head:      Right side of head: No preauricular, posterior auricular or occipital adenopathy  Left side of head: No preauricular, posterior auricular or occipital adenopathy  Cervical: No cervical adenopathy  Upper Body:      Right upper body: No supraclavicular or axillary adenopathy  Left upper body: No supraclavicular or axillary adenopathy  Skin:     General: Skin is warm and dry  Findings: No rash  Comments: Scalp incisions/graft site well healed without skin changes or nodules  Healing scar at the more posterior graft site after recent excision  Left temple and ear sites look good without suspcious skin changes or lesions  Neurological:      Mental Status: She is alert and oriented to person, place, and time     Psychiatric:         Speech: Speech normal          Advance Care Planning/Advance Directives:  Discussed disease status, cancer treatment plans and/or cancer treatment goals with the patient

## 2021-12-13 ENCOUNTER — TELEPHONE (OUTPATIENT)
Dept: FAMILY MEDICINE CLINIC | Facility: CLINIC | Age: 76
End: 2021-12-13

## 2022-01-21 ENCOUNTER — RA CDI HCC (OUTPATIENT)
Dept: OTHER | Facility: HOSPITAL | Age: 77
End: 2022-01-21

## 2022-01-21 NOTE — PROGRESS NOTES
[facility]  coding opportunities       Chart reviewed, no opportunity found: CHART REVIEWED, NO OPPORTUNITY FOUND                        Patients insurance company: [employer] only)

## 2022-04-06 ENCOUNTER — TELEPHONE (OUTPATIENT)
Dept: PSYCHIATRY | Facility: CLINIC | Age: 77
End: 2022-04-06

## 2022-06-03 ENCOUNTER — TELEPHONE (OUTPATIENT)
Dept: PSYCHIATRY | Facility: CLINIC | Age: 77
End: 2022-06-03

## 2022-06-20 ENCOUNTER — EPISODE CHANGES (OUTPATIENT)
Dept: CASE MANAGEMENT | Facility: OTHER | Age: 77
End: 2022-06-20

## 2022-06-21 ENCOUNTER — PATIENT OUTREACH (OUTPATIENT)
Dept: FAMILY MEDICINE CLINIC | Facility: CLINIC | Age: 77
End: 2022-06-21

## 2022-06-21 NOTE — PROGRESS NOTES
Chart review completed for the following sections:   Recent Vital Signs   Allergies/Contradictions   Medication Review    History    [facility]    Problem List   Immunizations   Past hospitalizations and major procedures, including surgery   Significant past illnesses and treatment history including: Other provider notes   Relevant past medications related to the patient's condition

## 2022-06-24 ENCOUNTER — PATIENT OUTREACH (OUTPATIENT)
Dept: FAMILY MEDICINE CLINIC | Facility: CLINIC | Age: 77
End: 2022-06-24

## 2022-06-24 NOTE — LETTER
[address] [facility]  [place] [address]  [phone]    Re: Ibrahima Lazaro to Reach   6/24/2022       Dear Odalis Banegas am a [address]  and wanted to be certain you had information to contact me should you desire assistance with or have questions about non-medical aspects of your care such as [but not limited to] medical insurance, housing, transportation, material needs, or emergency needs, as I was unable to reach you  If I do not have an answer I will assist you in finding the appropriate agency or individual who can help  Please feel free to contact me at [phone]  Thank You      Sincerely,     Ham Crespo MSW

## 2022-06-24 NOTE — PROGRESS NOTES
Chart review completed for the following sections:   History    SDOH    Problem List   Past hospitalizations and major procedures, including surgery   Significant past illnesses and treatment history including: Other provider notes   Relevant past medications related to the patient's condition    Patient eligible for complex care management program   2nd outreach attempt to patient to introduce [facility] team and Cami LOBATO  No answer, voicemail left, and awaiting return call  Will mail unable to reach letter and follow-up in 2 weeks

## 2022-07-08 ENCOUNTER — PATIENT OUTREACH (OUTPATIENT)
Dept: FAMILY MEDICINE CLINIC | Facility: CLINIC | Age: 77
End: 2022-07-08

## 2022-07-08 NOTE — PROGRESS NOTES
No return call received from patient since recent outreach attempts and sending Unable to Reach letter  Will close case at this time  Routed to Sandi

## 2022-10-04 ENCOUNTER — OFFICE VISIT (OUTPATIENT)
Dept: SURGICAL ONCOLOGY | Facility: CLINIC | Age: 77
End: 2022-10-04
Payer: COMMERCIAL

## 2022-10-04 VITALS
WEIGHT: 151 LBS | HEART RATE: 83 BPM | RESPIRATION RATE: 16 BRPM | OXYGEN SATURATION: 99 % | SYSTOLIC BLOOD PRESSURE: 148 MMHG | HEIGHT: 65 IN | BODY MASS INDEX: 25.16 KG/M2 | DIASTOLIC BLOOD PRESSURE: 84 MMHG | TEMPERATURE: 97.7 F

## 2022-10-04 DIAGNOSIS — C44.319 BASAL CELL CARCINOMA (BCC) OF LEFT TEMPLE REGION: ICD-10-CM

## 2022-10-04 DIAGNOSIS — C44.219 BASAL CELL CARCINOMA (BCC) OF LEFT EAR: Primary | ICD-10-CM

## 2022-10-04 DIAGNOSIS — C44.41 BASAL CELL CARCINOMA (BCC) OF SCALP: ICD-10-CM

## 2022-10-04 PROCEDURE — 99213 OFFICE O/P EST LOW 20 MIN: CPT | Performed by: SURGERY

## 2022-10-04 NOTE — LETTER
October 4, 2022     Jose Enrique Messina MD  [address] [place] [id number]    Patient: Katerin Rivera   YOB: 1945   Date of Visit: 10/4/2022       Dear Dr Schulte Homes: Thank you for referring Veronica Obregon to me for evaluation  Below are my notes for this consultation  If you have questions, please do not hesitate to call me  I look forward to following your patient along with you  Sincerely,        Seun Small MD        CC: MD Seun Cherry MD  10/4/2022  9:00 AM  Incomplete               Surgical Oncology Follow Up       [address] [facility]  [facility]  [address] [facility] [address]    Katerin Rivera  1945  [id number]  [address]  [facility]  [address] [address]    Diagnoses and all orders for this visit:    Basal cell carcinoma (BCC) of left ear    Basal cell carcinoma (BCC) of left temple region    Basal cell carcinoma (BCC) of scalp        Chief Complaint   Patient presents with    Follow-up       Return in about 1 year (around 10/4/2023) for Office Visit, with [facility]        Oncology History   Basal cell carcinoma (BCC) of scalp   2/19/2019 Initial Diagnosis    Basal cell carcinoma (BCC) - biopsies of vertex, anterior scalp, left temple, and left ear     5/29/2019 Surgery    Excision of four sites of [facility] with reconstruction (Dr Ele Watkins)  - left temple margins involved at multiple points  - anterior scalp margins are clear  - vertex margins are close but clear  - left ear margins are involved at multiple points             Staging:  Basal cell carcinoma at 4 sites  The left ear and left temple had focally positive margins after skin grafting  Treatment history:  Wide excision basal cell carcinoma x4 with skin graft  Current treatment:  Observation  Disease status: CONOR    History of Present Illness:  Patient returns in follow-up of her basal cell carcinomas  She did have some positive margins after skin grafting  She continues to follow up with her dermatologist every 6 months  She has these lesions biopsied and then either Mohs or frozen with nitrogen  Review of Systems  Complete ROS Surg Onc:   Complete ROS Surg Onc:   Constitutional: The patient denies new or recent history of general fatigue, no recent weight loss, no change in appetite  Eyes: No complaints of visual problems, no scleral icterus  ENT: no complaints of ear pain, no hoarseness, no difficulty swallowing,  no tinnitus and no new masses in head, oral cavity, or neck  Cardiovascular: No complaints of chest pain, no palpitations, no ankle edema  Respiratory: No complaints of shortness of breath, no cough  Gastrointestinal: No complaints of jaundice, no bloody stools, no pale stools  Genitourinary: No complaints of dysuria, no hematuria, no nocturia, no frequent urination, no urethral discharge  Musculoskeletal: No complaints of weakness, paralysis, joint stiffness or arthralgias  Integumentary: No complaints of rash, no new lesions  Neurological: No complaints of convulsions, no seizures, no dizziness  Hematologic/Lymphatic: No complaints of easy bruising  Endocrine:  No hot or cold intolerance  No polydipsia, polyphagia, or polyuria  Allergy/immunology:  No environmental allergies  No food allergies  Not immunocompromised  Skin:  No pallor or rash  No wound          Patient Active Problem List   Diagnosis    Type 2 diabetes mellitus without complication, without long-term current use of insulin ([address] )    Pure hypercholesterolemia    Fatty infiltration of liver    Mixed obsessional thoughts and acts    Osteoporosis    Subclinical hypothyroidism    Basal cell carcinoma (BCC) of scalp    Recurrent major depressive disorder, in partial remission ([address] )    Basal cell carcinoma of skin of face    Basal cell carcinoma (BCC) of left temple region    Basal cell carcinoma (BCC) of left ear    Bipolar 2 disorder ([address] )    Elevated blood pressure reading without diagnosis of hypertension     Past Medical History:   Diagnosis Date    Alopecia areata     Anxiety     Asthma     Basal cell carcinoma (BCC) in situ of skin     Depression     Diabetes mellitus (HCC)     Fibromyalgia, primary     H/O migraine     improved since menopause    Hashimoto's disease     Hyperlipidemia     Incontinence of urine     PONV (postoperative nausea and vomiting)      Past Surgical History:   Procedure Laterality Date    FLAP LOCAL HEAD / NECK N/A 5/29/2019    Procedure: RECONSTRUCTION BASAL CELL CARCINOMA DEFECTS OF LEFT TEMPLE, VERTEX, ANTERIOR SCALP AND LEFT EAR;  Surgeon: Anastacio Lerner MD;  [facility];  Service: Plastics    FULL THICKNESS SKIN GRAFT Left 9/15/2020    Procedure: EAR FULL THICKNESS SKIN GRAFT (FTSG);   Surgeon: Anastacio Lerner MD;  [facility];  Service: Plastics    LITHOTRIPSY      Renal    MOHS RECONSTRUCTION Left 9/15/2020    Procedure: EAR MOHS DEFECT RECONSTRUCTION;  Surgeon: Anastacio Lerner MD;  [facility];  Service: Plastics    MA FULL THICK 2011 [facility] HEAD,FAC,HAND <20SQC N/A 5/29/2019    Procedure: FTSG LEFT EAR;  Surgeon: Anastacio Lerner MD;  [facility];  Service: Plastics    RECTAL VAGINAL FISTULECTOMY      SKIN BIOPSY      SKIN LESION EXCISION N/A 5/29/2019    Procedure: WIDE EXCISION BASAL CELL CARCINOMA VERTEX OF SCALP, LEFT EAR AND LEFT TEMPLE;  Surgeon: Ronald Recinos MD;  [facility];  Service: Surgical Oncology    SPLIT THICKNESS SKIN GRAFT N/A 5/29/2019    Procedure: FLAP RECONSTRUCTION OF LEFT TEMPLE, STSG TO SCALP X2;  Surgeon: Anastacio Lerner MD;  [facility];  Service: Plastics    TONSILLECTOMY      with adenoidectomy     Family History   Adopted: Yes   Problem Relation Age of Onset    ALS Mother    Jose Brand Other Mother Gastrointestinal bleeding    Other Father         Gastrointestinal bleeding    Alcohol abuse Father     Colon cancer Maternal Aunt     Psychosis Cousin      Social History     Socioeconomic History    Marital status:      Spouse name: Not on file    Number of children: 3    Years of education: 12    Highest education level: Bachelor's degree (e g , BA, AB, BS)   Occupational History    Occupation: retired   Tobacco Use    Smoking status: Former Smoker     Packs/day: 0 25     Years: 2 00     Pack years: 0 50     Types: Cigarettes     Quit date:      Years since quittin 7    Smokeless tobacco: Never Used   Vaping Use    Vaping Use: Never used   Substance and Sexual Activity    Alcohol use: Not Currently    Drug use: No    Sexual activity: Not Currently   Other Topics Concern    Not on file   Social History Narrative    Not on file     Social Determinants of Health     Financial Resource Strain: Not on file   Food Insecurity: Not on file   Transportation Needs: Not on file   Physical Activity: Not on file   Stress: Not on file   Social Connections: Not on file   Intimate Partner Violence: Not on file   Housing Stability: Not on file       Current Outpatient Medications:     acetaminophen (TYLENOL) 500 mg tablet, Take 500 mg by mouth every 8 (eight) hours as needed for mild pain (Patient not taking: Reported on 2021), Disp: , Rfl:     Multiple Vitamins-Minerals (MULTIVITAMIN ADULTS 50+ PO), Take by mouth daily (Patient not taking: Reported on 2021), Disp: , Rfl:   Allergies   Allergen Reactions    Amoxicillin Diarrhea    Bee Venom     Clindamycin GI Intolerance    Influenza Virus Vaccine     Lisinopril     Sulfites - Food Allergy Throat Swelling     Vitals:    10/04/22 0843   BP: 148/84   Pulse: 83   Resp: 16   Temp: 97 7 °F (36 5 °C)   SpO2: 99%       Physical Exam  Constitutional: General appearance:  The Patient is well-developed and well-nourished who appears the stated age in no acute distress  Patient is pleasant and talkative  HEENT:  Normocephalic  Sclerae are anicteric  Mucous membranes are moist  Neck is supple without adenopathy  No JVD  Chest: The lungs are clear to auscultation  Cardiac: Heart is regular rate  Abdomen: Abdomen is soft, non-tender, non-distended and without masses  Extremities: There is no clubbing or cyanosis  There is no edema  Symmetric  Neuro: Grossly nonfocal  Gait is normal      Lymphatic: No evidence of cervical adenopathy bilaterally  There is a palpable left axillary lymph node  According to the patient this is stable  Skin: Warm, anicteric  Wide excisions on the scalp and ear have healed well  No evidence of local recurrence  There are some healing biopsy sites at the graft site at the vertex of her scalp  Psych:  Patient is pleasant and talkative  Breasts:        Pathology:  [unfilled]    Labs:      Imaging  No results found  I reviewed the above laboratory and imaging data  Discussion/Summary:  43-year-old female with multiple basal cell carcinomas  The 2 lesions on the scalp and then completely excised  The lesion at the temple and the left ear had focally positive margins  These were reconstructed with Plastic surgery with skin grafts  She continues to have these areas followed dermatology and then either subsequent nitrogen therapy or Mohs surgery  From my standpoint she is doing very well  Biopsy sites on her scalp do not appear suspicious  The left axillary lymph node is stable according to the patient  She will continue to follow up with Dermatology  I will see her again in 1 year if she does not move to [place]  She is agreeable with this  All her questions were answered

## 2022-10-04 NOTE — PROGRESS NOTES
Surgical Oncology Follow Up       [address] [facility]  [address]  [place] [address]    Saba Glance  1945  [id number]  [address] [facility]  [address] [address]    Diagnoses and all orders for this visit:    Basal cell carcinoma (BCC) of left ear    Basal cell carcinoma (BCC) of left temple region    Basal cell carcinoma (BCC) of scalp        Chief Complaint   Patient presents with    Follow-up       Return in about 1 year (around 10/4/2023) for Office Visit, with Mirta  Oncology History   Basal cell carcinoma (BCC) of scalp   2/19/2019 Initial Diagnosis    Basal cell carcinoma (BCC) - biopsies of vertex, anterior scalp, left temple, and left ear     5/29/2019 Surgery    Excision of four sites of [facility] with reconstruction (Dr Yoandy Paz)  - left temple margins involved at multiple points  - anterior scalp margins are clear  - vertex margins are close but clear  - left ear margins are involved at multiple points             Staging:  Basal cell carcinoma at 4 sites  The left ear and left temple had focally positive margins after skin grafting  Treatment history:  Wide excision basal cell carcinoma x4 with skin graft  Current treatment:  Observation  Disease status: CONOR    History of Present Illness:  Patient returns in follow-up of her basal cell carcinomas  She did have some positive margins after skin grafting  She continues to follow up with her dermatologist every 6 months  She has these lesions biopsied and then either Mohs or frozen with nitrogen  Review of Systems  Complete ROS Surg Onc:   Complete ROS Surg Onc:   Constitutional: The patient denies new or recent history of general fatigue, no recent weight loss, no change in appetite  Eyes: No complaints of visual problems, no scleral icterus     ENT: no complaints of ear pain, no hoarseness, no difficulty swallowing,  no tinnitus and no new masses in head, oral cavity, or neck  Cardiovascular: No complaints of chest pain, no palpitations, no ankle edema  Respiratory: No complaints of shortness of breath, no cough  Gastrointestinal: No complaints of jaundice, no bloody stools, no pale stools  Genitourinary: No complaints of dysuria, no hematuria, no nocturia, no frequent urination, no urethral discharge  Musculoskeletal: No complaints of weakness, paralysis, joint stiffness or arthralgias  Integumentary: No complaints of rash, no new lesions  Neurological: No complaints of convulsions, no seizures, no dizziness  Hematologic/Lymphatic: No complaints of easy bruising  Endocrine:  No hot or cold intolerance  No polydipsia, polyphagia, or polyuria  Allergy/immunology:  No environmental allergies  No food allergies  Not immunocompromised  Skin:  No pallor or rash  No wound          Patient Active Problem List   Diagnosis    Type 2 diabetes mellitus without complication, without long-term current use of insulin ([facility] )    Pure hypercholesterolemia    Fatty infiltration of liver    Mixed obsessional thoughts and acts    Osteoporosis    Subclinical hypothyroidism    Basal cell carcinoma (BCC) of scalp    Recurrent major depressive disorder, in partial remission ([address] )    Basal cell carcinoma of skin of face    Basal cell carcinoma (BCC) of left temple region    Basal cell carcinoma (BCC) of left ear    Bipolar 2 disorder (HCC)    Elevated blood pressure reading without diagnosis of hypertension     Past Medical History:   Diagnosis Date    Alopecia areata     Anxiety     Asthma     Basal cell carcinoma (BCC) in situ of skin     Depression     Diabetes mellitus (HCC)     Fibromyalgia, primary     H/O migraine     improved since menopause    Hashimoto's disease     Hyperlipidemia     Incontinence of urine     PONV (postoperative nausea and vomiting)      Past Surgical History:   Procedure Laterality Date    FLAP LOCAL HEAD / NECK N/A 5/29/2019    Procedure: RECONSTRUCTION BASAL CELL CARCINOMA DEFECTS OF LEFT TEMPLE, VERTEX, ANTERIOR SCALP AND LEFT EAR;  Surgeon: Tl Kramer MD;  [facility];  Service: Plastics    FULL THICKNESS SKIN GRAFT Left 9/15/2020    Procedure: EAR FULL THICKNESS SKIN GRAFT (FTSG); Surgeon: Tl Kramer MD;  [facility];  Service: Plastics    LITHOTRIPSY      Renal    MOHS RECONSTRUCTION Left 9/15/2020    Procedure: EAR MOHS DEFECT RECONSTRUCTION;  Surgeon: Tl Kramer MD;  [facility];  Service: Plastics    [place] FULL THICK 2011 [facility] HEAD,FAC,HAND <20SQC N/A 5/29/2019    Procedure: FTSG LEFT EAR;  Surgeon: Tl Kramer MD;  [facility];  Service: Plastics    RECTAL VAGINAL FISTULECTOMY      SKIN BIOPSY      SKIN LESION EXCISION N/A 5/29/2019    Procedure: WIDE EXCISION BASAL CELL CARCINOMA VERTEX OF SCALP, LEFT EAR AND LEFT TEMPLE;  Surgeon: Cora Schmid MD;  [facility];  Service: Surgical Oncology    SPLIT THICKNESS SKIN GRAFT N/A 5/29/2019    Procedure: FLAP RECONSTRUCTION OF LEFT TEMPLE, STSG TO SCALP X2;  Surgeon: Tl Kramer MD;  [facility];  Service: Plastics    TONSILLECTOMY      with adenoidectomy     Family History   Adopted: Yes   Problem Relation Age of Onset   [facility] ALS Mother     Other Mother         Gastrointestinal bleeding    Other Father         Gastrointestinal bleeding    Alcohol abuse Father     Colon cancer Maternal Aunt     Psychosis Cousin      Social History     Socioeconomic History    Marital status:      Spouse name: Not on file    Number of children: 3    Years of education: 12    Highest education level:  Bachelor's degree (e g , BA, AB, BS)   Occupational History    Occupation: retired   Tobacco Use    Smoking status: Former Smoker     Packs/day: 0 25     Years: 2 00     Pack years: 0 50     Types: Cigarettes     Quit date: 26     Years since quittin 7    Smokeless tobacco: Never Used   Vaping Use    Vaping Use: Never used   Substance and Sexual Activity    Alcohol use: Not Currently    Drug use: No    Sexual activity: Not Currently   Other Topics Concern    Not on file   Social History Narrative    Not on file     Social Determinants of Health     Financial Resource Strain: Not on file   Food Insecurity: Not on file   Transportation Needs: Not on file   Physical Activity: Not on file   Stress: Not on file   Social Connections: Not on file   Intimate Partner Violence: Not on file   Housing Stability: Not on file       Current Outpatient Medications:     acetaminophen (TYLENOL) 500 mg tablet, Take 500 mg by mouth every 8 (eight) hours as needed for mild pain (Patient not taking: Reported on 2021), Disp: , Rfl:     Multiple Vitamins-Minerals (MULTIVITAMIN ADULTS 50+ PO), Take by mouth daily (Patient not taking: Reported on 2021), Disp: , Rfl:   Allergies   Allergen Reactions    Amoxicillin Diarrhea    Bee Venom     Clindamycin GI Intolerance    Influenza Virus Vaccine     Lisinopril     Sulfites - Food Allergy Throat Swelling     Vitals:    10/04/22 0843   BP: 148/84   Pulse: 83   Resp: 16   Temp: 97 7 °F (36 5 °C)   SpO2: 99%       Physical Exam  Constitutional: General appearance: The Patient is well-developed and well-nourished who appears the stated age in no acute distress  Patient is pleasant and talkative  HEENT:  Normocephalic  Sclerae are anicteric  Mucous membranes are moist  Neck is supple without adenopathy  No JVD  Chest: The lungs are clear to auscultation  Cardiac: Heart is regular rate  Abdomen: Abdomen is soft, non-tender, non-distended and without masses  Extremities: There is no clubbing or cyanosis  There is no edema  Symmetric  Neuro: Grossly nonfocal  Gait is normal      Lymphatic: No evidence of cervical adenopathy bilaterally     There is a palpable left axillary lymph node  According to the patient this is stable  Skin: Warm, anicteric  Wide excisions on the scalp and ear have healed well  No evidence of local recurrence  There are some healing biopsy sites at the graft site at the vertex of her scalp  Psych:  Patient is pleasant and talkative  Breasts:        Pathology:  [unfilled]    Labs:      Imaging  No results found  I reviewed the above laboratory and imaging data  Discussion/Summary:  75-year-old female with multiple basal cell carcinomas  The 2 lesions on the scalp and then completely excised  The lesion at the temple and the left ear had focally positive margins  These were reconstructed with Plastic surgery with skin grafts  She continues to have these areas followed dermatology and then either subsequent nitrogen therapy or Mohs surgery  From my standpoint she is doing very well  Biopsy sites on her scalp do not appear suspicious  The left axillary lymph node is stable according to the patient  She will continue to follow up with Dermatology  I will see her again in 1 year if she does not move to [place]  She is agreeable with this  All her questions were answered

## 2023-09-27 NOTE — ASSESSMENT & PLAN NOTE
continue with ketoconazole  May improve with thyroid medication  Check vitamin d leve  General Sunscreen Counseling: I recommended a broad spectrum sunscreen with a SPF of 30 or higher.  I explained that SPF 30 sunscreens block approximately 97 percent of the sun's harmful rays.  Sunscreens should be applied at least 15 minutes prior to expected sun exposure and then every 2 hours after that as long as sun exposure continues. If swimming or exercising sunscreen should be reapplied every 45 minutes to an hour after getting wet or sweating.  One ounce, or the equivalent of a shot glass full of sunscreen, is adequate to protect the skin not covered by a bathing suit. I also recommended a lip balm with a sunscreen as well. Sun protective clothing can be used in lieu of sunscreen but must be worn the entire time you are exposed to the sun's rays. Detail Level: Detailed (4) no impairment

## (undated) DEVICE — PADDING CAST 4 IN  COTTON STRL

## (undated) DEVICE — ELECTRODE NEEDLE MEGAFINE 2IN E-Z CLEAN MEGADYNE -0118

## (undated) DEVICE — PENCIL ELECTROSURG E-Z CLEAN -0035H

## (undated) DEVICE — CURITY NON-ADHERENT STRIPS: Brand: CURITY

## (undated) DEVICE — NEEDLE 25G X 1 1/2

## (undated) DEVICE — BULB SYRINGE,IRRIGATION WITH PROTECTIVE CAP: Brand: DOVER

## (undated) DEVICE — KERLIX BANDAGE ROLL: Brand: KERLIX

## (undated) DEVICE — DRESSING SILON DUAL-DRESS 50 FOAM 5.5 X 6 IN

## (undated) DEVICE — 10FR FRAZIER SUCTION HANDLE: Brand: CARDINAL HEALTH

## (undated) DEVICE — 3000CC GUARDIAN II: Brand: GUARDIAN

## (undated) DEVICE — TIBURON SPLIT SHEET: Brand: CONVERTORS

## (undated) DEVICE — PACK UNIVERSAL NECK

## (undated) DEVICE — GLOVE SRG BIOGEL ECLIPSE 8

## (undated) DEVICE — PROXIMATE SKIN STAPLERS (35 WIDE) CONTAINS 35 STAINLESS STEEL STAPLES (FIXED HEAD): Brand: PROXIMATE

## (undated) DEVICE — 3M™ TEGADERM™ TRANSPARENT FILM DRESSING FRAME STYLE, 1626W, 4 IN X 4-3/4 IN (10 CM X 12 CM), 50/CT 4CT/CASE: Brand: 3M™ TEGADERM™

## (undated) DEVICE — GLOVE SRG BIOGEL 7

## (undated) DEVICE — SYRINGE 10ML LL

## (undated) DEVICE — PLUMEPEN PRO 10FT

## (undated) DEVICE — RAZOR DBLE EDGE SHAVER

## (undated) DEVICE — 3M™ STERI-STRIP™ COMPOUND BENZOIN TINCTURE 40 BAGS/CARTON 4 CARTONS/CASE C1544: Brand: 3M™ STERI-STRIP™

## (undated) DEVICE — SILVER-COATED ANTIMICROBIAL BARRIER DRESSING: Brand: ACTICOAT   4" X 8"

## (undated) DEVICE — CHLORAPREP HI-LITE 26ML ORANGE

## (undated) DEVICE — SUT MONOCRYL 4-0 PS-2 27 IN Y426H

## (undated) DEVICE — ACE WRAP 6 IN STERILE

## (undated) DEVICE — IV CATH INTROCAN 18G X 1 1/4 SAFETY

## (undated) DEVICE — PAD GROUNDING ADULT

## (undated) DEVICE — SUT VICRYL 3-0 SH 27 IN J416H

## (undated) DEVICE — STOCKINETTE REGULAR

## (undated) DEVICE — NEEDLE 27 G X 1 1/4

## (undated) DEVICE — DRESSING XEROFORM 5 X 9

## (undated) DEVICE — GLOVE INDICATOR PI UNDERGLOVE SZ 8 BLUE

## (undated) DEVICE — ELECTRODE BLADE MOD E-Z CLEAN 2.5IN 6.4CM -0012M

## (undated) DEVICE — 3M™ STERI-STRIP™ REINFORCED ADHESIVE SKIN CLOSURES, R1547, 1/2 IN X 4 IN (12 MM X 100 MM), 6 STRIPS/ENVELOPE: Brand: 3M™ STERI-STRIP™

## (undated) DEVICE — SPONGE STICK WITH PVP-I: Brand: KENDALL

## (undated) DEVICE — DRESSING MAXORB EXTRA AG 4 IN X 4.75 IN

## (undated) DEVICE — INTENDED FOR TISSUE SEPARATION, AND OTHER PROCEDURES THAT REQUIRE A SHARP SURGICAL BLADE TO PUNCTURE OR CUT.: Brand: BARD-PARKER ® CARBON RIB-BACK BLADES

## (undated) DEVICE — LIGHT HANDLE COVER SLEEVE DISP BLUE STELLAR

## (undated) DEVICE — INTENDED FOR TISSUE SEPARATION, AND OTHER PROCEDURES THAT REQUIRE A SHARP SURGICAL BLADE TO PUNCTURE OR CUT.: Brand: BARD-PARKER SAFETY BLADES SIZE 15, STERILE

## (undated) DEVICE — DISPOSABLE EQUIPMENT COVER: Brand: SMALL TOWEL DRAPE

## (undated) DEVICE — TUBING SUCTION 5MM X 12 FT

## (undated) DEVICE — SYRINGE BULB 2 OZ

## (undated) DEVICE — DRESSING ACTICOAT AG 4 X 5 IN

## (undated) DEVICE — ACE WRAP 4 IN STERILE

## (undated) DEVICE — SKIN MARKER DUAL TIP WITH RULER CAP, FLEXIBLE RULER AND LABELS: Brand: DEVON

## (undated) DEVICE — POV-IOD SWAB STICKS

## (undated) DEVICE — DERMATOME BLADES: Brand: DERMATOME

## (undated) DEVICE — ASTOUND STANDARD SURGICAL GOWN, XL: Brand: CONVERTORS

## (undated) DEVICE — BETHLEHEM UNIVERSAL OUTPATIENT: Brand: CARDINAL HEALTH

## (undated) DEVICE — DRAPE SURGIKIT SADDLE BAG

## (undated) DEVICE — VIAL DECANTER